# Patient Record
Sex: FEMALE | Race: WHITE | Employment: FULL TIME
[De-identification: names, ages, dates, MRNs, and addresses within clinical notes are randomized per-mention and may not be internally consistent; named-entity substitution may affect disease eponyms.]

---

## 2017-01-16 ENCOUNTER — TELEPHONE (OUTPATIENT)
Dept: FAMILY MEDICINE CLINIC | Facility: CLINIC | Age: 37
End: 2017-01-16

## 2017-01-16 RX ORDER — VENLAFAXINE HYDROCHLORIDE 75 MG/1
75 CAPSULE, EXTENDED RELEASE ORAL DAILY
Qty: 30 CAPSULE | Refills: 3 | Status: SHIPPED | OUTPATIENT
Start: 2017-01-16 | End: 2018-01-17 | Stop reason: ALTCHOICE

## 2017-02-24 RX ORDER — BUTALBITAL, ACETAMINOPHEN AND CAFFEINE 50; 325; 40 MG/1; MG/1; MG/1
1 TABLET ORAL EVERY 6 HOURS PRN
Qty: 60 TABLET | Refills: 0 | Status: SHIPPED | OUTPATIENT
Start: 2017-02-24 | End: 2018-01-17 | Stop reason: ALTCHOICE

## 2017-05-02 RX ORDER — TOPIRAMATE 50 MG/1
50 TABLET, FILM COATED ORAL 2 TIMES DAILY
Qty: 180 TABLET | Refills: 1 | Status: SHIPPED | OUTPATIENT
Start: 2017-05-02 | End: 2017-08-15 | Stop reason: SDUPTHER

## 2017-08-01 PROBLEM — E78.5 HYPERLIPIDEMIA: Status: ACTIVE | Noted: 2017-08-01

## 2017-08-14 RX ORDER — DICYCLOMINE HYDROCHLORIDE 10 MG/1
10 CAPSULE ORAL 4 TIMES DAILY
Qty: 360 CAPSULE | Refills: 0 | Status: SHIPPED | OUTPATIENT
Start: 2017-08-14 | End: 2018-01-17 | Stop reason: ALTCHOICE

## 2017-08-14 RX ORDER — OMEPRAZOLE 40 MG/1
40 CAPSULE, DELAYED RELEASE ORAL DAILY
Qty: 90 CAPSULE | Refills: 0 | Status: SHIPPED | OUTPATIENT
Start: 2017-08-14 | End: 2019-11-26 | Stop reason: ALTCHOICE

## 2017-08-16 RX ORDER — TOPIRAMATE 50 MG/1
50 TABLET, FILM COATED ORAL 2 TIMES DAILY
Qty: 180 TABLET | Refills: 0 | Status: SHIPPED | OUTPATIENT
Start: 2017-08-16 | End: 2019-08-22

## 2018-01-17 ENCOUNTER — OFFICE VISIT (OUTPATIENT)
Dept: FAMILY MEDICINE CLINIC | Age: 38
End: 2018-01-17
Payer: COMMERCIAL

## 2018-01-17 VITALS
DIASTOLIC BLOOD PRESSURE: 64 MMHG | RESPIRATION RATE: 18 BRPM | WEIGHT: 179.8 LBS | HEART RATE: 78 BPM | TEMPERATURE: 96.7 F | SYSTOLIC BLOOD PRESSURE: 126 MMHG | BODY MASS INDEX: 26.55 KG/M2

## 2018-01-17 DIAGNOSIS — F33.0 MILD EPISODE OF RECURRENT MAJOR DEPRESSIVE DISORDER (HCC): ICD-10-CM

## 2018-01-17 DIAGNOSIS — F41.9 ANXIETY: ICD-10-CM

## 2018-01-17 DIAGNOSIS — G43.009 MIGRAINE WITHOUT AURA AND WITHOUT STATUS MIGRAINOSUS, NOT INTRACTABLE: ICD-10-CM

## 2018-01-17 DIAGNOSIS — K21.9 GASTROESOPHAGEAL REFLUX DISEASE WITHOUT ESOPHAGITIS: Primary | Chronic | ICD-10-CM

## 2018-01-17 DIAGNOSIS — E78.00 PURE HYPERCHOLESTEROLEMIA: ICD-10-CM

## 2018-01-17 PROCEDURE — 99214 OFFICE O/P EST MOD 30 MIN: CPT | Performed by: NURSE PRACTITIONER

## 2018-01-17 RX ORDER — SUMATRIPTAN 50 MG/1
50 TABLET, FILM COATED ORAL
COMMUNITY

## 2018-01-17 RX ORDER — CYCLOBENZAPRINE HCL 10 MG
1 TABLET ORAL NIGHTLY PRN
Refills: 1 | COMMUNITY
Start: 2017-11-08 | End: 2019-01-17

## 2018-01-17 RX ORDER — AMITRIPTYLINE HYDROCHLORIDE 10 MG/1
10 TABLET, FILM COATED ORAL NIGHTLY
COMMUNITY
Start: 2018-01-09 | End: 2018-05-10 | Stop reason: SDUPTHER

## 2018-01-17 RX ORDER — SPIRONOLACTONE 50 MG/1
100 TABLET, FILM COATED ORAL DAILY
Refills: 5 | COMMUNITY
Start: 2017-12-20 | End: 2021-09-15

## 2018-01-17 RX ORDER — FLUTICASONE PROPIONATE 50 MCG
1 SPRAY, SUSPENSION (ML) NASAL
COMMUNITY
Start: 2017-11-21 | End: 2018-01-17 | Stop reason: SDUPTHER

## 2018-01-17 RX ORDER — FLUTICASONE PROPIONATE 50 MCG
1 SPRAY, SUSPENSION (ML) NASAL
Qty: 1 BOTTLE | Refills: 5 | Status: SHIPPED | OUTPATIENT
Start: 2018-01-17 | End: 2019-08-06 | Stop reason: SDUPTHER

## 2018-01-17 RX ORDER — FOLIC ACID 1 MG/1
1 TABLET ORAL DAILY
Qty: 90 TABLET | Refills: 1 | Status: SHIPPED | OUTPATIENT
Start: 2018-01-17 | End: 2018-09-10 | Stop reason: SDUPTHER

## 2018-01-17 ASSESSMENT — PATIENT HEALTH QUESTIONNAIRE - PHQ9
1. LITTLE INTEREST OR PLEASURE IN DOING THINGS: 1
SUM OF ALL RESPONSES TO PHQ9 QUESTIONS 1 & 2: 2
2. FEELING DOWN, DEPRESSED OR HOPELESS: 1
SUM OF ALL RESPONSES TO PHQ QUESTIONS 1-9: 2

## 2018-01-17 ASSESSMENT — ENCOUNTER SYMPTOMS
COUGH: 0
EYE PAIN: 0
EYE DISCHARGE: 0
SHORTNESS OF BREATH: 0
VOMITING: 0
DIARRHEA: 0
NAUSEA: 0
ABDOMINAL PAIN: 1
TROUBLE SWALLOWING: 0
WHEEZING: 0
SORE THROAT: 0
BACK PAIN: 0
CONSTIPATION: 0
RHINORRHEA: 0

## 2018-01-17 NOTE — PROGRESS NOTES
following healthy behaviors: nutrition, exercise and medication adherence  2. Reviewed prior labs and health maintenance  3. Continue current medications, diet and exercise. 4.  Discussed use, benefit, and side effects of prescribed medications. Barriers to medication compliance addressed. All patient questions answered. Pt voiced understanding. 5.  Patient given educational materials - see patient instructions  6. Was a self-tracking handout given in paper form or via [employer]? No  If yes, see orders or list here.     PHQ Scores 1/17/2018   PHQ2 Score 2   PHQ9 Score 2     Interpretation of Total Score Depression Severity: 1-4 = Minimal depression, 5-9 = Mild depression, 10-14 = Moderate depression, 15-19 = Moderately severe depression, 20-27 = Severe depression  depressed mood, treament plan discussed    Completed Refills   Requested Prescriptions      No prescriptions requested or ordered in this encounter

## 2018-01-19 ENCOUNTER — HOSPITAL ENCOUNTER (OUTPATIENT)
Age: 38
Setting detail: SPECIMEN
Discharge: HOME | End: 2018-01-19
Payer: COMMERCIAL

## 2018-01-19 DIAGNOSIS — G43.009 MIGRAINE WITHOUT AURA AND WITHOUT STATUS MIGRAINOSUS, NOT INTRACTABLE: ICD-10-CM

## 2018-01-19 DIAGNOSIS — E78.00 PURE HYPERCHOLESTEROLEMIA: ICD-10-CM

## 2018-01-19 DIAGNOSIS — K21.9 GASTROESOPHAGEAL REFLUX DISEASE WITHOUT ESOPHAGITIS: Chronic | ICD-10-CM

## 2018-01-19 LAB
ALBUMIN SERPL-MCNC: 4.2 G/DL (ref 3.5–5.2)
ALBUMIN/GLOBULIN RATIO: 1.6 (ref 1–2.5)
ALP BLD-CCNC: 80 U/L (ref 35–104)
ALT SERPL-CCNC: 14 U/L (ref 5–33)
ANION GAP SERPL CALCULATED.3IONS-SCNC: 15 MMOL/L (ref 9–17)
AST SERPL-CCNC: 15 U/L
BILIRUB SERPL-MCNC: 0.26 MG/DL (ref 0.3–1.2)
BUN BLDV-MCNC: 14 MG/DL (ref 6–20)
BUN/CREAT BLD: ABNORMAL (ref 9–20)
CALCIUM SERPL-MCNC: 8.8 MG/DL (ref 8.6–10.4)
CHLORIDE BLD-SCNC: 103 MMOL/L (ref 98–107)
CHOLESTEROL/HDL RATIO: 2.5
CHOLESTEROL: 151 MG/DL
CO2: 23 MMOL/L (ref 20–31)
CREAT SERPL-MCNC: 0.59 MG/DL (ref 0.5–0.9)
GFR AFRICAN AMERICAN: >60 ML/MIN
GFR NON-AFRICAN AMERICAN: >60 ML/MIN
GFR SERPL CREATININE-BSD FRML MDRD: ABNORMAL ML/MIN/{1.73_M2}
GFR SERPL CREATININE-BSD FRML MDRD: ABNORMAL ML/MIN/{1.73_M2}
GLUCOSE BLD-MCNC: 67 MG/DL (ref 70–99)
HCT VFR BLD CALC: 41.1 % (ref 36.3–47.1)
HDLC SERPL-MCNC: 60 MG/DL
HEMOGLOBIN: 12.4 G/DL (ref 11.9–15.1)
LDL CHOLESTEROL: 75 MG/DL (ref 0–130)
MCH RBC QN AUTO: 28.4 PG (ref 25.2–33.5)
MCHC RBC AUTO-ENTMCNC: 30.2 G/DL (ref 28.4–34.8)
MCV RBC AUTO: 94.3 FL (ref 82.6–102.9)
NRBC AUTOMATED: 0 PER 100 WBC
PDW BLD-RTO: 13.6 % (ref 11.8–14.4)
PLATELET # BLD: 437 K/UL (ref 138–453)
PMV BLD AUTO: 9.8 FL (ref 8.1–13.5)
POTASSIUM SERPL-SCNC: 4.5 MMOL/L (ref 3.7–5.3)
RBC # BLD: 4.36 M/UL (ref 3.95–5.11)
SODIUM BLD-SCNC: 141 MMOL/L (ref 135–144)
TOTAL PROTEIN: 6.9 G/DL (ref 6.4–8.3)
TRIGL SERPL-MCNC: 81 MG/DL
VLDLC SERPL CALC-MCNC: NORMAL MG/DL (ref 1–30)
WBC # BLD: 9.3 K/UL (ref 3.5–11.3)

## 2018-02-07 NOTE — PROGRESS NOTES
Subjective:      Patient ID: Lenny Villa is a 40 y.o. female. HPI G 0   P 0  Presents to office to establish care , HAs Mirena in since 5/15 , Last pap 10/17  C/o of itching and burning for over a year, she has been using Ten=movate for 2 months at 2 week  Intervals,  She feels like she has yeast now will collect cultures   Allergy: Advil sinus, Ibuprofen, Pollen extract  Med Hx: Sleep apnea, GERD< Depression, Constipation   Surgery : Upper GI , Colonoscopy, Sinus sx,   LAst pap 9/15/17 + HPV 16   Colposcope neg dysplasia 11/8/17   Menarche, 12  1st coitus 20   Partners  20   Non smoker     Review of Systems   Constitutional: Negative for chills, fatigue and fever. HENT: Negative for sinus pressure, sneezing, sore throat, tinnitus, trouble swallowing and voice change. Eyes: Negative for photophobia and visual disturbance. Respiratory: Negative for cough, shortness of breath, wheezing and stridor. Cardiovascular: Negative for chest pain, palpitations and leg swelling. Gastrointestinal: Negative for abdominal distention, abdominal pain, constipation, diarrhea, nausea and vomiting. Genitourinary: Positive for vaginal discharge and vaginal pain. Negative for decreased urine volume, difficulty urinating, dyspareunia, dysuria, flank pain, frequency, menstrual problem, pelvic pain and vaginal bleeding. Musculoskeletal: Negative for arthralgias, back pain, gait problem, joint swelling and myalgias. Skin: Negative for color change, pallor and rash. Neurological: Negative for tremors, seizures, syncope, speech difficulty, weakness, light-headedness and headaches. Hematological: Negative for adenopathy. Does not bruise/bleed easily. Psychiatric/Behavioral: Negative for agitation, behavioral problems, confusion, decreased concentration and dysphoric mood. The patient is not nervous/anxious and is not hyperactive.         Objective:   Physical Exam   Constitutional: She is oriented to person, place, and time. She appears well-developed and well-nourished. Abdominal: Soft. Bowel sounds are normal. She exhibits no distension and no mass. There is no tenderness. There is no rebound and no guarding. Genitourinary: Vaginal discharge found. Genitourinary Comments: No vaginal lesions, IUD string visible  Mod amount white discharge  NO CMT    Musculoskeletal: Normal range of motion. She exhibits no edema or tenderness. Neurological: She is alert and oriented to person, place, and time. Skin: Skin is warm and dry. No rash noted. No erythema. Psychiatric: She has a normal mood and affect. Her behavior is normal. Thought content normal.       Assessment:    BRAD,   + HPV 16   LGSIL  cx bx,   ECC neg   Vulva bx  Dermatitis   ?  Lichen sclerosus  Re biopsy if temovate BID does not help         Plan:    collect cultures  Pap  & ecc with provider 4/18  Treatment pending results   RV 3 weeks       Face to face 30 minutes > 50% of time spent in counseling

## 2018-02-13 ENCOUNTER — HOSPITAL ENCOUNTER (OUTPATIENT)
Age: 38
Setting detail: SPECIMEN
Discharge: HOME | End: 2018-02-13
Payer: COMMERCIAL

## 2018-02-13 ENCOUNTER — OFFICE VISIT (OUTPATIENT)
Dept: OBGYN CLINIC | Age: 38
End: 2018-02-13
Payer: COMMERCIAL

## 2018-02-13 VITALS
DIASTOLIC BLOOD PRESSURE: 60 MMHG | WEIGHT: 180 LBS | HEIGHT: 69 IN | SYSTOLIC BLOOD PRESSURE: 98 MMHG | BODY MASS INDEX: 26.66 KG/M2

## 2018-02-13 DIAGNOSIS — L30.9 DERMATITIS: Primary | ICD-10-CM

## 2018-02-13 DIAGNOSIS — R87.619 ATYPICAL GLANDULAR CELLS OF UNDETERMINED SIGNIFICANCE (AGUS) ON CERVICAL PAP SMEAR: ICD-10-CM

## 2018-02-13 DIAGNOSIS — L30.9 DERMATITIS: ICD-10-CM

## 2018-02-13 DIAGNOSIS — R87.612 LOW GRADE SQUAMOUS INTRAEPITH LESION ON CYTOLOGIC SMEAR CERVIX (LGSIL): ICD-10-CM

## 2018-02-13 DIAGNOSIS — R87.810 CERVICAL HIGH RISK HUMAN PAPILLOMAVIRUS (HPV) DNA TEST POSITIVE: ICD-10-CM

## 2018-02-13 PROBLEM — R55 VASOVAGAL SYNCOPE: Status: ACTIVE | Noted: 2018-02-13

## 2018-02-13 PROBLEM — B97.7 HIGH RISK HPV INFECTION: Status: ACTIVE | Noted: 2017-09-19

## 2018-02-13 LAB
DIRECT EXAM: NORMAL
Lab: NORMAL
SPECIMEN DESCRIPTION: NORMAL
STATUS: NORMAL

## 2018-02-13 PROCEDURE — 99213 OFFICE O/P EST LOW 20 MIN: CPT | Performed by: NURSE PRACTITIONER

## 2018-02-13 RX ORDER — CLOBETASOL PROPIONATE 0.5 MG/G
CREAM TOPICAL
Qty: 30 G | Refills: 2 | Status: SHIPPED | OUTPATIENT
Start: 2018-02-13 | End: 2018-03-26 | Stop reason: SDUPTHER

## 2018-02-13 ASSESSMENT — ENCOUNTER SYMPTOMS
TROUBLE SWALLOWING: 0
STRIDOR: 0
BACK PAIN: 0
VOMITING: 0
COLOR CHANGE: 0
COUGH: 0
ABDOMINAL PAIN: 0
SINUS PRESSURE: 0
VOICE CHANGE: 0
CONSTIPATION: 0
SHORTNESS OF BREATH: 0
SORE THROAT: 0
ABDOMINAL DISTENTION: 0
DIARRHEA: 0
PHOTOPHOBIA: 0
WHEEZING: 0
NAUSEA: 0

## 2018-02-16 LAB
CULTURE: NORMAL
CULTURE: NORMAL
Lab: NORMAL
SPECIMEN DESCRIPTION: NORMAL
STATUS: NORMAL

## 2018-03-06 ENCOUNTER — OFFICE VISIT (OUTPATIENT)
Dept: OBGYN CLINIC | Age: 38
End: 2018-03-06
Payer: COMMERCIAL

## 2018-03-06 VITALS
HEIGHT: 69 IN | DIASTOLIC BLOOD PRESSURE: 70 MMHG | SYSTOLIC BLOOD PRESSURE: 104 MMHG | BODY MASS INDEX: 26.22 KG/M2 | WEIGHT: 177 LBS

## 2018-03-06 DIAGNOSIS — L90.0 LICHEN SCLEROSUS: Primary | ICD-10-CM

## 2018-03-06 DIAGNOSIS — R87.619 ATYPICAL GLANDULAR CELLS OF UNDETERMINED SIGNIFICANCE (AGUS) ON CERVICAL PAP SMEAR: ICD-10-CM

## 2018-03-06 PROCEDURE — 99213 OFFICE O/P EST LOW 20 MIN: CPT | Performed by: NURSE PRACTITIONER

## 2018-03-06 ASSESSMENT — ENCOUNTER SYMPTOMS
CONSTIPATION: 0
ABDOMINAL DISTENTION: 0
VOMITING: 0
COLOR CHANGE: 0
DIARRHEA: 0
ABDOMINAL PAIN: 0
BACK PAIN: 0
NAUSEA: 0

## 2018-03-26 RX ORDER — CLOBETASOL PROPIONATE 0.5 MG/G
CREAM TOPICAL
Qty: 30 G | Refills: 2 | Status: SHIPPED | OUTPATIENT
Start: 2018-03-26 | End: 2018-05-17 | Stop reason: SDUPTHER

## 2018-05-10 RX ORDER — AMITRIPTYLINE HYDROCHLORIDE 10 MG/1
10 TABLET, FILM COATED ORAL NIGHTLY
Qty: 30 TABLET | Refills: 5 | Status: SHIPPED | OUTPATIENT
Start: 2018-05-10 | End: 2018-10-05 | Stop reason: DRUGHIGH

## 2018-05-17 ENCOUNTER — HOSPITAL ENCOUNTER (OUTPATIENT)
Age: 38
Setting detail: SPECIMEN
Discharge: HOME | End: 2018-05-17
Payer: COMMERCIAL

## 2018-05-17 ENCOUNTER — PROCEDURE VISIT (OUTPATIENT)
Dept: OBGYN CLINIC | Age: 38
End: 2018-05-17
Payer: COMMERCIAL

## 2018-05-17 VITALS
WEIGHT: 175 LBS | HEIGHT: 69 IN | DIASTOLIC BLOOD PRESSURE: 62 MMHG | SYSTOLIC BLOOD PRESSURE: 102 MMHG | RESPIRATION RATE: 14 BRPM | BODY MASS INDEX: 25.92 KG/M2

## 2018-05-17 DIAGNOSIS — L90.0 LICHEN SCLEROSUS: ICD-10-CM

## 2018-05-17 DIAGNOSIS — R87.619 ATYPICAL GLANDULAR CELLS OF UNDETERMINED SIGNIFICANCE (AGUS) ON CERVICAL PAP SMEAR: ICD-10-CM

## 2018-05-17 DIAGNOSIS — B97.7 HIGH RISK HPV INFECTION: Primary | ICD-10-CM

## 2018-05-17 PROCEDURE — 56605 BIOPSY OF VULVA/PERINEUM: CPT | Performed by: OBSTETRICS & GYNECOLOGY

## 2018-05-17 RX ORDER — CLOBETASOL PROPIONATE 0.5 MG/G
CREAM TOPICAL EVERY OTHER DAY
Qty: 30 G | Refills: 0 | Status: SHIPPED | OUTPATIENT
Start: 2018-05-17 | End: 2019-02-07 | Stop reason: SDUPTHER

## 2018-05-18 LAB
HPV SAMPLE: ABNORMAL
HPV SOURCE: ABNORMAL
HPV, GENOTYPE 16: DETECTED
HPV, GENOTYPE 18: NOT DETECTED
HPV, HIGH RISK OTHER: NOT DETECTED
HPV, INTERPRETATION: ABNORMAL

## 2018-05-21 LAB
CYTOLOGY REPORT: NORMAL
SURGICAL PATHOLOGY REPORT: NORMAL

## 2018-05-22 ENCOUNTER — TELEPHONE (OUTPATIENT)
Dept: OBGYN CLINIC | Age: 38
End: 2018-05-22

## 2018-05-22 ENCOUNTER — PATIENT MESSAGE (OUTPATIENT)
Dept: OBGYN CLINIC | Age: 38
End: 2018-05-22

## 2018-05-22 RX ORDER — FLUCONAZOLE 150 MG/1
150 TABLET ORAL ONCE
Qty: 1 TABLET | Refills: 0 | Status: SHIPPED | OUTPATIENT
Start: 2018-05-22 | End: 2018-05-22

## 2018-06-05 ENCOUNTER — PROCEDURE VISIT (OUTPATIENT)
Dept: OBGYN CLINIC | Age: 38
End: 2018-06-05
Payer: COMMERCIAL

## 2018-06-05 ENCOUNTER — HOSPITAL ENCOUNTER (OUTPATIENT)
Age: 38
Setting detail: SPECIMEN
Discharge: HOME | End: 2018-06-05
Payer: COMMERCIAL

## 2018-06-05 VITALS — DIASTOLIC BLOOD PRESSURE: 60 MMHG | SYSTOLIC BLOOD PRESSURE: 118 MMHG | HEIGHT: 69 IN

## 2018-06-05 DIAGNOSIS — R87.619 ATYPICAL GLANDULAR CELLS OF UNDETERMINED SIGNIFICANCE (AGUS) ON CERVICAL PAP SMEAR: Primary | ICD-10-CM

## 2018-06-05 DIAGNOSIS — B97.7 HIGH RISK HPV INFECTION: ICD-10-CM

## 2018-06-05 DIAGNOSIS — Z97.5 IUD (INTRAUTERINE DEVICE) IN PLACE: ICD-10-CM

## 2018-06-05 DIAGNOSIS — N90.0 VULVAR INTRAEPITHELIAL NEOPLASIA (VIN) GRADE 1: ICD-10-CM

## 2018-06-05 PROCEDURE — 56821 COLPOSCOPY VULVA W/BIOPSY: CPT | Performed by: OBSTETRICS & GYNECOLOGY

## 2018-06-05 PROCEDURE — 57454 BX/CURETT OF CERVIX W/SCOPE: CPT | Performed by: OBSTETRICS & GYNECOLOGY

## 2018-06-07 DIAGNOSIS — B37.9 YEAST INFECTION: Primary | ICD-10-CM

## 2018-06-07 RX ORDER — FLUCONAZOLE 150 MG/1
150 TABLET ORAL ONCE
Qty: 2 TABLET | Refills: 0 | Status: SHIPPED | OUTPATIENT
Start: 2018-06-07 | End: 2018-06-07

## 2018-06-08 LAB — SURGICAL PATHOLOGY REPORT: NORMAL

## 2018-06-10 PROBLEM — N90.0 VULVAR INTRAEPITHELIAL NEOPLASIA (VIN) GRADE 1: Status: ACTIVE | Noted: 2018-06-10

## 2018-06-10 PROBLEM — N87.1 DYSPLASIA OF CERVIX, HIGH GRADE CIN 2: Status: ACTIVE | Noted: 2018-06-10

## 2018-06-13 ENCOUNTER — TELEPHONE (OUTPATIENT)
Dept: OBGYN CLINIC | Age: 38
End: 2018-06-13

## 2018-06-14 ENCOUNTER — TELEPHONE (OUTPATIENT)
Dept: OBGYN CLINIC | Age: 38
End: 2018-06-14

## 2018-06-14 RX ORDER — MEDROXYPROGESTERONE ACETATE 150 MG/ML
150 INJECTION, SUSPENSION INTRAMUSCULAR ONCE
Qty: 1 ML | Refills: 0 | Status: ON HOLD | OUTPATIENT
Start: 2018-06-14 | End: 2018-07-05 | Stop reason: HOSPADM

## 2018-06-21 ENCOUNTER — HOSPITAL ENCOUNTER (OUTPATIENT)
Dept: PREADMISSION TESTING | Age: 38
Discharge: HOME | End: 2018-06-25
Payer: COMMERCIAL

## 2018-06-21 VITALS
TEMPERATURE: 96.4 F | RESPIRATION RATE: 16 BRPM | OXYGEN SATURATION: 100 % | HEIGHT: 69 IN | WEIGHT: 175 LBS | BODY MASS INDEX: 25.92 KG/M2 | SYSTOLIC BLOOD PRESSURE: 106 MMHG | DIASTOLIC BLOOD PRESSURE: 72 MMHG | HEART RATE: 67 BPM

## 2018-06-21 LAB
-: ABNORMAL
ABSOLUTE EOS #: 0.5 K/UL (ref 0–0.4)
ABSOLUTE IMMATURE GRANULOCYTE: ABNORMAL K/UL (ref 0–0.3)
ABSOLUTE LYMPH #: 2.3 K/UL (ref 1–4.8)
ABSOLUTE MONO #: 0.6 K/UL (ref 0.1–1.3)
AMORPHOUS: ABNORMAL
ANION GAP SERPL CALCULATED.3IONS-SCNC: 13 MMOL/L (ref 9–17)
BACTERIA: ABNORMAL
BASOPHILS # BLD: 1 % (ref 0–2)
BASOPHILS ABSOLUTE: 0.1 K/UL (ref 0–0.2)
BILIRUBIN URINE: NEGATIVE
BUN BLDV-MCNC: 12 MG/DL (ref 6–20)
BUN/CREAT BLD: ABNORMAL (ref 9–20)
CALCIUM SERPL-MCNC: 9.3 MG/DL (ref 8.6–10.4)
CASTS UA: ABNORMAL /LPF
CHLORIDE BLD-SCNC: 108 MMOL/L (ref 98–107)
CO2: 22 MMOL/L (ref 20–31)
COLOR: YELLOW
COMMENT UA: ABNORMAL
CREAT SERPL-MCNC: 0.56 MG/DL (ref 0.5–0.9)
CRYSTALS, UA: ABNORMAL /HPF
DIFFERENTIAL TYPE: ABNORMAL
EKG ATRIAL RATE: 56 BPM
EKG P AXIS: 61 DEGREES
EKG P-R INTERVAL: 138 MS
EKG Q-T INTERVAL: 404 MS
EKG QRS DURATION: 74 MS
EKG QTC CALCULATION (BAZETT): 389 MS
EKG R AXIS: 67 DEGREES
EKG T AXIS: 52 DEGREES
EKG VENTRICULAR RATE: 56 BPM
EOSINOPHILS RELATIVE PERCENT: 6 % (ref 0–4)
EPITHELIAL CELLS UA: ABNORMAL /HPF
GFR AFRICAN AMERICAN: >60 ML/MIN
GFR NON-AFRICAN AMERICAN: >60 ML/MIN
GFR SERPL CREATININE-BSD FRML MDRD: ABNORMAL ML/MIN/{1.73_M2}
GFR SERPL CREATININE-BSD FRML MDRD: ABNORMAL ML/MIN/{1.73_M2}
GLUCOSE BLD-MCNC: 97 MG/DL (ref 70–99)
GLUCOSE URINE: NEGATIVE
HCT VFR BLD CALC: 40.2 % (ref 36–46)
HEMOGLOBIN: 12.9 G/DL (ref 12–16)
IMMATURE GRANULOCYTES: ABNORMAL %
INR BLD: 1
KETONES, URINE: NEGATIVE
LEUKOCYTE ESTERASE, URINE: ABNORMAL
LYMPHOCYTES # BLD: 32 % (ref 24–44)
MCH RBC QN AUTO: 28.9 PG (ref 26–34)
MCHC RBC AUTO-ENTMCNC: 32.2 G/DL (ref 31–37)
MCV RBC AUTO: 89.8 FL (ref 80–100)
MONOCYTES # BLD: 8 % (ref 1–7)
MUCUS: ABNORMAL
NITRITE, URINE: NEGATIVE
NRBC AUTOMATED: ABNORMAL PER 100 WBC
OTHER OBSERVATIONS UA: ABNORMAL
PARTIAL THROMBOPLASTIN TIME: 27.1 SEC (ref 23–31)
PDW BLD-RTO: 14.6 % (ref 11.5–14.9)
PH UA: 6.5 (ref 5–8)
PLATELET # BLD: 390 K/UL (ref 150–450)
PLATELET ESTIMATE: ABNORMAL
PMV BLD AUTO: 7.5 FL (ref 6–12)
POTASSIUM SERPL-SCNC: 3.9 MMOL/L (ref 3.7–5.3)
PROTEIN UA: NEGATIVE
PROTHROMBIN TIME: 10.7 SEC (ref 9.7–12)
RBC # BLD: 4.47 M/UL (ref 4–5.2)
RBC # BLD: ABNORMAL 10*6/UL
RBC UA: ABNORMAL /HPF
RENAL EPITHELIAL, UA: ABNORMAL /HPF
SEG NEUTROPHILS: 53 % (ref 36–66)
SEGMENTED NEUTROPHILS ABSOLUTE COUNT: 3.9 K/UL (ref 1.3–9.1)
SODIUM BLD-SCNC: 143 MMOL/L (ref 135–144)
SPECIFIC GRAVITY UA: 1.01 (ref 1–1.03)
TRICHOMONAS: ABNORMAL
TURBIDITY: CLEAR
URINE HGB: ABNORMAL
UROBILINOGEN, URINE: NORMAL
WBC # BLD: 7.3 K/UL (ref 3.5–11)
WBC # BLD: ABNORMAL 10*3/UL
WBC UA: ABNORMAL /HPF
YEAST: ABNORMAL

## 2018-06-21 PROCEDURE — 93005 ELECTROCARDIOGRAM TRACING: CPT

## 2018-06-21 PROCEDURE — 85730 THROMBOPLASTIN TIME PARTIAL: CPT

## 2018-06-21 PROCEDURE — 80048 BASIC METABOLIC PNL TOTAL CA: CPT

## 2018-06-21 PROCEDURE — 36415 COLL VENOUS BLD VENIPUNCTURE: CPT

## 2018-06-21 PROCEDURE — 85025 COMPLETE CBC W/AUTO DIFF WBC: CPT

## 2018-06-21 PROCEDURE — 85610 PROTHROMBIN TIME: CPT

## 2018-06-21 PROCEDURE — 87086 URINE CULTURE/COLONY COUNT: CPT

## 2018-06-21 PROCEDURE — 81001 URINALYSIS AUTO W/SCOPE: CPT

## 2018-06-22 LAB
CULTURE: NORMAL
Lab: NORMAL
SPECIMEN DESCRIPTION: NORMAL
STATUS: NORMAL

## 2018-07-03 ENCOUNTER — NURSE ONLY (OUTPATIENT)
Dept: OBGYN CLINIC | Age: 38
End: 2018-07-03
Payer: COMMERCIAL

## 2018-07-03 DIAGNOSIS — Z01.812 PRE-PROCEDURE LAB EXAM: ICD-10-CM

## 2018-07-03 DIAGNOSIS — Z30.013 INITIATION OF DEPO PROVERA: Primary | ICD-10-CM

## 2018-07-03 LAB
CONTROL: NORMAL
PREGNANCY TEST URINE, POC: NEGATIVE

## 2018-07-03 PROCEDURE — 96372 THER/PROPH/DIAG INJ SC/IM: CPT | Performed by: NURSE PRACTITIONER

## 2018-07-03 PROCEDURE — 81025 URINE PREGNANCY TEST: CPT | Performed by: NURSE PRACTITIONER

## 2018-07-03 RX ORDER — MEDROXYPROGESTERONE ACETATE 150 MG/ML
150 INJECTION, SUSPENSION INTRAMUSCULAR ONCE
Status: COMPLETED | OUTPATIENT
Start: 2018-07-03 | End: 2018-07-03

## 2018-07-03 RX ADMIN — MEDROXYPROGESTERONE ACETATE 150 MG: 150 INJECTION, SUSPENSION INTRAMUSCULAR at 15:38

## 2018-07-05 ENCOUNTER — ANESTHESIA (OUTPATIENT)
Dept: OPERATING ROOM | Age: 38
End: 2018-07-05
Payer: COMMERCIAL

## 2018-07-05 ENCOUNTER — ANESTHESIA EVENT (OUTPATIENT)
Dept: OPERATING ROOM | Age: 38
End: 2018-07-05
Payer: COMMERCIAL

## 2018-07-05 ENCOUNTER — HOSPITAL ENCOUNTER (OUTPATIENT)
Age: 38
Setting detail: OUTPATIENT SURGERY
Discharge: HOME | End: 2018-07-05
Attending: OBSTETRICS & GYNECOLOGY | Admitting: OBSTETRICS & GYNECOLOGY
Payer: COMMERCIAL

## 2018-07-05 VITALS
OXYGEN SATURATION: 96 % | DIASTOLIC BLOOD PRESSURE: 50 MMHG | TEMPERATURE: 95.9 F | SYSTOLIC BLOOD PRESSURE: 91 MMHG | RESPIRATION RATE: 17 BRPM

## 2018-07-05 VITALS
HEART RATE: 65 BPM | OXYGEN SATURATION: 100 % | RESPIRATION RATE: 13 BRPM | SYSTOLIC BLOOD PRESSURE: 99 MMHG | WEIGHT: 175 LBS | DIASTOLIC BLOOD PRESSURE: 64 MMHG | TEMPERATURE: 97.9 F | BODY MASS INDEX: 25.92 KG/M2 | HEIGHT: 69 IN

## 2018-07-05 DIAGNOSIS — Z98.890 S/P LEEP: Primary | ICD-10-CM

## 2018-07-05 PROBLEM — Z97.5 IUD (INTRAUTERINE DEVICE) IN PLACE: Status: RESOLVED | Noted: 2018-06-05 | Resolved: 2018-07-05

## 2018-07-05 LAB
-: NORMAL
HCG, PREGNANCY URINE (POC): NEGATIVE

## 2018-07-05 PROCEDURE — 88342 IMHCHEM/IMCYTCHM 1ST ANTB: CPT

## 2018-07-05 PROCEDURE — 3600000002 HC SURGERY LEVEL 2 BASE: Performed by: OBSTETRICS & GYNECOLOGY

## 2018-07-05 PROCEDURE — 6360000002 HC RX W HCPCS

## 2018-07-05 PROCEDURE — 2500000003 HC RX 250 WO HCPCS: Performed by: OBSTETRICS & GYNECOLOGY

## 2018-07-05 PROCEDURE — 7100000030 HC ASPR PHASE II RECOVERY - FIRST 15 MIN: Performed by: OBSTETRICS & GYNECOLOGY

## 2018-07-05 PROCEDURE — 3600000012 HC SURGERY LEVEL 2 ADDTL 15MIN: Performed by: OBSTETRICS & GYNECOLOGY

## 2018-07-05 PROCEDURE — 7100000031 HC ASPR PHASE II RECOVERY - ADDTL 15 MIN: Performed by: OBSTETRICS & GYNECOLOGY

## 2018-07-05 PROCEDURE — 7100000000 HC PACU RECOVERY - FIRST 15 MIN: Performed by: OBSTETRICS & GYNECOLOGY

## 2018-07-05 PROCEDURE — 88307 TISSUE EXAM BY PATHOLOGIST: CPT

## 2018-07-05 PROCEDURE — 7100000001 HC PACU RECOVERY - ADDTL 15 MIN: Performed by: OBSTETRICS & GYNECOLOGY

## 2018-07-05 PROCEDURE — 57522 CONIZATION OF CERVIX: CPT | Performed by: OBSTETRICS & GYNECOLOGY

## 2018-07-05 PROCEDURE — 6360000002 HC RX W HCPCS: Performed by: ANESTHESIOLOGY

## 2018-07-05 PROCEDURE — 88300 SURGICAL PATH GROSS: CPT

## 2018-07-05 PROCEDURE — 84703 CHORIONIC GONADOTROPIN ASSAY: CPT

## 2018-07-05 PROCEDURE — 3700000001 HC ADD 15 MINUTES (ANESTHESIA): Performed by: OBSTETRICS & GYNECOLOGY

## 2018-07-05 PROCEDURE — 3700000000 HC ANESTHESIA ATTENDED CARE: Performed by: OBSTETRICS & GYNECOLOGY

## 2018-07-05 PROCEDURE — 6370000000 HC RX 637 (ALT 250 FOR IP): Performed by: OBSTETRICS & GYNECOLOGY

## 2018-07-05 PROCEDURE — 2580000003 HC RX 258: Performed by: OBSTETRICS & GYNECOLOGY

## 2018-07-05 PROCEDURE — 6370000000 HC RX 637 (ALT 250 FOR IP): Performed by: ANESTHESIOLOGY

## 2018-07-05 RX ORDER — PROPOFOL 10 MG/ML
INJECTION, EMULSION INTRAVENOUS PRN
Status: DISCONTINUED | OUTPATIENT
Start: 2018-07-05 | End: 2018-07-05 | Stop reason: SDUPTHER

## 2018-07-05 RX ORDER — OXYCODONE HYDROCHLORIDE AND ACETAMINOPHEN 5; 325 MG/1; MG/1
1 TABLET ORAL PRN
Status: DISCONTINUED | OUTPATIENT
Start: 2018-07-05 | End: 2018-07-05 | Stop reason: HOSPADM

## 2018-07-05 RX ORDER — IODINE SOLUTION STRONG 5% (LUGOL'S) 5 %
SOLUTION ORAL PRN
Status: DISCONTINUED | OUTPATIENT
Start: 2018-07-05 | End: 2018-07-05 | Stop reason: HOSPADM

## 2018-07-05 RX ORDER — ONDANSETRON 4 MG/1
4 TABLET, ORALLY DISINTEGRATING ORAL EVERY 8 HOURS PRN
Qty: 5 TABLET | Refills: 0 | Status: SHIPPED | OUTPATIENT
Start: 2018-07-05 | End: 2022-03-24

## 2018-07-05 RX ORDER — ACETIC ACID 5 %
LIQUID (ML) MISCELLANEOUS PRN
Status: DISCONTINUED | OUTPATIENT
Start: 2018-07-05 | End: 2018-07-05 | Stop reason: HOSPADM

## 2018-07-05 RX ORDER — SCOLOPAMINE TRANSDERMAL SYSTEM 1 MG/1
1 PATCH, EXTENDED RELEASE TRANSDERMAL
Status: DISCONTINUED | OUTPATIENT
Start: 2018-07-05 | End: 2018-07-05 | Stop reason: HOSPADM

## 2018-07-05 RX ORDER — HYDROCODONE BITARTRATE AND ACETAMINOPHEN 5; 325 MG/1; MG/1
1 TABLET ORAL EVERY 6 HOURS PRN
Qty: 10 TABLET | Refills: 0 | Status: SHIPPED | OUTPATIENT
Start: 2018-07-05 | End: 2018-07-08

## 2018-07-05 RX ORDER — MIDAZOLAM HYDROCHLORIDE 1 MG/ML
INJECTION INTRAMUSCULAR; INTRAVENOUS PRN
Status: DISCONTINUED | OUTPATIENT
Start: 2018-07-05 | End: 2018-07-05 | Stop reason: SDUPTHER

## 2018-07-05 RX ORDER — ONDANSETRON 2 MG/ML
4 INJECTION INTRAMUSCULAR; INTRAVENOUS ONCE
Status: COMPLETED | OUTPATIENT
Start: 2018-07-05 | End: 2018-07-05

## 2018-07-05 RX ORDER — SODIUM CHLORIDE, SODIUM LACTATE, POTASSIUM CHLORIDE, CALCIUM CHLORIDE 600; 310; 30; 20 MG/100ML; MG/100ML; MG/100ML; MG/100ML
INJECTION, SOLUTION INTRAVENOUS CONTINUOUS
Status: DISCONTINUED | OUTPATIENT
Start: 2018-07-05 | End: 2018-07-05 | Stop reason: HOSPADM

## 2018-07-05 RX ORDER — DEXAMETHASONE SODIUM PHOSPHATE 4 MG/ML
INJECTION, SOLUTION INTRA-ARTICULAR; INTRALESIONAL; INTRAMUSCULAR; INTRAVENOUS; SOFT TISSUE PRN
Status: DISCONTINUED | OUTPATIENT
Start: 2018-07-05 | End: 2018-07-05 | Stop reason: SDUPTHER

## 2018-07-05 RX ORDER — PROMETHAZINE HYDROCHLORIDE 25 MG/ML
6.25 INJECTION, SOLUTION INTRAMUSCULAR; INTRAVENOUS
Status: DISCONTINUED | OUTPATIENT
Start: 2018-07-05 | End: 2018-07-05 | Stop reason: HOSPADM

## 2018-07-05 RX ORDER — OXYCODONE HYDROCHLORIDE AND ACETAMINOPHEN 5; 325 MG/1; MG/1
2 TABLET ORAL PRN
Status: DISCONTINUED | OUTPATIENT
Start: 2018-07-05 | End: 2018-07-05 | Stop reason: HOSPADM

## 2018-07-05 RX ORDER — DIPHENHYDRAMINE HYDROCHLORIDE 50 MG/ML
12.5 INJECTION INTRAMUSCULAR; INTRAVENOUS
Status: DISCONTINUED | OUTPATIENT
Start: 2018-07-05 | End: 2018-07-05 | Stop reason: HOSPADM

## 2018-07-05 RX ORDER — LIDOCAINE HYDROCHLORIDE AND EPINEPHRINE 10; 10 MG/ML; UG/ML
INJECTION, SOLUTION INFILTRATION; PERINEURAL PRN
Status: DISCONTINUED | OUTPATIENT
Start: 2018-07-05 | End: 2018-07-05 | Stop reason: HOSPADM

## 2018-07-05 RX ORDER — MEPERIDINE HYDROCHLORIDE 50 MG/ML
12.5 INJECTION INTRAMUSCULAR; INTRAVENOUS; SUBCUTANEOUS EVERY 5 MIN PRN
Status: DISCONTINUED | OUTPATIENT
Start: 2018-07-05 | End: 2018-07-05 | Stop reason: HOSPADM

## 2018-07-05 RX ORDER — FENTANYL CITRATE 50 UG/ML
INJECTION, SOLUTION INTRAMUSCULAR; INTRAVENOUS PRN
Status: DISCONTINUED | OUTPATIENT
Start: 2018-07-05 | End: 2018-07-05 | Stop reason: SDUPTHER

## 2018-07-05 RX ORDER — MORPHINE SULFATE 2 MG/ML
1 INJECTION, SOLUTION INTRAMUSCULAR; INTRAVENOUS EVERY 5 MIN PRN
Status: DISCONTINUED | OUTPATIENT
Start: 2018-07-05 | End: 2018-07-05 | Stop reason: HOSPADM

## 2018-07-05 RX ORDER — FENTANYL CITRATE 50 UG/ML
25 INJECTION, SOLUTION INTRAMUSCULAR; INTRAVENOUS EVERY 5 MIN PRN
Status: DISCONTINUED | OUTPATIENT
Start: 2018-07-05 | End: 2018-07-05 | Stop reason: HOSPADM

## 2018-07-05 RX ORDER — ONDANSETRON 2 MG/ML
INJECTION INTRAMUSCULAR; INTRAVENOUS PRN
Status: DISCONTINUED | OUTPATIENT
Start: 2018-07-05 | End: 2018-07-05 | Stop reason: SDUPTHER

## 2018-07-05 RX ADMIN — ONDANSETRON 4 MG: 2 INJECTION, SOLUTION INTRAMUSCULAR; INTRAVENOUS at 10:26

## 2018-07-05 RX ADMIN — ONDANSETRON 4 MG: 2 INJECTION INTRAMUSCULAR; INTRAVENOUS at 08:41

## 2018-07-05 RX ADMIN — SODIUM CHLORIDE, POTASSIUM CHLORIDE, SODIUM LACTATE AND CALCIUM CHLORIDE: 600; 310; 30; 20 INJECTION, SOLUTION INTRAVENOUS at 09:35

## 2018-07-05 RX ADMIN — SODIUM CHLORIDE, POTASSIUM CHLORIDE, SODIUM LACTATE AND CALCIUM CHLORIDE: 600; 310; 30; 20 INJECTION, SOLUTION INTRAVENOUS at 07:36

## 2018-07-05 RX ADMIN — PROPOFOL 200 MG: 10 INJECTION, EMULSION INTRAVENOUS at 08:36

## 2018-07-05 RX ADMIN — MIDAZOLAM 2 MG: 1 INJECTION INTRAMUSCULAR; INTRAVENOUS at 08:32

## 2018-07-05 RX ADMIN — FENTANYL CITRATE 100 MCG: 50 INJECTION, SOLUTION INTRAMUSCULAR; INTRAVENOUS at 08:36

## 2018-07-05 RX ADMIN — DEXAMETHASONE SODIUM PHOSPHATE 4 MG: 4 INJECTION, SOLUTION INTRAMUSCULAR; INTRAVENOUS at 08:41

## 2018-07-05 ASSESSMENT — PULMONARY FUNCTION TESTS
PIF_VALUE: 12
PIF_VALUE: 11
PIF_VALUE: 10
PIF_VALUE: 9
PIF_VALUE: 2
PIF_VALUE: 11
PIF_VALUE: 11
PIF_VALUE: 10
PIF_VALUE: 11
PIF_VALUE: 10
PIF_VALUE: 10
PIF_VALUE: 11
PIF_VALUE: 11
PIF_VALUE: 2
PIF_VALUE: 12
PIF_VALUE: 11
PIF_VALUE: 5
PIF_VALUE: 2
PIF_VALUE: 11
PIF_VALUE: 3
PIF_VALUE: 10
PIF_VALUE: 11
PIF_VALUE: 11
PIF_VALUE: 1
PIF_VALUE: 11
PIF_VALUE: 12
PIF_VALUE: 0
PIF_VALUE: 11
PIF_VALUE: 11
PIF_VALUE: 10
PIF_VALUE: 11
PIF_VALUE: 5
PIF_VALUE: 12

## 2018-07-05 ASSESSMENT — PAIN DESCRIPTION - PAIN TYPE: TYPE: SURGICAL PAIN

## 2018-07-05 ASSESSMENT — PAIN DESCRIPTION - DESCRIPTORS: DESCRIPTORS: CRAMPING

## 2018-07-05 ASSESSMENT — PAIN SCALES - GENERAL
PAINLEVEL_OUTOF10: 3
PAINLEVEL_OUTOF10: 3
PAINLEVEL_OUTOF10: 0
PAINLEVEL_OUTOF10: 3
PAINLEVEL_OUTOF10: 0

## 2018-07-05 ASSESSMENT — PAIN DESCRIPTION - LOCATION: LOCATION: ABDOMEN

## 2018-07-05 ASSESSMENT — PAIN - FUNCTIONAL ASSESSMENT: PAIN_FUNCTIONAL_ASSESSMENT: 0-10

## 2018-07-05 NOTE — ANESTHESIA POSTPROCEDURE EVALUATION
POST- ANESTHESIA EVALUATION       Pt Name: Allan Crowe  MRN: [id number]  YOB: 1980  Date of evaluation: 7/5/2018  Time:  11:34 AM      /73   Pulse 67   Temp 97.9 °F (36.6 °C) (Temporal)   Resp 13   Ht 5' 9\" (1.753 m)   Wt 175 lb (79.4 kg)   LMP  (LMP Unknown)   SpO2 100%   BMI 25.84 kg/m²      Consciousness Level  Awake  Cardiopulmonary Status  Stable  Pain Adequately Treated YES  Nausea / Vomiting  NO  Adequate Hydration  YES  Anesthesia Related Complications NONE      Electronically signed by Carrie Delgado MD on 7/5/2018 at 11:34 AM       Department of Anesthesiology  Postprocedure Note    Patient: Allan Crowe  MRN: [id number]  YOB: 1980  Date of evaluation: 7/5/2018  Time:  11:34 AM     Procedure Summary     Date:  07/05/18 Room / Location:  [address] [place] [address] [address] / [address] [place] [address] [address]    Anesthesia Start:  8055 Anesthesia Stop:  1626    Procedure:  LEEP (N/A Cervix) Diagnosis:  (HIGH GRADE LESION CAMERON II )    Surgeon:  Elaine Pollock DO Responsible Provider:  Carrie Delgado MD    Anesthesia Type:  general ASA Status:  2          Anesthesia Type: general    Ilda Phase I: Ilda Score: 9    Ilda Phase II:      Last vitals: Reviewed and per EMR flowsheets.        Anesthesia Post Evaluation

## 2018-07-05 NOTE — H&P
Granulocytes 06/21/2018 NOT REPORTED  0 % Final    Segs Absolute 06/21/2018 3.90  1.3 - 9.1 k/uL Final    Absolute Lymph # 06/21/2018 2.30  1.0 - 4.8 k/uL Final    Absolute Mono # 06/21/2018 0.60  0.1 - 1.3 k/uL Final    Absolute Eos # 06/21/2018 0.50* 0.0 - 0.4 k/uL Final    Basophils # 06/21/2018 0.10  0.0 - 0.2 k/uL Final    Absolute Immature Granulocyte 06/21/2018 NOT REPORTED  0.00 - 0.30 k/uL Final    WBC Morphology 06/21/2018 NOT REPORTED   Final    RBC Morphology 06/21/2018 NOT REPORTED   Final    Platelet Estimate 26/36/3868 NOT REPORTED   Final    Protime 06/21/2018 10.7  9.7 - 12.0 sec Final    INR 06/21/2018 1.0   Final    Comment:       Non-therapeutic Range:     INR = 0.9-1.2  Therapeutic Range:    Moderate Anticoagulant Intensity:     INR = 2.0-3.0   High Anticoagulant Intensity:     INR = 2.5-3.5            PTT 06/21/2018 27.1  23.0 - 31.0 sec Final    Comment:       IV Heparin Therapy Range:     64.3-87.8            Color, UA 06/21/2018 YELLOW  YEL Final    Turbidity UA 06/21/2018 CLEAR  CLEAR Final    Glucose, Ur 06/21/2018 NEGATIVE  NEG Final    Bilirubin Urine 06/21/2018 NEGATIVE  NEG Final    Ketones, Urine 06/21/2018 NEGATIVE  NEG Final    Specific Gravity, UA 06/21/2018 1.012  1.000 - 1.030 Final    Urine Hgb 06/21/2018 SMALL* NEG Final    pH, UA 06/21/2018 6.5  5.0 - 8.0 Final    Protein, UA 06/21/2018 NEGATIVE  NEG Final    Urobilinogen, Urine 06/21/2018 Normal  NORM Final    Nitrite, Urine 06/21/2018 NEGATIVE  NEG Final    Leukocyte Esterase, Urine 06/21/2018 TRACE* NEG Final    Urinalysis Comments 06/21/2018 NOT REPORTED   Final    Glucose 06/21/2018 97  70 - 99 mg/dL Final    BUN 06/21/2018 12  6 - 20 mg/dL Final    CREATININE 06/21/2018 0.56  0.50 - 0.90 mg/dL Final    Bun/Cre Ratio 06/21/2018 NOT REPORTED  9 - 20 Final    Calcium 06/21/2018 9.3  8.6 - 10.4 mg/dL Final    Sodium 06/21/2018 143  135 - 144 mmol/L Final    Potassium 06/21/2018 3.9  3.7 - 5.3 mmol/L Final    Chloride 06/21/2018 108* 98 - 107 mmol/L Final    CO2 06/21/2018 22  20 - 31 mmol/L Final    Anion Gap 06/21/2018 13  9 - 17 mmol/L Final    GFR Non- 06/21/2018 >60  >60 mL/min Final    GFR  06/21/2018 >60  >60 mL/min Final    GFR Comment 06/21/2018        Final    Comment: Average GFR for 30-36 years old:   80 mL/min/1.73sq m  Chronic Kidney Disease:   <60 mL/min/1.73sq m  Kidney failure:   <15 mL/min/1.73sq m              eGFR calculated using average adult body mass. Additional eGFR calculator   available at:        [email or web address]            GFR Staging 06/21/2018 NOT REPORTED   Final    Ventricular Rate 06/21/2018 56  BPM Final    Atrial Rate 06/21/2018 56  BPM Final    P-R Interval 06/21/2018 138  ms Final    QRS Duration 06/21/2018 74  ms Final    Q-T Interval 06/21/2018 404  ms Final    QTc Calculation (Bazett) 06/21/2018 389  ms Final    P Axis 06/21/2018 61  degrees Final    R Axis 06/21/2018 67  degrees Final    T Axis 06/21/2018 52  degrees Final    - 06/21/2018        Final    WBC, UA 06/21/2018 5 TO 10  /HPF Final    RBC, UA 06/21/2018 5 TO 10  /HPF Final    Casts UA 06/21/2018 NOT REPORTED  /LPF Final    Crystals UA 06/21/2018 NOT REPORTED  NONE /HPF Final    Epithelial Cells UA 06/21/2018 10 TO 20  /HPF Final    Renal Epithelial, Urine 06/21/2018 NOT REPORTED  0 /HPF Final    Bacteria, UA 06/21/2018 FEW* NONE Final    Mucus, UA 06/21/2018 NOT REPORTED  NONE Final    Trichomonas, UA 06/21/2018 NOT REPORTED  NONE Final    Amorphous, UA 06/21/2018 NOT REPORTED  NONE Final    Other Observations UA 06/21/2018 NOT REPORTED  NREQ Final    Yeast, UA 06/21/2018 NOT REPORTED  NONE Final    Specimen Description 06/21/2018 . CLEAN CATCH URINE   Final    Special Requests 06/21/2018 NOT REPORTED   Final    Culture 06/21/2018 NO SIGNIFICANT GROWTH   Final    Status 06/21/2018 FINAL 06/22/2018   Final Reported: 6/8/2018 08:29     -- Diagnosis --   1. ENDOCERVIX, CURETTING:       - SCANT ENDOCERVICAL GLAND CELLS AND SQUAMOUS MUCOSAL STRIPS.     - SQUAMOUS ATYPIA CONSISTENT WITH HIGH-GRADE SQUAMOUS         INTRAEPITHELIAL LESION (CAMERON-2, MODERATE DYSPLASIA). 2. CERVIX, BIOPSY (1:00):       - FOCAL SQUAMOUS ATYPIA SUGGESTIVE OF LOW GRADE FEMI. 3. CERVIX, BIOPSY (4:00):       - CHRONIC CERVICITIS WITH REACTIVE SQUAMOUS METAPLASIA. 4. CERVIX, BIOPSY (7:00):       - FOCAL SQUAMOUS ATYPIA CONSISTENT WITH LOW GRADE FEMI. 5. SQUAMOUS MUCOSA, BIOPSY (RIGHT LABIAL):       - LOW GRADE SQUAMOUS INTRAEPITHELIAL LESION (MILD DYSPLASIA). Daniel Lorenz M.D.   **Electronically Signed Out**         [facility]/6/8/2018       Clinical Information   Pre-op Diagnosis:  ATYPICAL GLANDULAR CELLS OF UNDETERMINED   SIGNIFICANCE, VULVAR INTRAEPITHELIAL NEOPLASIA (NELIDA) GRADE I   Operative Findings: [address]; 1:00; 4:00; 7:00; LABIAL BX     Source of Specimen   1: ECC   2: 1 O'CLOCK   3: 4 O'CLOCK   4: 7 O'CLOCK   5: R LABIAL BX     Gross Description   1.  \"AMALIA LOVE, ECC\" Mucinous fragments, 1.2 x 0.6 x 0.1   cm in aggregate.  Entirely 1cs. 2.  \"AMALIA LOVE, 1:00\" 0.5 x 0.3 x 0.1 cm tan-white   fragment.  There are additional mucinous fragments, 0.4 x 0.3 x 0.1 cm   in aggregate.  Entirely 1cs. 3.  \"AMALIA LOVE, 4:00\" 0.3 x 0.2 x 0.1 cm tan-white   fragment.  There are additional mucinous fragments, 1.0 x 0.8 x 0.1 cm   in aggregate.  Entirely 1cs. 4.  \"AMALIA LOVE, 7:00\" 0.3 x 0.3 x 0.1 cm tan-white   fragment.  Entirely 1cs. 5.  \"AMALIA LOVE, R LABIAL BX\" 0.2 x 0.2 x 0.1 cm tan-white   fragment.  Entirely 1cs.  rh tm       Microscopic Description   1-5.  Microscopic examination performed, atfour levels in part 3, six   levels in parts 2,4.  Parts 1 and 3 are further examined with   control-reactive p16 immunostain which shows diffuse strong staining   in isolated partial-thickness mucosal strips and part 1 and which is   negative in part 3; this staining pattern appears consistent with   high-grade FEMI (moderate dysplasia, CAMERON 2). DIAGNOSIS & PLAN:  1. Colposcopy- ECC with CAMERON II, 1 o'clock bx with LSIL, 7 o'clock bx with LSIL, R labial bx with LSIL   - Proceed with planned procedure: LEEP   - Consent signed, on chart. - The patient is ready for transport to the operative suite.       Russ Loving DO  Ob/Gyn Resident   [address] [facility],  [address]  7/5/2018, 7:26 AM

## 2018-07-05 NOTE — ANESTHESIA PRE PROCEDURE
HCG NEGATIVE 07/05/2018        ABGs: No results found for: PHART, PO2ART, PTK7EZM, RHY5KSH, BEART, Z3KGTCAN     Type & Screen (If Applicable):  No results found for: LABABO, [address]    Anesthesia Evaluation  Patient summary reviewed and Nursing notes reviewed no history of anesthetic complications:   Airway: Mallampati: II  TM distance: >3 FB   Neck ROM: full  Mouth opening: > = 3 FB Dental: normal exam         Pulmonary:normal exam  breath sounds clear to auscultation  (+) sleep apnea:                             Cardiovascular:    (+) hyperlipidemia        Rhythm: regular  Rate: normal                    Neuro/Psych:   (+) headaches: migraine headaches, psychiatric history:depression/anxiety             GI/Hepatic/Renal:   (+) GERD: no interval change,           Endo/Other:    (+) : arthritis: OA and no interval change. , . Abdominal:           Vascular: negative vascular ROS. Anesthesia Plan      general     ASA 2       Induction: intravenous. MIPS: Postoperative opioids intended and Prophylactic antiemetics administered. Anesthetic plan and risks discussed with patient. Plan discussed with CRNA.                   Haleigh Deleon MD   7/5/2018

## 2018-07-05 NOTE — BRIEF OP NOTE
Brief Operative Note  Department of Obstetrics and Gynecology  [facility]     Patient: Jimmy Andrew   : 1980  MRN: [id number]       Acct: [de-identified]   Date of Procedure: 18     Pre-operative Diagnosis: 40 y.o. female  Cervical dysplasia: ECC CAMERON II, 1 o'clock LSIL, 7 o'clock LSIL    Post-operative Diagnosis: Same    Procedure: LEEP with IUD removal    Surgeon: Dr. Adal Anthony     Assistant(s): Kaykay Gibson DO; Compa Pereira DO    Anesthesia: general    Findings:  Normal appearing vulva without lesions, normal appearing vaginal mucosa with physiologic discharge in vaginal vault, cervix with no aceto-white changes present and with mild mosaicism at 6 o'clock. Total IV fluids/Blood products:  900 ml crystalloid  Urine Output:  30 ml    Estimated blood loss:  5 ml  Drains:  none  Specimens:  Ectocervix tagged at 12 o'clock, Endocervix  Instrument and Sponge Count: Correct  Complications:  none  Condition:  good, transferred to post anesthesia recovery    See full operative report for further details.     Kaykay Gibson DO  Ob/Gyn Resident   2018, 9:15 AM

## 2018-07-10 LAB — SURGICAL PATHOLOGY REPORT: NORMAL

## 2018-07-12 ENCOUNTER — HOSPITAL ENCOUNTER (OUTPATIENT)
Age: 38
Setting detail: SPECIMEN
Discharge: HOME | End: 2018-07-12
Payer: COMMERCIAL

## 2018-07-12 ENCOUNTER — OFFICE VISIT (OUTPATIENT)
Dept: OBGYN CLINIC | Age: 38
End: 2018-07-12

## 2018-07-12 VITALS
WEIGHT: 173 LBS | SYSTOLIC BLOOD PRESSURE: 112 MMHG | BODY MASS INDEX: 25.62 KG/M2 | HEIGHT: 69 IN | RESPIRATION RATE: 14 BRPM | DIASTOLIC BLOOD PRESSURE: 62 MMHG

## 2018-07-12 DIAGNOSIS — L73.9 FOLLICULITIS: ICD-10-CM

## 2018-07-12 DIAGNOSIS — Z98.890 S/P LEEP: Primary | ICD-10-CM

## 2018-07-12 DIAGNOSIS — N89.8 VAGINAL LESION: ICD-10-CM

## 2018-07-12 PROCEDURE — 99024 POSTOP FOLLOW-UP VISIT: CPT | Performed by: OBSTETRICS & GYNECOLOGY

## 2018-07-12 RX ORDER — CLINDAMYCIN PHOSPHATE 10 MG/G
GEL TOPICAL
Qty: 1 TUBE | Refills: 1 | Status: SHIPPED | OUTPATIENT
Start: 2018-07-12 | End: 2018-07-19

## 2018-07-12 NOTE — PROGRESS NOTES
Yohana Parra Dontae  7/12/2018  5:25 PM      Yohana Parra Dontae  Procedure: LEEP      Matthew Angelo is a 40 y.o. female Ines Lauren      The patient was seen, she has small bump on right perineal area. She denied any shortness of breath, chest pain or dizziness. She denied any nausea, vomiting, or diarrhea. There is no fever, chills, or rigors. +vaginal bleeding +odor that is resolving. All of her pre-operative complaints are now resolved. Blood pressure 112/62, resp. rate 14, height 5' 9\" (1.753 m), weight 173 lb (78.5 kg), last menstrual period 07/09/2018, not currently breastfeeding. Abdominal Exam: soft non-tender. Good bowel sounds. No guarding, rebound or rigidity. No costal vertebral angle tenderness bilateral. No hernias    Incision: no incision    Extremities: No edema or calf pain noted bilaterally. Pelvic Exam: cervix healing with some old blood, no active bleeding from cervix. Recommend 1 more week of pelvic rest.      Results for orders placed or performed during the hospital encounter of 07/05/18   Surgical Pathology   Result Value Ref Range    Surgical Pathology Report       (NOTE)  [id number]  [address]  [phone]  Fax: [phone]  SURGICAL PATHOLOGY REPORT    Patient Name: Romina Hummel  MR#: [id number]  Specimen #[id number]      Final Diagnosis  SPECIMEN \"A\":  ECTOCERVIX, LEEP:        HIGH GRADE INTRAEPITHELIAL LESION (HSIL; CAMERON 2), OBSERVED INK  MARGINS CLEAR    SPECIMEN \"B\":  ENDOCERVIX, LEEP:        ENDOCERVICAL MUCOSA DEMONSTRATING CYTOLOGIC ATYPIA, POSSIBLY  DYSPLASTIC (SEE COMMENT)    SPECIMEN \"C\":  INTRAUTERINE CONTRACEPTIVE DEVICE (\"MIRENA IUD\"), GROSS  EXAMINATION ONLY    Diagnosis Comment  In part \"B\" optimal interpretation of the specimen is precluded by  significant thermal cautery artifact.   A focal area demonstrating  cellular crowding with nuclear enlargement and occasional mitotic  figures are noted. This area is suspicious for dysplasia, possibly  high grade, but cautery artifact significantly hinders optimal  interpretation. A p16 immunostain shows cytoplas bill staining within  this region. Strong block-like positivity, commonly seen in high  grade dysplasia is not identified. The margins are indeterminate. Patrice Valle D.O.  **Electronically Signed Out**         tc/7/10/2018    Clinical Information  High grade lesion CAMERON II; LEEP w/colposcopy; formalin time:  A) 09:02,  B) No formalin time on surgical slip         Source:  A: Ectocervix with stitch at 12 o'clock  B: Endocervix  C: Mirena IUD    Gross Description  Specimen \"A\":  The specimen received in formalin is labelled  \"ectocervix with stitch at 12 o'clock\". It consists of two partly   portions of tissue which upon reconstruction form a roughly  oblong portion of tissue measuring 2.5 x 1.5 cm and up to 0.6 cm in  thickness. One aspect is pink with a fine granular texture and is  partly surrounded by a rim of grayish-tan tissue measuring up to 0.4  cm in width. The opposite aspect is reddish-gray. The deeper margin  of the specimen is painted with blue in k. Starting from the area with  the suture which is taken at the 12 o'clock position, the specimen is  step-sectioned in the radial clockwise fashion. Sections from the 12  o'clock to the 3 o'clock, 3 'clock to 6 o'clock, 6 o'clock to the 9  o'clock, and 9 o'clock to the 12 o'clock positions are submitted  labelled #1 to #3 and #4 in that sequence, respectively. Specimen \"B\":  The specimen received in formalin is labelled  \"endocervix\". It consists of two unoriented strips of grayish-tan  tissue with probable cautery effects. They measure 0.5 x 0.4 and 1 x  0.3 cm with a thickness less than 0.1 cm. The specimen is submitted  in toto for microscopic examination.      Specimen \"C\":  The specimen received in formalin is labelled \"Mirena  IUD\". It is a T-shaped white plastic device attached to which is a  string. The trunk of this structure measures 3 cm in length and 0.3  cm in diameter. The horizontal component measures 3 cm x 0.2 cm x 0.1  cm. The attached string measures 8  cm in length. This component is  for gross examination only. Microscopic Description  Specimen \"A\":  Microscopic examination of twelve H&E slides confirms  the diagnosis. Specimen \"B\":  Microscopic examination of three H&E slides confirms  the diagnosis. The sections of endocervical type mucosa are noted. Within these sections marked thermal cautery artifact is present  precluding optimal interpretation. Within areas of endocervical  epithelium features suggestive mitotic figures are noted. To help  further clarify this process, a p16 immunostain is performed. It  demonstrates a cytoplasmic staining within this area of interest  (control adequate). POCT HCG, Prenancy, Ur   Result Value Ref Range    HCG, Pregnancy Urine (POC) NEGATIVE NEG    - NOT REPORTED            Assessment:      Diagnosis Orders   1. S/P LEEP 7/5/18 with IUD removal     2. Vaginal lesion  Herpes Simplex Virus Culture    clindamycin (CLEOCIN-T) 1 % gel   3. Folliculitis  clindamycin (CLEOCIN-T) 1 % gel        Patient Active Problem List    Diagnosis Date Noted    S/P LEEP 7/5/18 with IUD removal 07/05/2018    Dysplasia of cervix, high grade CAMERON 2 06/10/2018     Recommend LEEP      Vulvar intraepithelial neoplasia (NELIDA) grade 1 06/10/2018     Repeat colposcopy in 12 months.  Lichen sclerosus 99/61/1153    Vasovagal syncope 02/13/2018     Overview:   last episode 2016, negative cardiac workup, occurs with bowel movements      High risk HPV infection 09/19/2017    Hyperlipidemia 08/01/2017    Mild episode of recurrent major depressive disorder ([facility] [address] 11/28/2016    Anxiety 11/28/2016    Constipation 01/18/2016    Migraine without aura      Onset 2010.   MRI brain

## 2018-07-14 LAB
CULTURE: NORMAL
CULTURE: NORMAL
Lab: NORMAL
SPECIMEN DESCRIPTION: NORMAL
STATUS: NORMAL

## 2018-07-20 ENCOUNTER — TELEPHONE (OUTPATIENT)
Dept: OBGYN CLINIC | Age: 38
End: 2018-07-20

## 2018-07-20 DIAGNOSIS — N89.8 VAGINAL DISCHARGE: Primary | ICD-10-CM

## 2018-07-20 RX ORDER — METRONIDAZOLE 500 MG/1
500 TABLET ORAL 2 TIMES DAILY
Qty: 14 TABLET | Refills: 0 | Status: SHIPPED | OUTPATIENT
Start: 2018-07-20 | End: 2018-07-27

## 2018-07-20 RX ORDER — FLUCONAZOLE 150 MG/1
150 TABLET ORAL ONCE
Qty: 1 TABLET | Refills: 0 | Status: SHIPPED | OUTPATIENT
Start: 2018-07-20 | End: 2018-07-20

## 2018-07-26 ENCOUNTER — TELEPHONE (OUTPATIENT)
Dept: OBGYN CLINIC | Age: 38
End: 2018-07-26

## 2018-07-30 ENCOUNTER — HOSPITAL ENCOUNTER (OUTPATIENT)
Age: 38
Setting detail: SPECIMEN
Discharge: HOME | End: 2018-07-30
Payer: COMMERCIAL

## 2018-07-30 ENCOUNTER — OFFICE VISIT (OUTPATIENT)
Dept: OBGYN CLINIC | Age: 38
End: 2018-07-30

## 2018-07-30 VITALS
DIASTOLIC BLOOD PRESSURE: 62 MMHG | WEIGHT: 172.13 LBS | RESPIRATION RATE: 16 BRPM | BODY MASS INDEX: 25.5 KG/M2 | SYSTOLIC BLOOD PRESSURE: 114 MMHG | HEIGHT: 69 IN

## 2018-07-30 DIAGNOSIS — N89.8 VAGINAL DISCHARGE: Primary | ICD-10-CM

## 2018-07-30 DIAGNOSIS — N89.8 VAGINAL ITCHING: ICD-10-CM

## 2018-07-30 DIAGNOSIS — N89.8 VAGINAL IRRITATION: ICD-10-CM

## 2018-07-30 DIAGNOSIS — Z01.812 PRE-PROCEDURE LAB EXAM: ICD-10-CM

## 2018-07-30 PROCEDURE — 99024 POSTOP FOLLOW-UP VISIT: CPT | Performed by: OBSTETRICS & GYNECOLOGY

## 2018-07-30 RX ORDER — FLUCONAZOLE 150 MG/1
150 TABLET ORAL ONCE
Qty: 1 TABLET | Refills: 0 | Status: SHIPPED | OUTPATIENT
Start: 2018-07-30 | End: 2018-07-30

## 2018-07-31 LAB
DIRECT EXAM: NORMAL
Lab: NORMAL
SPECIMEN DESCRIPTION: NORMAL
STATUS: NORMAL

## 2018-08-01 LAB
C TRACH DNA GENITAL QL NAA+PROBE: NEGATIVE
N. GONORRHOEAE DNA: NEGATIVE

## 2018-08-02 ENCOUNTER — TELEPHONE (OUTPATIENT)
Dept: OBGYN CLINIC | Age: 38
End: 2018-08-02

## 2018-08-02 LAB
CULTURE: NORMAL
Lab: NORMAL
SPECIMEN DESCRIPTION: NORMAL
STATUS: NORMAL

## 2018-08-02 NOTE — TELEPHONE ENCOUNTER
----- Message from Stephen Dickson DO sent at 8/1/2018  1:13 PM EDT -----  Please call patient notify that pelvic cultures are negative. Thank you.

## 2018-08-22 ENCOUNTER — PROCEDURE VISIT (OUTPATIENT)
Dept: OBGYN CLINIC | Age: 38
End: 2018-08-22
Payer: COMMERCIAL

## 2018-08-22 VITALS
SYSTOLIC BLOOD PRESSURE: 122 MMHG | HEIGHT: 69 IN | BODY MASS INDEX: 25.03 KG/M2 | WEIGHT: 169 LBS | DIASTOLIC BLOOD PRESSURE: 60 MMHG

## 2018-08-22 DIAGNOSIS — Z01.812 PRE-PROCEDURE LAB EXAM: Primary | ICD-10-CM

## 2018-08-22 DIAGNOSIS — Z30.430 ENCOUNTER FOR IUD INSERTION: ICD-10-CM

## 2018-08-22 DIAGNOSIS — Z97.5 IUD (INTRAUTERINE DEVICE) IN PLACE: ICD-10-CM

## 2018-08-22 LAB
CONTROL: NORMAL
PREGNANCY TEST URINE, POC: NEGATIVE

## 2018-08-22 PROCEDURE — 58300 INSERT INTRAUTERINE DEVICE: CPT | Performed by: OBSTETRICS & GYNECOLOGY

## 2018-08-22 PROCEDURE — 81025 URINE PREGNANCY TEST: CPT | Performed by: OBSTETRICS & GYNECOLOGY

## 2018-08-22 NOTE — PROGRESS NOTES
2018    Toni Coronel is a 40 y.o. female,     No LMP recorded (lmp unknown). Patient has had an injection. Chief Complaint   Patient presents with    Procedure     IUD insertion     Urine pregnancy test: negative     Past Medical History:   Diagnosis Date    Acne     Allergic rhinitis     Depression     Esophageal reflux     Functional dyspepsia     Headache(784.0)     HPV (human papilloma virus) anogenital infection     Hyperlipidemia     Syncope     Unspecified constipation     Unspecified sleep apnea          Past Surgical History:   Procedure Laterality Date    BREAST SURGERY Left     cyst removed    COLONOSCOPY N/A 2014    RECTAL BIOPSY, normal mucosa    COLPOSCOPY  2017    INTRAUTERINE DEVICE INSERTION  2015    Mirena    RI COLPOSCOPY,CERVIX W/ADJ VAG,W/LOOP BX N/A 2018    LEEP performed by Stephen Dickson DO at [facility] ENDOSCOPY  12/10/2014    Small hiatal hernia. esophageal ring     WISDOM TOOTH EXTRACTION           Family History   Problem Relation Age of Onset    Parkinsonism Other     Diabetes Mother     Cancer Mother         Breast    Hypertension Mother     Other Mother         pacemaker    Arthritis Father     Heart Disease Maternal Grandmother     Diabetes Maternal Grandmother     Cancer Maternal Grandfather     Diabetes Paternal Grandmother     Heart Disease Paternal Grandmother     Diabetes Paternal Grandfather     Heart Disease Paternal Grandfather          Social History   Substance Use Topics    Smoking status: Never Smoker    Smokeless tobacco: Never Used    Alcohol use 0.0 oz/week      Comment: occasional         Current Outpatient Prescriptions   Medication Sig Dispense Refill    lidocaine (XYLOCAINE) 2 % jelly Apply topically as needed.  1 Tube 0    ondansetron (ZOFRAN-ODT) 4 MG disintegrating tablet Take 1 tablet by mouth every 8 hours as needed for Nausea or Vomiting 5

## 2018-08-24 ENCOUNTER — HOSPITAL ENCOUNTER (OUTPATIENT)
Dept: ULTRASOUND IMAGING | Age: 38
Discharge: HOME | End: 2018-08-26
Payer: COMMERCIAL

## 2018-08-24 DIAGNOSIS — Z97.5 IUD (INTRAUTERINE DEVICE) IN PLACE: ICD-10-CM

## 2018-08-24 PROCEDURE — 76830 TRANSVAGINAL US NON-OB: CPT

## 2018-08-24 PROCEDURE — 76856 US EXAM PELVIC COMPLETE: CPT

## 2018-09-10 DIAGNOSIS — G43.009 MIGRAINE WITHOUT AURA AND WITHOUT STATUS MIGRAINOSUS, NOT INTRACTABLE: Primary | ICD-10-CM

## 2018-09-10 RX ORDER — FOLIC ACID 1 MG/1
1 TABLET ORAL DAILY
Qty: 90 TABLET | Refills: 0 | Status: SHIPPED | OUTPATIENT
Start: 2018-09-10 | End: 2019-01-31 | Stop reason: SDUPTHER

## 2018-10-01 DIAGNOSIS — N89.8 VAGINAL DISCHARGE: ICD-10-CM

## 2018-10-01 DIAGNOSIS — N89.8 VAGINAL ITCHING: ICD-10-CM

## 2018-10-01 DIAGNOSIS — N89.8 VAGINAL IRRITATION: ICD-10-CM

## 2018-10-05 ENCOUNTER — OFFICE VISIT (OUTPATIENT)
Dept: FAMILY MEDICINE CLINIC | Age: 38
End: 2018-10-05
Payer: COMMERCIAL

## 2018-10-05 VITALS
SYSTOLIC BLOOD PRESSURE: 104 MMHG | DIASTOLIC BLOOD PRESSURE: 76 MMHG | HEART RATE: 76 BPM | WEIGHT: 170.2 LBS | RESPIRATION RATE: 18 BRPM | TEMPERATURE: 98 F | BODY MASS INDEX: 25.13 KG/M2

## 2018-10-05 DIAGNOSIS — J30.1 SEASONAL ALLERGIC RHINITIS DUE TO POLLEN: ICD-10-CM

## 2018-10-05 DIAGNOSIS — F33.0 MILD EPISODE OF RECURRENT MAJOR DEPRESSIVE DISORDER (HCC): Primary | ICD-10-CM

## 2018-10-05 DIAGNOSIS — F41.9 ANXIETY: ICD-10-CM

## 2018-10-05 PROCEDURE — 99214 OFFICE O/P EST MOD 30 MIN: CPT | Performed by: NURSE PRACTITIONER

## 2018-10-05 RX ORDER — AMITRIPTYLINE HYDROCHLORIDE 25 MG/1
25 TABLET, FILM COATED ORAL NIGHTLY
Qty: 30 TABLET | Refills: 3 | Status: SHIPPED | OUTPATIENT
Start: 2018-10-05 | End: 2019-01-05 | Stop reason: SDUPTHER

## 2018-10-05 ASSESSMENT — ENCOUNTER SYMPTOMS
COUGH: 0
DIARRHEA: 0
VOMITING: 0
CONSTIPATION: 0
EYE ITCHING: 1
ABDOMINAL PAIN: 1
EYE REDNESS: 0
SORE THROAT: 0
EYE DISCHARGE: 0
RHINORRHEA: 1

## 2018-10-05 NOTE — PROGRESS NOTES
MG tablet Take 1 tablet by mouth daily 90 tablet 3    spironolactone (ALDACTONE) 50 MG tablet Take 1 tablet by mouth daily  5    cyclobenzaprine (FLEXERIL) 10 MG tablet Take 1 tablet by mouth nightly as needed  1    SUMAtriptan (IMITREX) 50 MG tablet Take 50 mg by mouth once as needed for Migraine      fluticasone (FLONASE) 50 MCG/ACT nasal spray 1 spray by Nasal route every morning (before breakfast) 1 Bottle 5    sucralfate (CARAFATE) 1 GM tablet TAKE 1 TABLET 4 TIMES DAILY 120 tablet 1    omeprazole (PRILOSEC) 40 MG delayed release capsule Take 1 capsule by mouth daily Will need office appt for additional refills 90 capsule 0    fexofenadine (ALLEGRA) 180 MG tablet Take 180 mg by mouth daily.  topiramate (TOPAMAX) 50 MG tablet Take 1 tablet by mouth 2 times daily (Patient taking differently: Take 75 mg by mouth 2 times daily ) 180 tablet 0    EPINEPHrine (EPIPEN) 0.3 MG/0.3ML SOAJ injection Inject 0.3 mg into the muscle as needed       No current facility-administered medications for this visit. Patient ID: Heidi Khoury is a 40 y.o. female. Depression: Patient complains of depression. She complains of depressed mood, difficulty concentrating, fatigue, feelings of worthlessness/guilt and insomnia. Onset was approximately several years ago, gradually worsening since that time. She denies current suicidal and homicidal plan or intent. Family history significant for depression. Possible organic causes contributing are: none. Risk factors: work stress and recent death in family Previous treatment includes Effexor and individual therapy. She complains of the following side effects from the treatment: none. Patient presents in office today with concerns about worsening depression. Tells me she has been feeling down for last 6-12 months. She is currently taking Elavil 10 mg nightly. At first medication helped her sleep but now she isn't. Cannot concentrate during the day.  Feels tired

## 2018-12-27 ENCOUNTER — PATIENT MESSAGE (OUTPATIENT)
Dept: OBGYN CLINIC | Age: 38
End: 2018-12-27

## 2018-12-27 DIAGNOSIS — N89.8 VAGINAL DISCHARGE: ICD-10-CM

## 2018-12-27 DIAGNOSIS — N89.8 VAGINAL ITCHING: ICD-10-CM

## 2018-12-27 DIAGNOSIS — N89.8 VAGINAL IRRITATION: ICD-10-CM

## 2018-12-27 RX ORDER — METRONIDAZOLE 500 MG/1
500 TABLET ORAL 2 TIMES DAILY
Qty: 14 TABLET | Refills: 0 | Status: SHIPPED | OUTPATIENT
Start: 2018-12-27 | End: 2019-01-03

## 2018-12-27 RX ORDER — FLUCONAZOLE 150 MG/1
150 TABLET ORAL ONCE
Qty: 1 TABLET | Refills: 0 | Status: SHIPPED | OUTPATIENT
Start: 2018-12-27 | End: 2018-12-27

## 2019-01-21 ENCOUNTER — HOSPITAL ENCOUNTER (OUTPATIENT)
Age: 39
Setting detail: SPECIMEN
Discharge: HOME | End: 2019-01-21
Payer: COMMERCIAL

## 2019-01-24 LAB — SURGICAL PATHOLOGY REPORT: NORMAL

## 2019-01-31 ENCOUNTER — OFFICE VISIT (OUTPATIENT)
Dept: OBGYN CLINIC | Age: 39
End: 2019-01-31
Payer: COMMERCIAL

## 2019-01-31 ENCOUNTER — HOSPITAL ENCOUNTER (OUTPATIENT)
Age: 39
Setting detail: SPECIMEN
Discharge: HOME | End: 2019-01-31
Payer: COMMERCIAL

## 2019-01-31 VITALS — BODY MASS INDEX: 27.02 KG/M2 | SYSTOLIC BLOOD PRESSURE: 110 MMHG | WEIGHT: 183 LBS | DIASTOLIC BLOOD PRESSURE: 64 MMHG

## 2019-01-31 DIAGNOSIS — Z98.890 H/O LEEP: ICD-10-CM

## 2019-01-31 DIAGNOSIS — N90.0 VULVAR INTRAEPITHELIAL NEOPLASIA (VIN) GRADE 1: ICD-10-CM

## 2019-01-31 DIAGNOSIS — G43.009 MIGRAINE WITHOUT AURA AND WITHOUT STATUS MIGRAINOSUS, NOT INTRACTABLE: ICD-10-CM

## 2019-01-31 DIAGNOSIS — N87.1 DYSPLASIA OF CERVIX, HIGH GRADE CIN 2: ICD-10-CM

## 2019-01-31 DIAGNOSIS — N89.8 VAGINAL IRRITATION: ICD-10-CM

## 2019-01-31 DIAGNOSIS — B97.7 HIGH RISK HPV INFECTION: ICD-10-CM

## 2019-01-31 DIAGNOSIS — Z01.419 ENCOUNTER FOR GYNECOLOGICAL EXAMINATION: Primary | ICD-10-CM

## 2019-01-31 LAB
DIRECT EXAM: NORMAL
Lab: NORMAL
SPECIMEN DESCRIPTION: NORMAL
STATUS: NORMAL

## 2019-01-31 PROCEDURE — 99395 PREV VISIT EST AGE 18-39: CPT | Performed by: NURSE PRACTITIONER

## 2019-01-31 RX ORDER — TOPIRAMATE 50 MG/1
TABLET, FILM COATED ORAL
Refills: 3 | COMMUNITY
Start: 2019-01-22

## 2019-01-31 ASSESSMENT — ENCOUNTER SYMPTOMS
BACK PAIN: 0
DIARRHEA: 1
RHINORRHEA: 0
CONSTIPATION: 1
COUGH: 0
VOMITING: 0
NAUSEA: 0
ABDOMINAL PAIN: 0
SHORTNESS OF BREATH: 0
COLOR CHANGE: 0

## 2019-02-03 LAB
CULTURE: NORMAL
Lab: NORMAL
SPECIMEN DESCRIPTION: NORMAL
STATUS: NORMAL

## 2019-02-04 RX ORDER — FOLIC ACID 1 MG/1
1 TABLET ORAL DAILY
Qty: 90 TABLET | Refills: 1 | Status: SHIPPED | OUTPATIENT
Start: 2019-02-04 | End: 2019-08-06 | Stop reason: SDUPTHER

## 2019-02-06 ENCOUNTER — TELEPHONE (OUTPATIENT)
Dept: OBGYN CLINIC | Age: 39
End: 2019-02-06

## 2019-02-06 DIAGNOSIS — L90.0 LICHEN SCLEROSUS: ICD-10-CM

## 2019-02-07 RX ORDER — CLOBETASOL PROPIONATE 0.5 MG/G
CREAM TOPICAL EVERY OTHER DAY
Qty: 30 G | Refills: 0 | Status: SHIPPED | OUTPATIENT
Start: 2019-02-07 | End: 2019-05-16 | Stop reason: SDUPTHER

## 2019-02-13 LAB — CYTOLOGY REPORT: NORMAL

## 2019-02-28 ENCOUNTER — HOSPITAL ENCOUNTER (OUTPATIENT)
Age: 39
Setting detail: SPECIMEN
Discharge: HOME | End: 2019-02-28
Payer: COMMERCIAL

## 2019-02-28 ENCOUNTER — PROCEDURE VISIT (OUTPATIENT)
Dept: OBGYN CLINIC | Age: 39
End: 2019-02-28
Payer: COMMERCIAL

## 2019-02-28 VITALS
DIASTOLIC BLOOD PRESSURE: 70 MMHG | HEIGHT: 69 IN | WEIGHT: 184 LBS | SYSTOLIC BLOOD PRESSURE: 118 MMHG | BODY MASS INDEX: 27.25 KG/M2

## 2019-02-28 DIAGNOSIS — Z01.812 PRE-PROCEDURE LAB EXAM: ICD-10-CM

## 2019-02-28 DIAGNOSIS — R87.611 PAP SMEAR OF CERVIX WITH ASCUS, CANNOT EXCLUDE HGSIL: Primary | ICD-10-CM

## 2019-02-28 DIAGNOSIS — B97.7 HIGH RISK HPV INFECTION: ICD-10-CM

## 2019-02-28 LAB
CONTROL: NORMAL
PREGNANCY TEST URINE, POC: NEGATIVE

## 2019-02-28 PROCEDURE — 57454 BX/CURETT OF CERVIX W/SCOPE: CPT | Performed by: OBSTETRICS & GYNECOLOGY

## 2019-02-28 PROCEDURE — 81025 URINE PREGNANCY TEST: CPT | Performed by: OBSTETRICS & GYNECOLOGY

## 2019-03-04 LAB — SURGICAL PATHOLOGY REPORT: NORMAL

## 2019-03-07 DIAGNOSIS — F33.0 MILD EPISODE OF RECURRENT MAJOR DEPRESSIVE DISORDER (HCC): ICD-10-CM

## 2019-03-08 RX ORDER — AMITRIPTYLINE HYDROCHLORIDE 25 MG/1
25 TABLET, FILM COATED ORAL NIGHTLY
Qty: 30 TABLET | Refills: 2 | Status: SHIPPED | OUTPATIENT
Start: 2019-03-08 | End: 2019-07-11 | Stop reason: SDUPTHER

## 2019-03-21 ENCOUNTER — HOSPITAL ENCOUNTER (OUTPATIENT)
Age: 39
Setting detail: SPECIMEN
Discharge: HOME | End: 2019-03-21
Payer: COMMERCIAL

## 2019-03-26 LAB — SURGICAL PATHOLOGY REPORT: NORMAL

## 2019-04-04 DIAGNOSIS — N89.8 VAGINAL IRRITATION: ICD-10-CM

## 2019-04-04 DIAGNOSIS — N89.8 VAGINAL DISCHARGE: ICD-10-CM

## 2019-04-04 DIAGNOSIS — N89.8 VAGINAL ITCHING: ICD-10-CM

## 2019-05-09 ENCOUNTER — PATIENT MESSAGE (OUTPATIENT)
Dept: OBGYN CLINIC | Age: 39
End: 2019-05-09

## 2019-05-09 RX ORDER — FLUCONAZOLE 150 MG/1
150 TABLET ORAL ONCE
Qty: 1 TABLET | Refills: 0 | Status: SHIPPED | OUTPATIENT
Start: 2019-05-09 | End: 2019-05-09

## 2019-05-09 NOTE — TELEPHONE ENCOUNTER
From: Jie Silva  To: Jacqui Ann DO  Sent: 5/9/2019 12:10 PM EDT  Subject: Prescription Question    Dr. Cuco Cooper,     I am having symptoms of of yeast infection for a couple days now. Im itchy and it hurts. I was hoping that I can some medicine prescribed to help.     Jie Silva

## 2019-05-16 DIAGNOSIS — L90.0 LICHEN SCLEROSUS: ICD-10-CM

## 2019-05-16 RX ORDER — CLOBETASOL PROPIONATE 0.5 MG/G
CREAM TOPICAL
Qty: 30 G | Refills: 0 | Status: SHIPPED | OUTPATIENT
Start: 2019-05-16 | End: 2019-06-29 | Stop reason: SDUPTHER

## 2019-05-20 ENCOUNTER — TELEPHONE (OUTPATIENT)
Dept: OBGYN CLINIC | Age: 39
End: 2019-05-20

## 2019-05-20 RX ORDER — FLUCONAZOLE 150 MG/1
150 TABLET ORAL ONCE
Qty: 1 TABLET | Refills: 0 | OUTPATIENT
Start: 2019-05-20 | End: 2019-05-20

## 2019-06-27 ENCOUNTER — HOSPITAL ENCOUNTER (OUTPATIENT)
Age: 39
Setting detail: SPECIMEN
Discharge: HOME | End: 2019-06-27
Payer: COMMERCIAL

## 2019-06-27 ENCOUNTER — OFFICE VISIT (OUTPATIENT)
Dept: OBGYN CLINIC | Age: 39
End: 2019-06-27
Payer: COMMERCIAL

## 2019-06-27 VITALS
SYSTOLIC BLOOD PRESSURE: 114 MMHG | RESPIRATION RATE: 16 BRPM | DIASTOLIC BLOOD PRESSURE: 72 MMHG | BODY MASS INDEX: 27.04 KG/M2 | WEIGHT: 183.13 LBS

## 2019-06-27 DIAGNOSIS — N89.8 VAGINAL DISCHARGE: Primary | ICD-10-CM

## 2019-06-27 DIAGNOSIS — Z30.431 IUD CHECK UP: ICD-10-CM

## 2019-06-27 DIAGNOSIS — N89.8 VAGINAL DISCHARGE: ICD-10-CM

## 2019-06-27 DIAGNOSIS — R10.2 PELVIC PAIN: ICD-10-CM

## 2019-06-27 LAB
DIRECT EXAM: NORMAL
Lab: NORMAL
SPECIMEN DESCRIPTION: NORMAL

## 2019-06-27 PROCEDURE — 99213 OFFICE O/P EST LOW 20 MIN: CPT | Performed by: NURSE PRACTITIONER

## 2019-06-27 NOTE — PATIENT INSTRUCTIONS
Patient Education        Vaginitis: Care Instructions  Your Care Instructions    Vaginitis is soreness or infection of the vagina. This common problem can cause itching and burning. And it can cause a change in vaginal discharge. Sometimes it can cause pain during sex. Vaginitis may be caused by bacteria, yeast, or other germs. Some infections that cause it are caught from a sexual partner. Bath products, spermicides, and douches can irritate the vagina too. Some women have this problem during and after menopause. A drop in estrogen levels during this time can cause dryness, soreness, and pain during sex. Your doctor can give you medicine to treat an infection. And home care may help you feel better. For certain types of infections, your sex partner must be treated too. Follow-up care is a key part of your treatment and safety. Be sure to make and go to all appointments, and call your doctor if you are having problems. It's also a good idea to know your test results and keep a list of the medicines you take. How can you care for yourself at home? · If your doctor prescribed antibiotics, take them as directed. Do not stop taking them just because you feel better. You need to take the full course of antibiotics. · Take your medicines exactly as prescribed. Call your doctor if you think you are having a problem with your medicine. · Do not eat or drink anything that has alcohol if you are taking metronidazole (Flagyl). · If you have a yeast infection, use over-the-counter products as your doctor tells you to. Or take medicine your doctor prescribes exactly as directed. · Wash your vaginal area daily with water. You also can use a mild, unscented soap if you want. · Do not use scented bath products. And do not use vaginal sprays or douches. · Put a washcloth soaked in cool water on the area to relieve itching. Or you can take cool baths.   · If you have dryness because of menopause, use estrogen cream or pills can check that the IUD is in place by feeling for the string. The IUD usually stays in the uterus until your doctor removes it. How well does it work? In the first year of use:  · When the hormonal IUD is used exactly as directed, fewer than 1 woman out of 100 has an unplanned pregnancy. · When the copper IUD is used exactly as directed, fewer than 1 woman out of 100 has an unplanned pregnancy. Be sure to tell your doctor about any health problems you have or medicines you take. He or she can help you choose the birth control method that is right for you. What are the advantages of an IUD? · An IUD is one of the most effective methods of birth control. · It prevents pregnancy for 3 to 10 years, depending on the type. You don't have to worry about birth control during this time. · It's safe to use while breastfeeding. · IUDs don't contain estrogen. So you can use an IUD if you don't want to take estrogen or can't take estrogen because you have certain health problems or concerns. · An IUD is convenient. It is always providing birth control. You don't need to remember to take a pill or get a shot. You don't have to interrupt sex to protect against pregnancy. · A hormonal IUD may reduce heavy bleeding and cramping. What are the disadvantages of an IUD? · An IUD doesn't protect against sexually transmitted infections (STIs), such as herpes or HIV/AIDS. If you aren't sure if your sex partner might have an STI, use a condom to protect against disease. · A copper IUD may cause periods with more bleeding and cramping. · You have to see a doctor to have an IUD inserted and removed. · You have to check to see if the string is in place. Where can you learn more? Go to https://Yummy Foodfranko.health-partners. org and sign in to your MicroQuant account. Enter F586 in the Buzzmove box to learn more about \"Learning About Birth Control: Intrauterine Device (IUD). \"     If you do not have an account, please click on the \"Sign Up Now\" link. Current as of: September 5, 2018  Content Version: 12.0  © 8585-2038 Healthwise, Incorporated. Care instructions adapted under license by [facility] ([facility]). If you have questions about a medical condition or this instruction, always ask your healthcare professional. [address] any warranty or liability for your use of this information.

## 2019-06-27 NOTE — PROGRESS NOTES
[address] [facility] [address]  Dept: [phone]  Dept Fax: [phone]    Jen Ramsey is a 45 y.o. female who presents today for her medical conditions/complaintsas noted below. Jen Ramsey is c/o of Vaginal Discharge        HPI:     HPI  Pt is here with complaints of a vaginal irritation intermittently since May took diflucan twice and no relief. She states vaginal area painful now and  itching. Positive  discharge with no Odor,   no Burning. No UTI sx. States had intercourse in May for the first time in 4 years in May and had severe pain with intercourse and even after,  Still having intermittent lower abdominal cramping at times. No f/c, n/v. She states used a condom. She has IUD in place.        Past Medical History:   Diagnosis Date    Acne     Allergic rhinitis     Depression     Esophageal reflux     Functional dyspepsia     Headache(784.0)     HPV (human papilloma virus) anogenital infection     Hyperlipidemia     Syncope     Unspecified constipation     Unspecified sleep apnea       Past Surgical History:   Procedure Laterality Date    BREAST SURGERY Left     cyst removed    COLONOSCOPY N/A 05/14/2014    RECTAL BIOPSY, normal mucosa    COLPOSCOPY  2017    INTRAUTERINE DEVICE INSERTION  05/13/2015    Mirena    [place] COLPOSCOPY,CERVIX W/ADJ VAG,W/LOOP BX N/A 7/5/2018    LEEP performed by Duy Barnett DO at . [facility] 142 ENDOSCOPY  12/10/2014    Small hiatal hernia. esophageal ring     WISDOM TOOTH EXTRACTION         Family History   Problem Relation Age of Onset    Parkinsonism Other     Diabetes Mother     Cancer Mother         Breast    Hypertension Mother     Other Mother         pacemaker    Arthritis Father     Heart Disease Maternal Grandmother     Diabetes Maternal Grandmother     Cancer Maternal Grandfather     Diabetes Paternal Grandmother     Heart Disease Paternal Grandmother     Diabetes Paternal Grandfather     Heart Disease Paternal Grandfather        Social History     Tobacco Use    Smoking status: Never Smoker    Smokeless tobacco: Never Used   Substance Use Topics    Alcohol use: Yes     Alcohol/week: 0.0 oz     Comment: occasional      Current Outpatient Medications   Medication Sig Dispense Refill    clobetasol (TEMOVATE) 0.05 % cream APPLY TOPICALLY EVERY OTHER DAY 30 g 0    atorvastatin (LIPITOR) 40 MG tablet TAKE 1 TABLET BY MOUTH DAILY 90 tablet 3    lidocaine (XYLOCAINE) 2 % jelly APPLY TOPICALLY AS NEEDED AS DIRECTED 30 mL 0    amitriptyline (ELAVIL) 25 MG tablet Take 1 tablet by mouth nightly 30 tablet 2    folic acid (FOLVITE) 1 MG tablet Take 1 tablet by mouth daily 90 tablet 1    topiramate (TOPAMAX) 50 MG tablet   3    levonorgestrel (MIRENA) IUD 52 mg 1 each by Intrauterine route      ondansetron (ZOFRAN-ODT) 4 MG disintegrating tablet Take 1 tablet by mouth every 8 hours as needed for Nausea or Vomiting 5 tablet 0    spironolactone (ALDACTONE) 50 MG tablet Take 1 tablet by mouth daily  5    SUMAtriptan (IMITREX) 50 MG tablet Take 50 mg by mouth once as needed for Migraine      fluticasone (FLONASE) 50 MCG/ACT nasal spray 1 spray by Nasal route every morning (before breakfast) 1 Bottle 5    sucralfate (CARAFATE) 1 GM tablet TAKE 1 TABLET 4 TIMES DAILY 120 tablet 1    topiramate (TOPAMAX) 50 MG tablet Take 1 tablet by mouth 2 times daily (Patient taking differently: Take 75 mg by mouth 2 times daily ) 180 tablet 0    omeprazole (PRILOSEC) 40 MG delayed release capsule Take 1 capsule by mouth daily Will need office appt for additional refills 90 capsule 0    EPINEPHrine (EPIPEN) 0.3 MG/0.3ML SOAJ injection Inject 0.3 mg into the muscle as needed      fexofenadine (ALLEGRA) 180 MG tablet Take 180 mg by mouth daily. No current facility-administered medications for this visit.       Allergies Genitourinary Comments: iud strings visible. Musculoskeletal: Normal range of motion. Lymphadenopathy:        Right: No inguinal adenopathy present. Left: No inguinal adenopathy present. Neurological: She is alert and oriented to person, place, and time. No cranial nerve deficit. Skin: Skin is warm and dry. No rash noted. She is not diaphoretic. Psychiatric: She has a normal mood and affect. Her behavior is normal. Judgment and thought content normal.   Nursing note and vitals reviewed. /72 (Site: Left Upper Arm, Position: Sitting, Cuff Size: Large Adult)   Resp 16   Wt 183 lb 2 oz (83.1 kg)   BMI 27.04 kg/m²     Assessment:       Diagnosis Orders   1. Vaginal discharge  VAGINITIS DNA PROBE    C.trachomatis N.gonorrhoeae DNA    Strep B Screen, Vaginal / Rectal   2. Pelvic pain     3. IUD check up       Pelvic exam- cultures obtained and sent  Plan:      Vag d/c irritaion- she uses clebatsol already and lidocaine if needed continue clobetasol right now 2 times a day for next week has irritation to exterior. Check pelvic labs as above tx if positive. Pelvic pain- checked us in office awaiting official reading but IUD appears in place and she has left ovarian cyst at largest dimension 4.8, no free fluid. no hx of cysts no family hx of ovarian cancer. Discussed could be possible pain cause will monitor discussed torsion precautions with pt. Will return 6 weeks repeat us and recheck if symptoms resolved may consider cancelling us. Return if symptoms worsen or fail to improve. Orders Placed This Encounter   Procedures    VAGINITIS DNA PROBE    C.trachomatis N.gonorrhoeae DNA     Standing Status:   Future     Number of Occurrences:   1     Standing Expiration Date:   6/27/2020    Strep B Screen, Vaginal / Rectal         Patient given educational materials - seepatient instructions. Discussed use, benefit, and side effects of prescribed medications. All patient questions answered. Pt voiced understanding. Reviewed health maintenance. Instructed to continue current medications, diet and exercise. Patient agreedwith treatment plan. Follow up as directed. Electronically signed by ROSELINE Coon CNP on 6/27/2019at 5:22 PM      Of the 15 minute duration appointment visit, Lexy Iniguez CNP spent at least 50% of the face-to-face time in counseling, explanation of diagnosis, planning of further management, and answering all questions.

## 2019-06-28 LAB
C TRACH DNA GENITAL QL NAA+PROBE: NEGATIVE
N. GONORRHOEAE DNA: NEGATIVE
SPECIMEN DESCRIPTION: NORMAL

## 2019-06-29 DIAGNOSIS — L90.0 LICHEN SCLEROSUS: ICD-10-CM

## 2019-06-30 LAB
CULTURE: NORMAL
Lab: NORMAL
SPECIMEN DESCRIPTION: NORMAL

## 2019-07-01 RX ORDER — CLOBETASOL PROPIONATE 0.5 MG/G
OINTMENT TOPICAL
Qty: 30 G | Refills: 0 | Status: SHIPPED | OUTPATIENT
Start: 2019-07-01 | End: 2019-07-24 | Stop reason: SDUPTHER

## 2019-07-04 PROBLEM — N83.202 LEFT OVARIAN CYST: Status: ACTIVE | Noted: 2019-07-04

## 2019-07-17 ENCOUNTER — PATIENT MESSAGE (OUTPATIENT)
Dept: OBGYN CLINIC | Age: 39
End: 2019-07-17

## 2019-07-24 DIAGNOSIS — L90.0 LICHEN SCLEROSUS: ICD-10-CM

## 2019-07-24 RX ORDER — CLOBETASOL PROPIONATE 0.5 MG/G
OINTMENT TOPICAL
Qty: 30 G | Refills: 0 | Status: SHIPPED | OUTPATIENT
Start: 2019-07-24 | End: 2019-08-22

## 2019-07-25 ENCOUNTER — OFFICE VISIT (OUTPATIENT)
Dept: FAMILY MEDICINE CLINIC | Age: 39
End: 2019-07-25
Payer: COMMERCIAL

## 2019-07-25 VITALS
RESPIRATION RATE: 18 BRPM | BODY MASS INDEX: 27.02 KG/M2 | DIASTOLIC BLOOD PRESSURE: 70 MMHG | HEART RATE: 90 BPM | TEMPERATURE: 97.2 F | SYSTOLIC BLOOD PRESSURE: 120 MMHG | WEIGHT: 183 LBS

## 2019-07-25 DIAGNOSIS — J04.0 LARYNGITIS: Primary | ICD-10-CM

## 2019-07-25 PROCEDURE — 99213 OFFICE O/P EST LOW 20 MIN: CPT | Performed by: NURSE PRACTITIONER

## 2019-07-25 RX ORDER — PREDNISONE 20 MG/1
40 TABLET ORAL DAILY
Qty: 10 TABLET | Refills: 0 | Status: SHIPPED | OUTPATIENT
Start: 2019-07-25 | End: 2019-07-30

## 2019-07-25 NOTE — PROGRESS NOTES
(TEMOVATE) 0.05 % ointment APPLY TOPICALLY EVERY OTHER DAY AS DIRECTED 30 g 0    amitriptyline (ELAVIL) 25 MG tablet Take 1 tablet by mouth nightly 30 tablet 0    atorvastatin (LIPITOR) 40 MG tablet TAKE 1 TABLET BY MOUTH DAILY 90 tablet 3    lidocaine (XYLOCAINE) 2 % jelly APPLY TOPICALLY AS NEEDED AS DIRECTED 30 mL 0    folic acid (FOLVITE) 1 MG tablet Take 1 tablet by mouth daily 90 tablet 1    topiramate (TOPAMAX) 50 MG tablet   3    levonorgestrel (MIRENA) IUD 52 mg 1 each by Intrauterine route      ondansetron (ZOFRAN-ODT) 4 MG disintegrating tablet Take 1 tablet by mouth every 8 hours as needed for Nausea or Vomiting 5 tablet 0    spironolactone (ALDACTONE) 50 MG tablet Take 1 tablet by mouth daily  5    SUMAtriptan (IMITREX) 50 MG tablet Take 50 mg by mouth once as needed for Migraine      fluticasone (FLONASE) 50 MCG/ACT nasal spray 1 spray by Nasal route every morning (before breakfast) 1 Bottle 5    sucralfate (CARAFATE) 1 GM tablet TAKE 1 TABLET 4 TIMES DAILY 120 tablet 1    topiramate (TOPAMAX) 50 MG tablet Take 1 tablet by mouth 2 times daily (Patient taking differently: Take 75 mg by mouth 2 times daily ) 180 tablet 0    omeprazole (PRILOSEC) 40 MG delayed release capsule Take 1 capsule by mouth daily Will need office appt for additional refills 90 capsule 0    EPINEPHrine (EPIPEN) 0.3 MG/0.3ML SOAJ injection Inject 0.3 mg into the muscle as needed      fexofenadine (ALLEGRA) 180 MG tablet Take 180 mg by mouth daily. No current facility-administered medications for this visit. HPI    Patient presents the office today with concerns regarding voice hoarseness. Works as a , has had a cold recently. Cold symptoms am starting to improve however patient's voice is still very raspy. This makes it very hard to complete her job. No current fevers or chills. Did have some postnasal drainage though this is improving. URI symptoms overall improving.   Getting adequate

## 2019-07-25 NOTE — PATIENT INSTRUCTIONS
clear your throat. This can cause more irritation of your larynx. Take an over-the-counter cough suppressant (if your doctor recommends it) if you have a dry cough that does not produce mucus. · Do not smoke or allow others to smoke around you. If you need help quitting, talk to your doctor about stop-smoking programs and medicines. These can increase your chances of quitting for good. · Use a humidifier or vaporizer to add moisture to your bedroom. Humidity helps to thin the mucus in the nasal membranes that causes stuffiness or postnasal drip. Follow the directions for cleaning the machine. · Drink plenty of fluids, enough so that your urine is light yellow or clear like water. If you have kidney, heart, or liver disease and have to limit fluids, talk with your doctor before you increase the amount of fluids you drink. · Use saline (saltwater) nasal washes to help keep your nasal passages open and wash out mucus and bacteria. You can buy saline nose drops at a grocery store or drugstore. Or, you can make your own at home by mixing ½ teaspoon salt, 1 cup water (at room temperature), and ½ teaspoon baking soda. If you make your own, fill a bulb syringe with the solution, insert the tip into your nostril, and squeeze gently. [place] your nose. When should you call for help? Call 911 anytime you think you may need emergency care. For example, call if:    · You have trouble breathing.    Call your doctor now or seek immediate medical care if:    · You have new or worse pain.     · You have trouble swallowing.    Watch closely for changes in your health, and be sure to contact your doctor if:    · You do not get better as expected. Where can you learn more? Go to https://UnBuyThatpejuan joseeb.Car Advisory Network. org and sign in to your LightArrow account. Enter V810 in the NeuroQuest box to learn more about \"Laryngitis: Care Instructions. \"     If you do not have an account, please click on the \"Sign Up Now\"

## 2019-07-26 ASSESSMENT — ENCOUNTER SYMPTOMS
SORE THROAT: 0
RHINORRHEA: 0
VOICE CHANGE: 1
SHORTNESS OF BREATH: 0
VOMITING: 0
COUGH: 1
BACK PAIN: 0
CONSTIPATION: 0
ABDOMINAL PAIN: 0
WHEEZING: 0
NAUSEA: 0
DIARRHEA: 0
CHEST TIGHTNESS: 0

## 2019-08-01 ENCOUNTER — OFFICE VISIT (OUTPATIENT)
Dept: OBGYN CLINIC | Age: 39
End: 2019-08-01
Payer: COMMERCIAL

## 2019-08-01 ENCOUNTER — HOSPITAL ENCOUNTER (OUTPATIENT)
Age: 39
Setting detail: SPECIMEN
Discharge: HOME | End: 2019-08-01
Payer: COMMERCIAL

## 2019-08-01 VITALS — BODY MASS INDEX: 27.32 KG/M2 | DIASTOLIC BLOOD PRESSURE: 62 MMHG | WEIGHT: 185 LBS | SYSTOLIC BLOOD PRESSURE: 118 MMHG

## 2019-08-01 DIAGNOSIS — N83.202 LEFT OVARIAN CYST: ICD-10-CM

## 2019-08-01 DIAGNOSIS — N89.8 VAGINAL ITCHING: ICD-10-CM

## 2019-08-01 DIAGNOSIS — R35.0 URINARY FREQUENCY: ICD-10-CM

## 2019-08-01 DIAGNOSIS — R10.2 PELVIC PAIN: ICD-10-CM

## 2019-08-01 DIAGNOSIS — N89.8 VAGINAL IRRITATION: Primary | ICD-10-CM

## 2019-08-01 DIAGNOSIS — N89.8 VAGINAL IRRITATION: ICD-10-CM

## 2019-08-01 LAB
DIRECT EXAM: NORMAL
Lab: NORMAL
SPECIMEN DESCRIPTION: NORMAL

## 2019-08-01 PROCEDURE — 99214 OFFICE O/P EST MOD 30 MIN: CPT | Performed by: NURSE PRACTITIONER

## 2019-08-01 RX ORDER — CLOTRIMAZOLE AND BETAMETHASONE DIPROPIONATE 10; .64 MG/G; MG/G
CREAM TOPICAL
Qty: 1 TUBE | Refills: 0 | Status: SHIPPED | OUTPATIENT
Start: 2019-08-01 | End: 2019-09-12 | Stop reason: SDUPTHER

## 2019-08-01 ASSESSMENT — ENCOUNTER SYMPTOMS
WHEEZING: 0
COLOR CHANGE: 0
ABDOMINAL PAIN: 0
NAUSEA: 0
VOMITING: 0
SHORTNESS OF BREATH: 0

## 2019-08-01 NOTE — PROGRESS NOTES
performed by Leela Johnson DO at [facility] 64 ENDOSCOPY  12/10/2014    Small hiatal hernia. esophageal ring     WISDOM TOOTH EXTRACTION         Family History   Problem Relation Age of Onset    Parkinsonism Other     Diabetes Mother     Cancer Mother         Breast    Hypertension Mother     Other Mother         pacemaker    Arthritis Father     Heart Disease Maternal Grandmother     Diabetes Maternal Grandmother     Cancer Maternal Grandfather     Diabetes Paternal Grandmother     Heart Disease Paternal Grandmother     Diabetes Paternal Grandfather     Heart Disease Paternal Grandfather        Social History     Tobacco Use    Smoking status: Never Smoker    Smokeless tobacco: Never Used   Substance Use Topics    Alcohol use: Yes     Alcohol/week: 0.0 standard drinks     Comment: occasional      Current Outpatient Medications   Medication Sig Dispense Refill    clotrimazole-betamethasone (LOTRISONE) 1-0.05 % cream Apply externally bid x 3 days then daily x 4 days.  1 Tube 0    clobetasol (TEMOVATE) 0.05 % ointment APPLY TOPICALLY EVERY OTHER DAY AS DIRECTED 30 g 0    amitriptyline (ELAVIL) 25 MG tablet Take 1 tablet by mouth nightly 30 tablet 0    atorvastatin (LIPITOR) 40 MG tablet TAKE 1 TABLET BY MOUTH DAILY 90 tablet 3    lidocaine (XYLOCAINE) 2 % jelly APPLY TOPICALLY AS NEEDED AS DIRECTED 30 mL 0    folic acid (FOLVITE) 1 MG tablet Take 1 tablet by mouth daily 90 tablet 1    topiramate (TOPAMAX) 50 MG tablet   3    levonorgestrel (MIRENA) IUD 52 mg 1 each by Intrauterine route      ondansetron (ZOFRAN-ODT) 4 MG disintegrating tablet Take 1 tablet by mouth every 8 hours as needed for Nausea or Vomiting 5 tablet 0    spironolactone (ALDACTONE) 50 MG tablet Take 1 tablet by mouth daily  5    SUMAtriptan (IMITREX) 50 MG tablet Take 50 mg by mouth once as needed for Migraine      fluticasone (FLONASE) 50 MCG/ACT nasal spray 1 spray by Nasal

## 2019-08-01 NOTE — PATIENT INSTRUCTIONS
Patient Education      Patient Education        Vaginitis: Care Instructions  Your Care Instructions    Vaginitis is soreness or infection of the vagina. This common problem can cause itching and burning. And it can cause a change in vaginal discharge. Sometimes it can cause pain during sex. Vaginitis may be caused by bacteria, yeast, or other germs. Some infections that cause it are caught from a sexual partner. Bath products, spermicides, and douches can irritate the vagina too. Some women have this problem during and after menopause. A drop in estrogen levels during this time can cause dryness, soreness, and pain during sex. Your doctor can give you medicine to treat an infection. And home care may help you feel better. For certain types of infections, your sex partner must be treated too. Follow-up care is a key part of your treatment and safety. Be sure to make and go to all appointments, and call your doctor if you are having problems. It's also a good idea to know your test results and keep a list of the medicines you take. How can you care for yourself at home? · If your doctor prescribed antibiotics, take them as directed. Do not stop taking them just because you feel better. You need to take the full course of antibiotics. · Take your medicines exactly as prescribed. Call your doctor if you think you are having a problem with your medicine. · Do not eat or drink anything that has alcohol if you are taking metronidazole (Flagyl). · If you have a yeast infection, use over-the-counter products as your doctor tells you to. Or take medicine your doctor prescribes exactly as directed. · Wash your vaginal area daily with water. You also can use a mild, unscented soap if you want. · Do not use scented bath products. And do not use vaginal sprays or douches. · Put a washcloth soaked in cool water on the area to relieve itching. Or you can take cool baths.   · If you have dryness because of menopause, use

## 2019-08-05 ENCOUNTER — TELEPHONE (OUTPATIENT)
Dept: OBGYN CLINIC | Age: 39
End: 2019-08-05

## 2019-08-05 LAB
-: ABNORMAL
AMORPHOUS: ABNORMAL
BACTERIA: ABNORMAL
BILIRUBIN URINE: NEGATIVE
CASTS UA: ABNORMAL /LPF (ref 0–2)
COLOR: YELLOW
COMMENT UA: ABNORMAL
CRYSTALS, UA: ABNORMAL /HPF
CRYSTALS, UA: ABNORMAL /HPF
EPITHELIAL CELLS UA: ABNORMAL /HPF (ref 0–5)
GLUCOSE URINE: NEGATIVE
KETONES, URINE: NEGATIVE
LEUKOCYTE ESTERASE, URINE: NEGATIVE
MUCUS: ABNORMAL
NITRITE, URINE: NEGATIVE
OTHER OBSERVATIONS UA: ABNORMAL
PH UA: 7 (ref 5–8)
PHYSICIAN ID COMMENT: NORMAL
PHYSICIAN: NORMAL
PROTEIN UA: NEGATIVE
RBC UA: ABNORMAL /HPF (ref 0–2)
RENAL EPITHELIAL, UA: ABNORMAL /HPF
SPECIFIC GRAVITY UA: 1.02 (ref 1–1.03)
TRICHOMONAS: ABNORMAL
TURBIDITY: ABNORMAL
URINE HGB: NEGATIVE
UROBILINOGEN, URINE: NORMAL
WBC UA: ABNORMAL /HPF (ref 0–5)
YEAST: ABNORMAL
ZZ NTE CLEAN UP: ORDERED TEST: NORMAL
ZZ NTE WITH NAME CLEAN UP: SPECIMEN SOURCE: NORMAL

## 2019-08-06 ENCOUNTER — OFFICE VISIT (OUTPATIENT)
Dept: FAMILY MEDICINE CLINIC | Age: 39
End: 2019-08-06
Payer: COMMERCIAL

## 2019-08-06 ENCOUNTER — HOSPITAL ENCOUNTER (OUTPATIENT)
Age: 39
Setting detail: SPECIMEN
Discharge: HOME | End: 2019-08-06
Payer: COMMERCIAL

## 2019-08-06 VITALS
TEMPERATURE: 99.7 F | BODY MASS INDEX: 27.47 KG/M2 | WEIGHT: 186 LBS | SYSTOLIC BLOOD PRESSURE: 116 MMHG | RESPIRATION RATE: 16 BRPM | DIASTOLIC BLOOD PRESSURE: 80 MMHG | HEART RATE: 72 BPM

## 2019-08-06 DIAGNOSIS — G43.009 MIGRAINE WITHOUT AURA AND WITHOUT STATUS MIGRAINOSUS, NOT INTRACTABLE: Primary | ICD-10-CM

## 2019-08-06 DIAGNOSIS — E78.00 PURE HYPERCHOLESTEROLEMIA: ICD-10-CM

## 2019-08-06 DIAGNOSIS — F33.0 MILD EPISODE OF RECURRENT MAJOR DEPRESSIVE DISORDER (HCC): ICD-10-CM

## 2019-08-06 DIAGNOSIS — F41.9 ANXIETY: ICD-10-CM

## 2019-08-06 DIAGNOSIS — J30.1 SEASONAL ALLERGIC RHINITIS DUE TO POLLEN: ICD-10-CM

## 2019-08-06 LAB
ALBUMIN SERPL-MCNC: 4.4 G/DL (ref 3.5–5.2)
ALBUMIN/GLOBULIN RATIO: 1.6 (ref 1–2.5)
ALP BLD-CCNC: 83 U/L (ref 35–104)
ALT SERPL-CCNC: 18 U/L (ref 5–33)
ANION GAP SERPL CALCULATED.3IONS-SCNC: 16 MMOL/L (ref 9–17)
AST SERPL-CCNC: 13 U/L
BILIRUB SERPL-MCNC: 0.23 MG/DL (ref 0.3–1.2)
BUN BLDV-MCNC: 15 MG/DL (ref 6–20)
BUN/CREAT BLD: ABNORMAL (ref 9–20)
CALCIUM SERPL-MCNC: 10 MG/DL (ref 8.6–10.4)
CHLORIDE BLD-SCNC: 105 MMOL/L (ref 98–107)
CHOLESTEROL/HDL RATIO: 3.3
CHOLESTEROL: 180 MG/DL
CO2: 21 MMOL/L (ref 20–31)
CREAT SERPL-MCNC: 0.62 MG/DL (ref 0.5–0.9)
GFR AFRICAN AMERICAN: >60 ML/MIN
GFR NON-AFRICAN AMERICAN: >60 ML/MIN
GFR SERPL CREATININE-BSD FRML MDRD: ABNORMAL ML/MIN/{1.73_M2}
GFR SERPL CREATININE-BSD FRML MDRD: ABNORMAL ML/MIN/{1.73_M2}
GLUCOSE BLD-MCNC: 76 MG/DL (ref 70–99)
HCT VFR BLD CALC: 43.3 % (ref 36.3–47.1)
HDLC SERPL-MCNC: 55 MG/DL
HEMOGLOBIN: 12.9 G/DL (ref 11.9–15.1)
LDL CHOLESTEROL: 109 MG/DL (ref 0–130)
MCH RBC QN AUTO: 29.1 PG (ref 25.2–33.5)
MCHC RBC AUTO-ENTMCNC: 29.8 G/DL (ref 28.4–34.8)
MCV RBC AUTO: 97.5 FL (ref 82.6–102.9)
NRBC AUTOMATED: 0 PER 100 WBC
PDW BLD-RTO: 14 % (ref 11.8–14.4)
PLATELET # BLD: 434 K/UL (ref 138–453)
PMV BLD AUTO: 9.5 FL (ref 8.1–13.5)
POTASSIUM SERPL-SCNC: 4.7 MMOL/L (ref 3.7–5.3)
RBC # BLD: 4.44 M/UL (ref 3.95–5.11)
SODIUM BLD-SCNC: 142 MMOL/L (ref 135–144)
TOTAL PROTEIN: 7.2 G/DL (ref 6.4–8.3)
TRIGL SERPL-MCNC: 81 MG/DL
VLDLC SERPL CALC-MCNC: NORMAL MG/DL (ref 1–30)
WBC # BLD: 10.2 K/UL (ref 3.5–11.3)

## 2019-08-06 PROCEDURE — 99214 OFFICE O/P EST MOD 30 MIN: CPT | Performed by: NURSE PRACTITIONER

## 2019-08-06 RX ORDER — FLUTICASONE PROPIONATE 50 MCG
1 SPRAY, SUSPENSION (ML) NASAL
Qty: 1 BOTTLE | Refills: 5 | Status: SHIPPED | OUTPATIENT
Start: 2019-08-06 | End: 2022-09-01 | Stop reason: SDUPTHER

## 2019-08-06 RX ORDER — FOLIC ACID 1 MG/1
1 TABLET ORAL DAILY
Qty: 90 TABLET | Refills: 1 | Status: SHIPPED | OUTPATIENT
Start: 2019-08-06 | End: 2020-03-20

## 2019-08-06 RX ORDER — AMITRIPTYLINE HYDROCHLORIDE 25 MG/1
25 TABLET, FILM COATED ORAL NIGHTLY
Qty: 90 TABLET | Refills: 1 | Status: SHIPPED | OUTPATIENT
Start: 2019-08-06 | End: 2022-02-14 | Stop reason: ALTCHOICE

## 2019-08-06 ASSESSMENT — ENCOUNTER SYMPTOMS
WHEEZING: 0
TROUBLE SWALLOWING: 0
DIARRHEA: 0
EYE DISCHARGE: 0
BACK PAIN: 0
NAUSEA: 0
VOICE CHANGE: 1
RHINORRHEA: 0
COUGH: 1
EYE PAIN: 0
VOMITING: 0
SORE THROAT: 0
SHORTNESS OF BREATH: 0
CONSTIPATION: 0
ABDOMINAL PAIN: 0

## 2019-08-22 ENCOUNTER — OFFICE VISIT (OUTPATIENT)
Dept: OBGYN CLINIC | Age: 39
End: 2019-08-22
Payer: COMMERCIAL

## 2019-08-22 VITALS — DIASTOLIC BLOOD PRESSURE: 62 MMHG | WEIGHT: 183 LBS | BODY MASS INDEX: 27.02 KG/M2 | SYSTOLIC BLOOD PRESSURE: 118 MMHG

## 2019-08-22 DIAGNOSIS — N94.10 DYSPAREUNIA, FEMALE: Primary | ICD-10-CM

## 2019-08-22 DIAGNOSIS — N90.89 VULVAR IRRITATION: ICD-10-CM

## 2019-08-22 DIAGNOSIS — L98.9 ABNORMAL SKIN OF VULVA: ICD-10-CM

## 2019-08-22 PROCEDURE — 99213 OFFICE O/P EST LOW 20 MIN: CPT | Performed by: OBSTETRICS & GYNECOLOGY

## 2019-08-22 RX ORDER — GREEN TEA/HOODIA GORDONII 315-12.5MG
1 CAPSULE ORAL DAILY
Qty: 30 TABLET | Refills: 5 | Status: SHIPPED | OUTPATIENT
Start: 2019-08-22 | End: 2020-02-13

## 2019-08-22 RX ORDER — FLUCONAZOLE 150 MG/1
150 TABLET ORAL
Qty: 2 TABLET | Refills: 0 | Status: SHIPPED | OUTPATIENT
Start: 2019-08-22 | End: 2020-01-30 | Stop reason: ALTCHOICE

## 2019-08-22 RX ORDER — NYSTATIN 100000 [USP'U]/G
POWDER TOPICAL
Qty: 1 BOTTLE | Refills: 1 | Status: SHIPPED | OUTPATIENT
Start: 2019-08-22 | End: 2020-01-30

## 2019-09-05 ENCOUNTER — HOSPITAL ENCOUNTER (OUTPATIENT)
Dept: PHYSICAL THERAPY | Facility: CLINIC | Age: 39
Setting detail: THERAPIES SERIES
Discharge: HOME | End: 2019-09-05
Payer: COMMERCIAL

## 2019-09-05 PROCEDURE — 97161 PT EVAL LOW COMPLEX 20 MIN: CPT

## 2019-09-05 PROCEDURE — G0283 ELEC STIM OTHER THAN WOUND: HCPCS

## 2019-09-05 PROCEDURE — 97110 THERAPEUTIC EXERCISES: CPT

## 2019-09-08 NOTE — CONSULTS
[] [facility]) [facility] &  Therapy  [address]  [phone]  F: [phone] [] [address]  [facility]   [address]  P: [phone]  F: [phone] [x] [address] &  Therapy  [address]  P: [phone]  F: [phone] [] [address]  [facility]   [address]  [place]: [phone]  F: [phone]     Physical Therapy General Evaluation    Date:  2019  Patient: Sandra Celestin  : 1980  MRN: [id number]  Physician: Dr Fraga How: MMO (40 vs)  Medical Diagnosis: Dyspareunia, Abnormal skin of vulva    Rehab Codes: N94.10, M62.40, M99.04  Onset Date: 3/1/2019                                  Next 's appt: 10/31/19    Subjective:   CC:Pt is a  40 y. o. who presents with a complaint of dyspareunia and feeling \"crampy\" after intercourse. States the symptoms have been present since about March when she became sexually active again. She says the dyspareunia occurs throughout intercourse and is not just associated with penetration. Also states she does have consistent low back pain. Her new previous job she was active, but now sits most of the day. Initial testing via US showed ovarian cyst, but pt states this has resolved.   HPI: (onset date:3/1/19)     PMHx: [] Unremarkable            [] Diabetes         [] HTN                [] Pacemaker              [] MI/Heart Problems     [] Cancer           [] Arthritis           [] Asthma                         [x] refer to full medical chart  In [facility]    [] Other:                              Tests:  [] X-Ray:            [] MRI:                [x] Other:US     Medications: [x] Refer to full medical record    [] None   [] Other:  Allergies:      [] Refer to full medical record     [] None   [] Other:     Function:  Hand Meredith Crews      Physician Signature:________________________________Date:__________________  By signing above or cosigning this note, I have reviewed this plan of care and certify a need for medically necessary rehabilitation services.       *PLEASE SIGN ABOVE AND FAX BACK ALL PAGES*

## 2019-09-12 ENCOUNTER — HOSPITAL ENCOUNTER (OUTPATIENT)
Dept: PHYSICAL THERAPY | Facility: CLINIC | Age: 39
Setting detail: THERAPIES SERIES
Discharge: HOME | End: 2019-09-12
Payer: COMMERCIAL

## 2019-09-12 DIAGNOSIS — N89.8 VAGINAL ITCHING: ICD-10-CM

## 2019-09-12 DIAGNOSIS — N89.8 VAGINAL IRRITATION: ICD-10-CM

## 2019-09-12 PROCEDURE — 97110 THERAPEUTIC EXERCISES: CPT

## 2019-09-12 PROCEDURE — 97035 APP MDLTY 1+ULTRASOUND EA 15: CPT

## 2019-09-12 PROCEDURE — G0283 ELEC STIM OTHER THAN WOUND: HCPCS

## 2019-09-12 RX ORDER — CLOTRIMAZOLE AND BETAMETHASONE DIPROPIONATE 10; .64 MG/G; MG/G
CREAM TOPICAL
Qty: 45 G | Refills: 0 | Status: SHIPPED | OUTPATIENT
Start: 2019-09-12 | End: 2022-02-14

## 2019-09-19 ENCOUNTER — HOSPITAL ENCOUNTER (OUTPATIENT)
Dept: PHYSICAL THERAPY | Facility: CLINIC | Age: 39
Setting detail: THERAPIES SERIES
Discharge: HOME | End: 2019-09-19
Payer: COMMERCIAL

## 2019-09-19 PROCEDURE — 97110 THERAPEUTIC EXERCISES: CPT

## 2019-09-19 PROCEDURE — G0283 ELEC STIM OTHER THAN WOUND: HCPCS

## 2019-09-19 PROCEDURE — 97035 APP MDLTY 1+ULTRASOUND EA 15: CPT

## 2019-09-19 NOTE — FLOWSHEET NOTE
Needs review. [] Demonstrates/verbalizes HEP/Ed previously given. Plan: [x] Continue per plan of care.    [] Other:       Time In: 1100           Time Out: 1966    Electronically signed by:  Vamshi Butler PTA

## 2019-09-26 ENCOUNTER — HOSPITAL ENCOUNTER (OUTPATIENT)
Dept: PHYSICAL THERAPY | Facility: CLINIC | Age: 39
Setting detail: THERAPIES SERIES
Discharge: HOME | End: 2019-09-26
Payer: COMMERCIAL

## 2019-09-26 PROCEDURE — 97110 THERAPEUTIC EXERCISES: CPT

## 2019-09-26 PROCEDURE — 97035 APP MDLTY 1+ULTRASOUND EA 15: CPT

## 2019-09-26 PROCEDURE — G0283 ELEC STIM OTHER THAN WOUND: HCPCS

## 2019-09-26 NOTE — FLOWSHEET NOTE
[] [facility] &  Therapy  [address]  [phone]  F: [phone] [] [address]  [facility]   [address]  P: [phone]  F: [phone] [x] [facility]  Therapy  [address]  P: [phone]  F: [phone] [] [address]  [facility]   [address]  [place]: [phone]  F: [phone]     Physical Therapy Daily Treatment Note    Date:  2019  Patient Name:  Oly Omer    :  1980  MRN: [id number]  Physician: Dr David Enamorado: MMO (40 vs)  Medical Diagnosis: Dyspareunia, Abnormal skin of vulva                 Rehab Codes: N94.10, M62.40, M99.04  Onset Date: 3/1/2019                                  Next 's appt: 10/31/19  Visit# / total visits:   Cancels/No Shows: 0    Subjective:    Pain:  [x] Yes  [] No Location: PF Pain Rating: (0-10 scale) 2-3/10  Pain altered Tx:  [] No  [x] Yes  Action: HP/ESTIM & US  Comments: Pt arrives w/ mild pain and reports she has had some relief the past week that eventually increased the past 2 days.      Objective:  Modalities:   Treatment Location  Left      Right                          [id number]   PF [x]          [x]  [x] Supine    [] Prone   [] Side lying  [] Sitting         Treatment Modality   1 Hot Pack:    [x] Large   [] Medium    [x] Cervical     ___20__ min     Cold Pack   [] Large   [] Medium    [] Cervical     _____ min     Vasocompression    __° temp    ____pressure     _____ min   1 Electrical Stim:    [x] IFC     [] MFAC      [] HVPC                          Protocol:___analgesic____X__20___min                          #Channels:  [] 1        [] 2       [] Other:   1 Ultrasound: _1.5_____W/cm2 x  ___10___min              [x] 1MHz   [] 3Mhz Jodee Sinclair given.     Plan: [x] Continue per plan of care.    [] Other:       Time In: 1100           Time Out: 1200    Electronically signed by:  Roxanna Gaspar PTA

## 2019-10-03 ENCOUNTER — HOSPITAL ENCOUNTER (OUTPATIENT)
Dept: PHYSICAL THERAPY | Facility: CLINIC | Age: 39
Setting detail: THERAPIES SERIES
Discharge: HOME | End: 2019-10-03
Payer: COMMERCIAL

## 2019-10-03 PROCEDURE — 97035 APP MDLTY 1+ULTRASOUND EA 15: CPT

## 2019-10-03 PROCEDURE — 97110 THERAPEUTIC EXERCISES: CPT

## 2019-10-03 PROCEDURE — G0283 ELEC STIM OTHER THAN WOUND: HCPCS

## 2019-10-10 ENCOUNTER — HOSPITAL ENCOUNTER (OUTPATIENT)
Dept: PHYSICAL THERAPY | Facility: CLINIC | Age: 39
Setting detail: THERAPIES SERIES
Discharge: HOME | End: 2019-10-10
Payer: COMMERCIAL

## 2019-10-10 PROCEDURE — 97035 APP MDLTY 1+ULTRASOUND EA 15: CPT

## 2019-10-10 PROCEDURE — G0283 ELEC STIM OTHER THAN WOUND: HCPCS

## 2019-10-10 PROCEDURE — 97110 THERAPEUTIC EXERCISES: CPT

## 2019-10-17 ENCOUNTER — HOSPITAL ENCOUNTER (OUTPATIENT)
Dept: PHYSICAL THERAPY | Facility: CLINIC | Age: 39
Setting detail: THERAPIES SERIES
Discharge: HOME | End: 2019-10-17
Payer: COMMERCIAL

## 2019-10-17 PROCEDURE — 97032 APPL MODALITY 1+ESTIM EA 15: CPT

## 2019-10-17 PROCEDURE — 97110 THERAPEUTIC EXERCISES: CPT

## 2019-10-24 ENCOUNTER — APPOINTMENT (OUTPATIENT)
Dept: PHYSICAL THERAPY | Facility: CLINIC | Age: 39
End: 2019-10-24
Payer: COMMERCIAL

## 2019-10-31 ENCOUNTER — HOSPITAL ENCOUNTER (OUTPATIENT)
Dept: PHYSICAL THERAPY | Facility: CLINIC | Age: 39
Setting detail: THERAPIES SERIES
Discharge: HOME | End: 2019-10-31
Payer: COMMERCIAL

## 2019-10-31 DIAGNOSIS — N89.8 VAGINAL ITCHING: ICD-10-CM

## 2019-10-31 DIAGNOSIS — N90.89 VULVAR IRRITATION: ICD-10-CM

## 2019-10-31 DIAGNOSIS — N89.8 VAGINAL IRRITATION: ICD-10-CM

## 2019-10-31 DIAGNOSIS — L98.9 ABNORMAL SKIN OF VULVA: ICD-10-CM

## 2019-10-31 DIAGNOSIS — N90.0 VULVAR INTRAEPITHELIAL NEOPLASIA (VIN) GRADE 1: Primary | ICD-10-CM

## 2019-10-31 DIAGNOSIS — L90.0 LICHEN SCLEROSUS: ICD-10-CM

## 2019-10-31 PROCEDURE — 97110 THERAPEUTIC EXERCISES: CPT

## 2019-10-31 PROCEDURE — G0283 ELEC STIM OTHER THAN WOUND: HCPCS

## 2019-10-31 PROCEDURE — 97140 MANUAL THERAPY 1/> REGIONS: CPT

## 2019-11-07 ENCOUNTER — HOSPITAL ENCOUNTER (OUTPATIENT)
Dept: PHYSICAL THERAPY | Facility: CLINIC | Age: 39
Setting detail: THERAPIES SERIES
Discharge: HOME | End: 2019-11-07
Payer: COMMERCIAL

## 2019-11-07 PROCEDURE — 97140 MANUAL THERAPY 1/> REGIONS: CPT

## 2019-11-07 PROCEDURE — 97110 THERAPEUTIC EXERCISES: CPT

## 2019-11-07 PROCEDURE — G0283 ELEC STIM OTHER THAN WOUND: HCPCS

## 2019-11-14 ENCOUNTER — NURSE ONLY (OUTPATIENT)
Dept: FAMILY MEDICINE CLINIC | Age: 39
End: 2019-11-14
Payer: COMMERCIAL

## 2019-11-14 ENCOUNTER — HOSPITAL ENCOUNTER (OUTPATIENT)
Dept: PHYSICAL THERAPY | Facility: CLINIC | Age: 39
Setting detail: THERAPIES SERIES
Discharge: HOME | End: 2019-11-14
Payer: COMMERCIAL

## 2019-11-14 DIAGNOSIS — Z23 NEED FOR INFLUENZA VACCINATION: Primary | ICD-10-CM

## 2019-11-14 PROCEDURE — 97110 THERAPEUTIC EXERCISES: CPT

## 2019-11-14 PROCEDURE — 97032 APPL MODALITY 1+ESTIM EA 15: CPT

## 2019-11-14 PROCEDURE — G0283 ELEC STIM OTHER THAN WOUND: HCPCS

## 2019-11-14 PROCEDURE — 90471 IMMUNIZATION ADMIN: CPT | Performed by: NURSE PRACTITIONER

## 2019-11-14 PROCEDURE — 90686 IIV4 VACC NO PRSV 0.5 ML IM: CPT | Performed by: NURSE PRACTITIONER

## 2019-11-21 ENCOUNTER — HOSPITAL ENCOUNTER (OUTPATIENT)
Dept: PHYSICAL THERAPY | Facility: CLINIC | Age: 39
Setting detail: THERAPIES SERIES
Discharge: HOME | End: 2019-11-21
Payer: COMMERCIAL

## 2019-11-21 PROCEDURE — G0283 ELEC STIM OTHER THAN WOUND: HCPCS

## 2019-11-21 PROCEDURE — 97140 MANUAL THERAPY 1/> REGIONS: CPT

## 2019-11-21 PROCEDURE — 97110 THERAPEUTIC EXERCISES: CPT

## 2019-11-26 ENCOUNTER — OFFICE VISIT (OUTPATIENT)
Dept: FAMILY MEDICINE CLINIC | Age: 39
End: 2019-11-26
Payer: COMMERCIAL

## 2019-11-26 VITALS
BODY MASS INDEX: 28.65 KG/M2 | RESPIRATION RATE: 16 BRPM | DIASTOLIC BLOOD PRESSURE: 70 MMHG | WEIGHT: 194 LBS | TEMPERATURE: 97.8 F | HEART RATE: 84 BPM | SYSTOLIC BLOOD PRESSURE: 110 MMHG

## 2019-11-26 DIAGNOSIS — R10.32 LEFT INGUINAL PAIN: ICD-10-CM

## 2019-11-26 DIAGNOSIS — G89.29 CHRONIC MIDLINE LOW BACK PAIN WITH RIGHT-SIDED SCIATICA: Primary | ICD-10-CM

## 2019-11-26 DIAGNOSIS — M54.41 CHRONIC MIDLINE LOW BACK PAIN WITH RIGHT-SIDED SCIATICA: Primary | ICD-10-CM

## 2019-11-26 DIAGNOSIS — G89.29 CHRONIC PAIN OF BOTH HIPS: ICD-10-CM

## 2019-11-26 DIAGNOSIS — M25.552 CHRONIC PAIN OF BOTH HIPS: ICD-10-CM

## 2019-11-26 DIAGNOSIS — M25.551 CHRONIC PAIN OF BOTH HIPS: ICD-10-CM

## 2019-11-26 PROCEDURE — 99214 OFFICE O/P EST MOD 30 MIN: CPT | Performed by: NURSE PRACTITIONER

## 2019-11-26 RX ORDER — PANTOPRAZOLE SODIUM 40 MG/1
40 TABLET, DELAYED RELEASE ORAL 2 TIMES DAILY
COMMUNITY

## 2019-11-26 RX ORDER — METHYLPREDNISOLONE 4 MG/1
TABLET ORAL
Qty: 1 KIT | Refills: 0 | Status: SHIPPED | OUTPATIENT
Start: 2019-11-26 | End: 2019-12-02

## 2019-11-26 RX ORDER — NAPROXEN 500 MG/1
500 TABLET ORAL 2 TIMES DAILY WITH MEALS
Qty: 60 TABLET | Refills: 2 | Status: SHIPPED | OUTPATIENT
Start: 2019-11-26 | End: 2020-02-27

## 2019-11-26 ASSESSMENT — ENCOUNTER SYMPTOMS
RHINORRHEA: 0
BACK PAIN: 1
VOMITING: 0
DIARRHEA: 0
CONSTIPATION: 0
ABDOMINAL PAIN: 0
SHORTNESS OF BREATH: 0
SORE THROAT: 0
COUGH: 0

## 2019-11-27 DIAGNOSIS — G89.29 CHRONIC PAIN OF BOTH HIPS: ICD-10-CM

## 2019-11-27 DIAGNOSIS — M54.41 CHRONIC MIDLINE LOW BACK PAIN WITH RIGHT-SIDED SCIATICA: ICD-10-CM

## 2019-11-27 DIAGNOSIS — G89.29 CHRONIC MIDLINE LOW BACK PAIN WITH RIGHT-SIDED SCIATICA: ICD-10-CM

## 2019-11-27 DIAGNOSIS — M25.552 CHRONIC PAIN OF BOTH HIPS: ICD-10-CM

## 2019-11-27 DIAGNOSIS — M25.551 CHRONIC PAIN OF BOTH HIPS: ICD-10-CM

## 2019-12-02 DIAGNOSIS — G89.29 CHRONIC PAIN OF BOTH HIPS: Primary | ICD-10-CM

## 2019-12-02 DIAGNOSIS — M25.552 CHRONIC PAIN OF BOTH HIPS: Primary | ICD-10-CM

## 2019-12-02 DIAGNOSIS — M25.551 CHRONIC PAIN OF BOTH HIPS: Primary | ICD-10-CM

## 2019-12-05 ENCOUNTER — HOSPITAL ENCOUNTER (OUTPATIENT)
Dept: PHYSICAL THERAPY | Facility: CLINIC | Age: 39
Setting detail: THERAPIES SERIES
Discharge: HOME | End: 2019-12-05
Payer: COMMERCIAL

## 2019-12-05 PROCEDURE — 97140 MANUAL THERAPY 1/> REGIONS: CPT

## 2019-12-05 PROCEDURE — G0283 ELEC STIM OTHER THAN WOUND: HCPCS

## 2019-12-09 DIAGNOSIS — M25.551 BILATERAL HIP PAIN: Primary | ICD-10-CM

## 2019-12-09 DIAGNOSIS — M25.552 BILATERAL HIP PAIN: Primary | ICD-10-CM

## 2019-12-12 ENCOUNTER — HOSPITAL ENCOUNTER (OUTPATIENT)
Dept: PHYSICAL THERAPY | Facility: CLINIC | Age: 39
Setting detail: THERAPIES SERIES
Discharge: HOME | End: 2019-12-12
Payer: COMMERCIAL

## 2019-12-12 PROCEDURE — 97035 APP MDLTY 1+ULTRASOUND EA 15: CPT

## 2019-12-12 PROCEDURE — 97110 THERAPEUTIC EXERCISES: CPT

## 2019-12-16 ENCOUNTER — OFFICE VISIT (OUTPATIENT)
Dept: ORTHOPEDIC SURGERY | Age: 39
End: 2019-12-16
Payer: COMMERCIAL

## 2019-12-16 VITALS
HEART RATE: 99 BPM | BODY MASS INDEX: 28.88 KG/M2 | WEIGHT: 195 LBS | HEIGHT: 69 IN | DIASTOLIC BLOOD PRESSURE: 79 MMHG | SYSTOLIC BLOOD PRESSURE: 117 MMHG

## 2019-12-16 DIAGNOSIS — M25.851 FEMOROACETABULAR IMPINGEMENT OF BOTH HIPS: ICD-10-CM

## 2019-12-16 DIAGNOSIS — M70.60 GREATER TROCHANTERIC BURSITIS, UNSPECIFIED LATERALITY: ICD-10-CM

## 2019-12-16 DIAGNOSIS — M16.10 HIP ARTHRITIS: Primary | ICD-10-CM

## 2019-12-16 DIAGNOSIS — M46.1 SACROILIITIS, NOT ELSEWHERE CLASSIFIED (HCC): ICD-10-CM

## 2019-12-16 DIAGNOSIS — M25.852 FEMOROACETABULAR IMPINGEMENT OF BOTH HIPS: ICD-10-CM

## 2019-12-16 PROCEDURE — 99203 OFFICE O/P NEW LOW 30 MIN: CPT | Performed by: ORTHOPAEDIC SURGERY

## 2019-12-30 ENCOUNTER — TELEPHONE (OUTPATIENT)
Dept: OBGYN CLINIC | Age: 39
End: 2019-12-30

## 2020-01-09 ENCOUNTER — HOSPITAL ENCOUNTER (OUTPATIENT)
Dept: PHYSICAL THERAPY | Facility: CLINIC | Age: 40
Setting detail: THERAPIES SERIES
Discharge: HOME | End: 2020-01-09
Payer: COMMERCIAL

## 2020-01-09 ENCOUNTER — OFFICE VISIT (OUTPATIENT)
Dept: ORTHOPEDIC SURGERY | Age: 40
End: 2020-01-09
Payer: COMMERCIAL

## 2020-01-09 VITALS
DIASTOLIC BLOOD PRESSURE: 74 MMHG | SYSTOLIC BLOOD PRESSURE: 123 MMHG | HEART RATE: 97 BPM | HEIGHT: 69 IN | BODY MASS INDEX: 28.88 KG/M2 | WEIGHT: 195 LBS

## 2020-01-09 PROCEDURE — 99214 OFFICE O/P EST MOD 30 MIN: CPT | Performed by: ORTHOPAEDIC SURGERY

## 2020-01-09 PROCEDURE — 97035 APP MDLTY 1+ULTRASOUND EA 15: CPT

## 2020-01-09 PROCEDURE — G0283 ELEC STIM OTHER THAN WOUND: HCPCS

## 2020-01-09 PROCEDURE — 97110 THERAPEUTIC EXERCISES: CPT

## 2020-01-09 ASSESSMENT — ENCOUNTER SYMPTOMS
CONSTIPATION: 0
COLOR CHANGE: 0
CHEST TIGHTNESS: 0
NAUSEA: 0
DIARRHEA: 0
APNEA: 0
SHORTNESS OF BREATH: 0
ABDOMINAL DISTENTION: 0
COUGH: 0
VOMITING: 0
ABDOMINAL PAIN: 0

## 2020-01-09 NOTE — PROGRESS NOTES
[facility]  [address] [facility] [address]  Dept: [phone]  Dept Fax: [phone]        Bilateral Hip Office Visit    Subjective:     Chief Complaint   Patient presents with    Hip Pain     b/l hip pain, right worse than left; going on for years, not getting better     HPI:    Lalo Mc presents with a 2 year history of pain in the bilateral hip where the right hip is worse than the left. Occupation: . The pain does not radiate into the thigh nor into the groin but it is moreover the lateral aspect of the hip. The pain is most troublesome during ambulatory activity and now limits the patient`s walking distance to shorter distances. Night pain, which disturbs the patient`s sleep is a problem. Patient states that the right hip feels like it pops out of place and she states that she has to move the leg around and she feels like it pops back in but it does not relieve the pain. The patient has had to give up some activities such as sitting for a long time, walking for a long period of time and exercising, which has produced a consequent deterioration in quality of life. She has tried Naproxen, home exercises, a heating pad and reduction of activity and reports no improvement. Back pain is not present and does not interfere with the patient`s life as does the hip. The patient has not had a cortisone injection. The patient has tried physical therapy for her pelvic floor and she feels that it aggravated her hips. The patient has not had surgery. Knee pain is not noted. Review of Systems   Constitutional: Positive for activity change. Negative for appetite change, fatigue and fever. Respiratory: Negative for apnea, cough, chest tightness and shortness of breath. Cardiovascular: Negative for chest pain, palpitations and leg swelling.    Gastrointestinal: Negative for abdominal distention, abdominal that she understands the plan and at this time, she has opted for a physical therapy script where they may do core strengthening and stretching while treating her JOHN. A physical therapy prescription was given. Patient was also told to continue taking NSAID's at a prescription dose to help with her inflammation in the hip. I also told her that I do not feel she needs to follow up with a rheumatologist because I feel her problem may be an anatomical issue and not rheumatoid arthritis issue. Patient was also informed that she is not doing any damage. But she should ask her mother what age she had her MIRIAM. Patient should return to the clinic in 6 weeks to follow up with Esthela HERNANDEZ and if she is not better then we will consider ordering the MRI arthrogram of the right hip. The patient will call the office immediately with any problems. No orders of the defined types were placed in this encounter. Orders Placed This Encounter   Procedures    [facility]  [place]     Referral Priority:   Routine     Referral Type:   Eval and Treat     Referral Reason:   Specialty Services Required     Requested Specialty:   Physical Therapy     Number of Visits Requested:   1     Jimbo CHRISTIE am scribing for and in the presence of Esthela Graves D. O.. 1/12/2020  3:59 PM      Esthela CHRISTIE DO, have personally seen this patient and I have reviewed the CC, PMH, FHX and Social History as provided by other clinical staff. I reassessed the HPI and ROS as scribed by Lillian Lee in my presence and it is both accurate and complete. Thereafter, I personally performed the PE, reviewed the imaging and established the DX and POC. I agree with the documentation provided by the Medical Scribe. I have reviewed all documentation in its entirety prior to providing my signature indicating agreement. Any areas of disagreement are noted on the chart.     Electronically signed by Jay Strange DO on 1/12/2020 at 3:59 PM        Electronically signed by Jenni Knight DO on 1/12/2020 at 3:59 PM

## 2020-01-16 ENCOUNTER — HOSPITAL ENCOUNTER (OUTPATIENT)
Dept: PHYSICAL THERAPY | Facility: CLINIC | Age: 40
Setting detail: THERAPIES SERIES
Discharge: HOME | End: 2020-01-16
Payer: COMMERCIAL

## 2020-01-16 PROCEDURE — G0283 ELEC STIM OTHER THAN WOUND: HCPCS

## 2020-01-16 PROCEDURE — 97035 APP MDLTY 1+ULTRASOUND EA 15: CPT

## 2020-01-16 PROCEDURE — 97110 THERAPEUTIC EXERCISES: CPT

## 2020-01-23 ENCOUNTER — HOSPITAL ENCOUNTER (OUTPATIENT)
Dept: PHYSICAL THERAPY | Facility: CLINIC | Age: 40
Setting detail: THERAPIES SERIES
Discharge: HOME | End: 2020-01-23
Payer: COMMERCIAL

## 2020-01-23 PROCEDURE — G0283 ELEC STIM OTHER THAN WOUND: HCPCS

## 2020-01-23 PROCEDURE — 97140 MANUAL THERAPY 1/> REGIONS: CPT

## 2020-01-23 PROCEDURE — 97035 APP MDLTY 1+ULTRASOUND EA 15: CPT

## 2020-01-23 NOTE — FLOWSHEET NOTE
[] 20%                  Add: [] Lidex   [] Stim    [] Gel pad        Massage:    [] Soft Tissue   [] Cross Friction                      [] Ice ____min   3 Other:  Stretches, relaxation ex, PF/core ex      Exercises:  Exercise Reps/ Time Weight/ Level Comments    MET 10 10 sec    x   Supine PRC ex 20        Piriformis stretches 3 30 sec    x   Hip flexor stretches 10 10 sec    x   Diaphragmatic breathing       Bridges on SB 2x15    x   Clamshells: Supine & Sidelying 2x15 Plum  x   Prone hip extension 2x15      Nordic curl, reverse nordic curl 3x30''      Side stepping 3L Blue     Other:  Manual: DI to L piriformis, distal & proximal L hip flexor, L glut max/med, L DL  Grades I/II joint mobilizations to L hip in supine - hooklying      Specific Instructions for next treatment: Recheck pelvic alignment       Treatment Charges: Mins Units   [x]  Modalities:   IFC/HP  US   20/0  10   1/0  1   []  Ther Exercise 5 --   [x]  Manual Therapy 25 2   []  Ther Activities     []  Aquatics     []  Vasocompression     []  Other     Total Treatment time 60 4       Assessment: [x] Progressing toward goals. [] No change. [x] Other: Pt presents w/ R innominate rotation this date - MET to correct. Cont w/ HP/ES followed by US to PF. Manual direct inhibition per log. Decr tension noted. No newly onset symptoms reported after treatment, decr pain reported. Pt reports compliance w/ HEP. STG: (to be met in 6 treatments)  1. Normal pelvic alignment  2. ? Strength:improve core strength  3. ? Function:Able to have intercouse with less then 2/10 pain  4. Independent with Home Exercise Programs  LTG: (to be met in 12 treatments)  1. 0/10 pain with intercourse at penetration and deep  2. Able to perform all advanced ADL's without difficulty  3. PF strength grossly 4/5      Patient goals: Pelvic pain to go away or less especially when I have intercourse    Pt.  Education:  [x] Yes  [] No  [] Reviewed Prior HEP/Ed  Method of Education: [x] Verbal  [x] Demo  [] Written  Comprehension of Education:  [x] Verbalizes understanding. [x] Demonstrates understanding. [] Needs review. [] Demonstrates/verbalizes HEP/Ed previously given. Plan: [x] Continue per plan of care.    [] Other:       Time In: 1200         Time Out: 1310    Electronically signed by:  Barbara Mota PTA

## 2020-01-30 ENCOUNTER — OFFICE VISIT (OUTPATIENT)
Dept: FAMILY MEDICINE CLINIC | Age: 40
End: 2020-01-30
Payer: COMMERCIAL

## 2020-01-30 ENCOUNTER — HOSPITAL ENCOUNTER (OUTPATIENT)
Dept: PHYSICAL THERAPY | Facility: CLINIC | Age: 40
Setting detail: THERAPIES SERIES
Discharge: HOME | End: 2020-01-30
Payer: COMMERCIAL

## 2020-01-30 VITALS
DIASTOLIC BLOOD PRESSURE: 72 MMHG | OXYGEN SATURATION: 99 % | RESPIRATION RATE: 18 BRPM | WEIGHT: 199.2 LBS | HEART RATE: 102 BPM | TEMPERATURE: 99.2 F | BODY MASS INDEX: 29.42 KG/M2 | SYSTOLIC BLOOD PRESSURE: 112 MMHG

## 2020-01-30 PROBLEM — F32.A DEPRESSION: Status: ACTIVE | Noted: 2020-01-30

## 2020-01-30 PROBLEM — R53.83 OTHER FATIGUE: Status: ACTIVE | Noted: 2020-01-30

## 2020-01-30 PROCEDURE — G0283 ELEC STIM OTHER THAN WOUND: HCPCS

## 2020-01-30 PROCEDURE — 97110 THERAPEUTIC EXERCISES: CPT

## 2020-01-30 PROCEDURE — 99214 OFFICE O/P EST MOD 30 MIN: CPT | Performed by: NURSE PRACTITIONER

## 2020-01-30 PROCEDURE — 97140 MANUAL THERAPY 1/> REGIONS: CPT

## 2020-01-30 PROCEDURE — 97035 APP MDLTY 1+ULTRASOUND EA 15: CPT

## 2020-01-30 RX ORDER — CYCLOBENZAPRINE HCL 10 MG
10 TABLET ORAL PRN
COMMUNITY
Start: 2018-04-10 | End: 2022-02-14

## 2020-01-30 RX ORDER — GUAIFENESIN 600 MG/1
600 TABLET, EXTENDED RELEASE ORAL EVERY 12 HOURS PRN
COMMUNITY

## 2020-01-30 RX ORDER — CLOBETASOL PROPIONATE 0.5 MG/G
OINTMENT TOPICAL PRN
COMMUNITY
Start: 2017-12-26 | End: 2022-02-14 | Stop reason: ALTCHOICE

## 2020-01-30 ASSESSMENT — ENCOUNTER SYMPTOMS
SORE THROAT: 0
SINUS PRESSURE: 0
CONSTIPATION: 0
SINUS PAIN: 0
ABDOMINAL PAIN: 0
SHORTNESS OF BREATH: 0
CHEST TIGHTNESS: 0
NAUSEA: 0
DIARRHEA: 0
COUGH: 0

## 2020-01-30 NOTE — FLOWSHEET NOTE
[] [facility]) [facility]  Therapy  [address]  [phone]  F: [phone] [] [address]  Kl\[facility] [place] [facility]\"" [address]   [address]  P: [phone]  F: [phone] [x] [address]  Therapy  [address]  P: [phone]  F: [phone] [] [address]  [facility]   [address]  [place]: [phone]  F: [phone]     Physical Therapy Daily Treatment Note    Date:  2020  Patient Name:  Christine Prasad    :  1980  MRN: [id number]  Physician: Dr Jairon Ann: MMO (40 vs)  Medical Diagnosis: Dyspareunia, Abnormal skin of vulva                 Rehab Codes: N94.10, M62.40, M99.04  Onset Date: 3/1/2019                                  Next 's appt: 10/31/19  Visit# / total visits:   Cancels/No Shows: 0    Subjective:    Pain:  [x] Yes  [] No Location: R hip, R SIJ/LB Pain Rating: (0-10 scale) 3/10  Pain altered Tx:  [] No  [x] Yes  Action: HP/ESTIM & US  Comments: Pt arrives feeling \"out of place\" referring to her SIJ. Also states she is arriving post dr villaseñor and is being tested for cushing's syndrome.      Objective:  Modalities:   Treatment Location  Left      Right                          ADHERACQ   PF [x]          [x]  [x] Supine    [] Prone   [] Side lying  [] Sitting         Treatment Modality   1 Hot Pack:    [x] Large   [] Medium    [x] Cervical     ___20__ min     Cold Pack   [] Large   [] Medium    [] Cervical     _____ min     Vasocompression    __° temp    ____pressure     _____ min   1 Electrical Stim:    [x] IFC     [] MFAC      [] HVPC                          Protocol:___analgesic____X__20___min                          #Channels:  [] 1        [] 2       [] Other:   1 Ultrasound: _1.5_____W/cm2 x  ___10___min              [x] 1MHz   [] 3Mhz                      Duty Factor: [x] 100%  [] 50%   [] 20%                  Add: [] Lidex   [] Stim    [] Gel pad        Massage:    [] Soft Tissue   [] Cross Friction                      [] Ice ____min   3 Other:  Stretches, relaxation ex, PF/core ex      Exercises:  Exercise Reps/ Time Weight/ Level Comments    MET 10 10 sec    x   Supine PRC ex 20        Piriformis stretches 3 30 sec    x   Hip flexor stretches 10 10 sec    x   Diaphragmatic breathing       Bridges on SB 2x15    x   Clamshells: Supine & Sidelying 2x15 Plum  x   Prone hip extension 2x15      Nordic curl, reverse nordic curl 3x30''      Side stepping 3L Blue     Other:  Manual: DI to L piriformis, distal & proximal L hip flexor, L glut max/med, L DL  Grades I/II joint mobilizations to L hip in supine - hooklying      Specific Instructions for next treatment: Recheck pelvic alignment, flying squirrels      Treatment Charges: Mins Units   [x]  Modalities:   IFC/HP  US   20/0  10   1/0  1   [x]  Ther Exercise 15 1   [x]  Manual Therapy 10 1   []  Ther Activities     []  Aquatics     []  Vasocompression     []  Other     Total Treatment time 55 4       Assessment: [x] Progressing toward goals. [] No change. [x] Other: Pt presents w/ R innominate rotation this date - MET to correct. Cont w/ HP/ES followed by US to PF. Manual direct inhibition per log. Decr tension noted. Bridges on SB and clamshells to follow w/ decrease pain and discomfort report post treatment. Pt reports compliance w/ HEP. STG: (to be met in 6 treatments)  1. Normal pelvic alignment  2. ? Strength:improve core strength  3. ? Function:Able to have intercouse with less then 2/10 pain  4. Independent with Home Exercise Programs  LTG: (to be met in 12 treatments)  1. 0/10 pain with intercourse at penetration and deep  2. Able to perform all advanced ADL's without difficulty  3.  PF strength grossly 4/5      Patient goals: Pelvic pain to go away or less especially when I have intercourse    Pt. Education:  [x] Yes  [] No  [] Reviewed Prior HEP/Ed  Method of Education: [x] Verbal  [x] Demo  [] Written  Comprehension of Education:  [x] Verbalizes understanding. [x] Demonstrates understanding. [] Needs review. [] Demonstrates/verbalizes HEP/Ed previously given. Plan: [x] Continue per plan of care.    [] Other:       Time In: 1700         Time Out: 1800    Electronically signed by:  Mendez Carrillo PTA

## 2020-01-30 NOTE — PROGRESS NOTES
History of Present Illness:     Wai Watkins is a 44 y.o. female who presents in office today with Self cushing syndrome. Symptoms of round face/redness, weight gain in abdomen, extreme fatigue, depression, headaches/migraines, acne, muscle weakness in hips/legs. Noticed in the last few years. Doctor Jessica Marie, patient's uncle, suggested she get tested for cushing syndrome. No family history of cushing's. She is in Physical Therapy for her hips. She follows with Orthopedic Surgeon - Dr Roderick Hudson. She has some limited range of motion and intermittent radiation down the legs. She has been tested for sleep apnea, but was low on severity and did not tolerate CPAP mask. More migraines lately, follows with Neurology. Discussed starting Botox. Follows at [facility]. Had thyroid labs drawn in December which were normal.     HPI    Patient Care Team:  ROSELINE Izquierdo CNP as PCP - General (Family Medicine)  ROSELINE Izquierdo CNP as PCP - [facility] Empaneled Provider  Doug Ruiz MD as Consulting Physician (Gastroenterology)    Visit Information    Have you changed or started any medications since your last visit including any over-the-counter medicines, vitamins, or herbal medicines? yes - Med list udpated   Are you having any side effects from any of your medications? -  yes - thirst  Have you stopped taking any of your medications? Is so, why? -  yes - Med list udpated  Have you seen any other physician or provider since your last visit? Yes - Records Obtained Ortho, PT, Neurologist, OBGYN [facility]   Have you had any other diagnostic tests since your last visit? Yes - Records Obtained  Have you been seen in the emergency room and/or had an admission to a hospital since we last saw you? No  Have you had your routine dental cleaning in the past 6 months? No  Have you activated your [employer] account? Yes  If activated, Do you have the mobile donn and comfortable using functions?  No    Reviewed [x] Past Medical, Family, and Social History was reviewed per writer and does contribute to the patient presenting condition. [x] Laboratory Results, Vital signs, Imaging, Active Problems, Immunizations, Current/Recently Discontinued Medications, Health Maintenance Activities Due, Referral Notes (if available) were reviewed per writer     [x] Reviewed Depression screening if taken or valid today or any other valid screening tool (others seen below) Interpretation of Total Score DepressionSeverity: 1-4 = Minimal depression, 5-9 = Mild depression, 10-14 = Moderate depression, 15-19 = Moderately severe depression, 20-27 =Severe depression    PHQ Scores 1/17/2018   PHQ2 Score 2   PHQ9 Score 2     Interpretation of Total Score Depression Severity: 1-4 = Minimal depression, 5-9 = Mild depression, 10-14 = Moderate depression, 15-19 = Moderately severe depression, 20-27 = Severe depression     Review of Systems (Subjective)     Review of Systems   Constitutional: Negative for activity change, appetite change, fever and unexpected weight change. HENT: Negative for congestion, sinus pressure, sinus pain and sore throat. Respiratory: Negative for cough, chest tightness and shortness of breath. Cardiovascular: Negative for chest pain and palpitations. Gastrointestinal: Negative for abdominal pain, constipation, diarrhea and nausea. Endocrine: Positive for polydipsia (very thirsty lately) and polyuria. Genitourinary: Negative for difficulty urinating and dysuria. Musculoskeletal: Positive for arthralgias (bilateral hip pain, unchanged). Skin: Negative for rash and wound. Neurological: Positive for headaches (4 migraines a month). Negative for light-headedness and numbness. Hematological: Does not bruise/bleed easily. Psychiatric/Behavioral: Positive for dysphoric mood (improved mood) and sleep disturbance (poor sleep, ongoing). The patient is not nervous/anxious.       Physical Assessment La Nena Knight in [facility]  [email or web address]   Office: [phone]   Cell: [phone]

## 2020-02-03 ENCOUNTER — HOSPITAL ENCOUNTER (OUTPATIENT)
Age: 40
Setting detail: SPECIMEN
Discharge: HOME | End: 2020-02-03
Payer: COMMERCIAL

## 2020-02-05 LAB — CORTISOL SALIVARY: 0.02 UG/DL

## 2020-02-06 ENCOUNTER — HOSPITAL ENCOUNTER (OUTPATIENT)
Age: 40
Setting detail: SPECIMEN
Discharge: HOME | End: 2020-02-06
Payer: COMMERCIAL

## 2020-02-06 ENCOUNTER — HOSPITAL ENCOUNTER (OUTPATIENT)
Dept: PHYSICAL THERAPY | Facility: CLINIC | Age: 40
Setting detail: THERAPIES SERIES
Discharge: HOME | End: 2020-02-06
Payer: COMMERCIAL

## 2020-02-06 LAB
ABSOLUTE EOS #: 0.59 K/UL (ref 0–0.44)
ABSOLUTE IMMATURE GRANULOCYTE: <0.03 K/UL (ref 0–0.3)
ABSOLUTE LYMPH #: 2.52 K/UL (ref 1.1–3.7)
ABSOLUTE MONO #: 0.61 K/UL (ref 0.1–1.2)
ALBUMIN SERPL-MCNC: 4.2 G/DL (ref 3.5–5.2)
ALBUMIN/GLOBULIN RATIO: 1.4 (ref 1–2.5)
ALP BLD-CCNC: 73 U/L (ref 35–104)
ALT SERPL-CCNC: 20 U/L (ref 5–33)
ANION GAP SERPL CALCULATED.3IONS-SCNC: 14 MMOL/L (ref 9–17)
AST SERPL-CCNC: 14 U/L
BASOPHILS # BLD: 2 % (ref 0–2)
BASOPHILS ABSOLUTE: 0.12 K/UL (ref 0–0.2)
BILIRUB SERPL-MCNC: 0.28 MG/DL (ref 0.3–1.2)
BUN BLDV-MCNC: 16 MG/DL (ref 6–20)
BUN/CREAT BLD: ABNORMAL (ref 9–20)
C-REACTIVE PROTEIN: 2.8 MG/L (ref 0–5)
CALCIUM SERPL-MCNC: 9.3 MG/DL (ref 8.6–10.4)
CHLORIDE BLD-SCNC: 106 MMOL/L (ref 98–107)
CHOLESTEROL, FASTING: 152 MG/DL
CHOLESTEROL/HDL RATIO: 3.1
CO2: 20 MMOL/L (ref 20–31)
CREAT SERPL-MCNC: 0.69 MG/DL (ref 0.5–0.9)
DIFFERENTIAL TYPE: ABNORMAL
EOSINOPHILS RELATIVE PERCENT: 8 % (ref 1–4)
GFR AFRICAN AMERICAN: >60 ML/MIN
GFR NON-AFRICAN AMERICAN: >60 ML/MIN
GFR SERPL CREATININE-BSD FRML MDRD: ABNORMAL ML/MIN/{1.73_M2}
GFR SERPL CREATININE-BSD FRML MDRD: ABNORMAL ML/MIN/{1.73_M2}
GLUCOSE FASTING: 83 MG/DL (ref 70–99)
HCT VFR BLD CALC: 40.6 % (ref 36.3–47.1)
HDLC SERPL-MCNC: 49 MG/DL
HEMOGLOBIN: 12.6 G/DL (ref 11.9–15.1)
IMMATURE GRANULOCYTES: 0 %
LDL CHOLESTEROL: 86 MG/DL (ref 0–130)
LYMPHOCYTES # BLD: 32 % (ref 24–43)
MCH RBC QN AUTO: 29.7 PG (ref 25.2–33.5)
MCHC RBC AUTO-ENTMCNC: 31 G/DL (ref 28.4–34.8)
MCV RBC AUTO: 95.8 FL (ref 82.6–102.9)
MONOCYTES # BLD: 8 % (ref 3–12)
NRBC AUTOMATED: 0 PER 100 WBC
PDW BLD-RTO: 13.2 % (ref 11.8–14.4)
PLATELET # BLD: 416 K/UL (ref 138–453)
PLATELET ESTIMATE: ABNORMAL
PMV BLD AUTO: 9.5 FL (ref 8.1–13.5)
POTASSIUM SERPL-SCNC: 4 MMOL/L (ref 3.7–5.3)
RBC # BLD: 4.24 M/UL (ref 3.95–5.11)
RBC # BLD: ABNORMAL 10*6/UL
SEDIMENTATION RATE, ERYTHROCYTE: 13 MM (ref 0–20)
SEG NEUTROPHILS: 50 % (ref 36–65)
SEGMENTED NEUTROPHILS ABSOLUTE COUNT: 4.04 K/UL (ref 1.5–8.1)
SODIUM BLD-SCNC: 140 MMOL/L (ref 135–144)
TOTAL PROTEIN: 7.3 G/DL (ref 6.4–8.3)
TRIGLYCERIDE, FASTING: 87 MG/DL
TSH SERPL DL<=0.05 MIU/L-ACNC: 1.36 MIU/L (ref 0.3–5)
VITAMIN B-12: 407 PG/ML (ref 232–1245)
VITAMIN D 25-HYDROXY: 15.1 NG/ML (ref 30–100)
VLDLC SERPL CALC-MCNC: NORMAL MG/DL (ref 1–30)
WBC # BLD: 7.9 K/UL (ref 3.5–11.3)
WBC # BLD: ABNORMAL 10*3/UL

## 2020-02-06 PROCEDURE — 97035 APP MDLTY 1+ULTRASOUND EA 15: CPT

## 2020-02-06 PROCEDURE — 97140 MANUAL THERAPY 1/> REGIONS: CPT

## 2020-02-06 PROCEDURE — 97110 THERAPEUTIC EXERCISES: CPT

## 2020-02-06 PROCEDURE — G0283 ELEC STIM OTHER THAN WOUND: HCPCS

## 2020-02-06 NOTE — FLOWSHEET NOTE
[] [address]  [address]  [phone]  F: [phone] [] [address] [facility] [address]   [address]  P: [phone]  F: [phone] [x] [address]  Therapy  [address] [facility]  P: [phone]  F: [phone] [] [address]  [facility]   [address]  [place]: [phone]  F: [phone]     Physical Therapy Daily Treatment Note    Date:  2020  Patient Name:  Katharina Schreiber    :  1980  MRN: [id number]  Physician: Dr Raad Macias: MMO (40 vs)  Medical Diagnosis: Dyspareunia, Abnormal skin of vulva                 Rehab Codes: N94.10, M62.40, M99.04  Onset Date: 3/1/2019                                  Next 's appt: 10/31/19  Visit# / total visits:   Cancels/No Shows: 0    Subjective:    Pain:  [x] Yes  [] No Location: R hip, R SIJ/LB Pain Rating: (0-10 scale) 4-5/10  Pain altered Tx:  [] No  [x] Yes  Action: HP/ESTIM & US  Comments: Pt arrives feeling painful - states her left side has been bothering her more. States her R groin area is always sore.      Objective:  Modalities:   Treatment Location  Left      Right                          YYRNYVVN   PF [x]          [x]  [x] Supine    [] Prone   [] Side lying  [] Sitting         Treatment Modality   1 Hot Pack:    [x] Large   [] Medium    [x] Cervical     ___20__ min     Cold Pack   [] Large   [] Medium    [] Cervical     _____ min     Vasocompression    __° temp    ____pressure     _____ min   1 Electrical Stim:    [x] IFC     [] MFAC      [] HVPC                          Protocol:___analgesic____X__20___min                          #Channels:  [] 1        [] 2       [] Other:   1 Ultrasound: _1.5_____W/cm2 x  ___10___min              [x] 1MHz   [] 3Mhz                      Duty Factor: [x] 100%  [] 50%   [] 20%                  Add: [] Lidex   [] Stim    [] Gel pad        Massage:    [] Soft Tissue   [] Cross Friction                      [] Ice ____min   3 Other:  Stretches, relaxation ex, PF/core ex      Exercises:  Exercise Reps/ Time Weight/ Level Comments    MET 10 10 sec    x   Supine PRC ex 20        Piriformis stretches 3 30 sec    x   Hip flexor stretches 10 10 sec    x   Diaphragmatic breathing       Bridges on SB 2x15    x   Clamshells: Supine & Sidelying 2x15 Lime  x   Prone hip extension 2x15      Nordic curl, reverse nordic curl 3x30''      Flying squirrels 2x15  2 pillows at hips x   Side stepping 3L Blue     Other:  Manual: DI to L piriformis, distal & proximal L hip flexor, L glut max/med, L DL  Grades I/II joint mobilizations to L hip in supine - hooklying      Specific Instructions for next treatment: Recheck pelvic alignment      Treatment Charges: Mins Units   [x]  Modalities:   IFC/HP  US   20/0  10   1/0  1   [x]  Ther Exercise 15 1   [x]  Manual Therapy 10 1   []  Ther Activities     []  Aquatics     []  Vasocompression     []  Other     Total Treatment time 55 4       Assessment: [x] Progressing toward goals. [] No change. [x] Other: Pt presents w/ L innominate rotation this date - MET to correct. Cont w/ HP/ES followed by US to PF. Extended time taken during for direct inhibition per log d/t incr tension this date. Decr pain and tension noted per patient at end of treatment. STG: (to be met in 6 treatments)  1. Normal pelvic alignment  2. ? Strength:improve core strength  3. ? Function:Able to have intercouse with less then 2/10 pain  4. Independent with Home Exercise Programs  LTG: (to be met in 12 treatments)  1. 0/10 pain with intercourse at penetration and deep  2. Able to perform all advanced ADL's without difficulty  3.  PF strength grossly 4/5      Patient goals: Pelvic pain to go away or less especially when I have intercourse    Pt. Education:  [x] Yes  [] No  [] Reviewed Prior HEP/Ed  Method of Education: [x] Verbal  [x] Demo  [] Written  Comprehension of Education:  [x] Verbalizes understanding. [x] Demonstrates understanding. [] Needs review. [] Demonstrates/verbalizes HEP/Ed previously given. Plan: [x] Continue per plan of care.    [] Other:       Time In: 1100        Time Out: 1200    Electronically signed by:  Philip Brandt PTA

## 2020-02-07 LAB
ANTI-NUCLEAR ANTIBODY (ANA): NEGATIVE
CORTISOL (UR), FREE: 3.1 UG/D
CORTISOL URINE, FREE (/G CRT): 1.94 UG/L
CORTISOL, URINE RATIO CREATININE: 5.24 UG/G CRT
CORTISOL, URINE: ABNORMAL
CREATININE URINE /24 HR: 589 MG/D (ref 700–1600)
CREATININE URINE /VOLUME: 37 MG/DL
GROWTH HORMONE: 0.28 NG/ML (ref 0.03–10)
HGH COLLECTION INFO: NORMAL
HOURS COLLECTED: 24
URINE VOLUME: 1591

## 2020-02-10 ENCOUNTER — TELEPHONE (OUTPATIENT)
Dept: FAMILY MEDICINE CLINIC | Age: 40
End: 2020-02-10

## 2020-02-10 RX ORDER — ERGOCALCIFEROL 1.25 MG/1
50000 CAPSULE ORAL WEEKLY
Qty: 12 CAPSULE | Refills: 1 | Status: SHIPPED | OUTPATIENT
Start: 2020-02-10 | End: 2020-07-06

## 2020-02-13 ENCOUNTER — HOSPITAL ENCOUNTER (OUTPATIENT)
Dept: PHYSICAL THERAPY | Facility: CLINIC | Age: 40
Setting detail: THERAPIES SERIES
Discharge: HOME | End: 2020-02-13
Payer: COMMERCIAL

## 2020-02-13 PROCEDURE — 97110 THERAPEUTIC EXERCISES: CPT

## 2020-02-13 PROCEDURE — G0283 ELEC STIM OTHER THAN WOUND: HCPCS

## 2020-02-13 PROCEDURE — 97035 APP MDLTY 1+ULTRASOUND EA 15: CPT

## 2020-02-13 RX ORDER — L. ACIDOPHILUS/LACTOBAC SPOR 35MM-25MM
TABLET ORAL
Qty: 30 TABLET | Refills: 5 | Status: SHIPPED
Start: 2020-02-13 | End: 2020-03-17 | Stop reason: SDUPTHER

## 2020-02-13 NOTE — FLOWSHEET NOTE
[x] 1MHz   [] 3Mhz                      Duty Factor: [x] 100%  [] 50%   [] 20%                  Add: [] Lidex   [] Stim    [] Gel pad        Massage:    [] Soft Tissue   [] Cross Friction                      [] Ice ____min   3 Other:  Stretches, relaxation ex, PF/core ex      Exercises:  Exercise Reps/ Time Weight/ Level Comments    MET 10 10 sec    x   Supine PRC ex 20        Piriformis stretches 3 30 sec    x   Hip flexor stretches 10 10 sec    x   Diaphragmatic breathing       Bridges on SB 2x15    x   Clamshells: Supine & Sidelying 2x15 Lime  x   Prone hip extension 2x15      Nordic curl, reverse nordic curl 3x30''      Flying squirrels 2x15  2 pillows at hips x   Side stepping 3L Blue     Other:  Manual: DI to L piriformis, distal & proximal L hip flexor, L glut max/med, L DL  Grades I/II joint mobilizations to L hip in supine - hooklying      Specific Instructions for next treatment: Recheck pelvic alignment      Treatment Charges: Mins Units   [x]  Modalities:   ES/HP  US   20/0  10   1/0  1   [x]  Ther Exercise 10 1   [x]  Manual Therapy 5 -   []  Ther Activities     []  Aquatics     []  Vasocompression     []  Other     Total Treatment time 45 3       Assessment: [x] Progressing toward goals. [] No change. [x] Other: Initiated session with HP/ES with 2 patches posterior and 2 patches anterior today per primary PT. Followed ES with US for deep tissue heating and relaxation. DI completed to B hip flexors with L tighter than R today. Able to complete ex listed above with no increase in pain. STG: (to be met in 6 treatments)  1. Normal pelvic alignment  2. ? Strength:improve core strength  3. ? Function:Able to have intercouse with less then 2/10 pain  4. Independent with Home Exercise Programs  LTG: (to be met in 12 treatments)  1. 0/10 pain with intercourse at penetration and deep  2. Able to perform all advanced ADL's without difficulty  3.  PF strength grossly 4/5      Patient

## 2020-02-20 ENCOUNTER — OFFICE VISIT (OUTPATIENT)
Dept: ORTHOPEDIC SURGERY | Age: 40
End: 2020-02-20
Payer: COMMERCIAL

## 2020-02-20 ENCOUNTER — HOSPITAL ENCOUNTER (OUTPATIENT)
Dept: PHYSICAL THERAPY | Facility: CLINIC | Age: 40
Setting detail: THERAPIES SERIES
Discharge: HOME | End: 2020-02-20
Payer: COMMERCIAL

## 2020-02-20 VITALS
WEIGHT: 199 LBS | BODY MASS INDEX: 29.47 KG/M2 | HEIGHT: 69 IN | SYSTOLIC BLOOD PRESSURE: 124 MMHG | DIASTOLIC BLOOD PRESSURE: 83 MMHG | HEART RATE: 103 BPM

## 2020-02-20 PROCEDURE — 97140 MANUAL THERAPY 1/> REGIONS: CPT

## 2020-02-20 PROCEDURE — 99213 OFFICE O/P EST LOW 20 MIN: CPT | Performed by: ORTHOPAEDIC SURGERY

## 2020-02-20 PROCEDURE — 97035 APP MDLTY 1+ULTRASOUND EA 15: CPT

## 2020-02-20 PROCEDURE — G0283 ELEC STIM OTHER THAN WOUND: HCPCS

## 2020-02-20 ASSESSMENT — ENCOUNTER SYMPTOMS
CONSTIPATION: 0
ABDOMINAL DISTENTION: 0
ABDOMINAL PAIN: 0
CHEST TIGHTNESS: 0
COUGH: 0
VOMITING: 0
SHORTNESS OF BREATH: 0
DIARRHEA: 0
APNEA: 0
COLOR CHANGE: 0
NAUSEA: 0

## 2020-02-20 NOTE — FLOWSHEET NOTE
relaxation ex, PF/core ex      Exercises:  Exercise Reps/ Time Weight/ Level Comments    MET 10 10 sec    x   Supine PRC ex 20        Piriformis stretches 3 30 sec    x   Hip flexor stretches 10 10 sec    x   Diaphragmatic breathing       Bridges on SB 2x15    x   Clamshells: Supine & Sidelying 2x15 Lime  x   Prone hip extension 2x15      Nordic curl, reverse nordic curl 3x30''      Flying squirrels 2x15  2 pillows at hips x   Side stepping 3L Blue     Other:  Manual: DI to L piriformis, distal & proximal L hip flexor, L glut max/med, L DL  Grades I/II joint mobilizations to L hip in supine - hooklying      Specific Instructions for next treatment: Recheck pelvic alignment      Treatment Charges: Mins Units   [x]  Modalities:   ES/HP  US   20/0  10   1/0  1   []  Ther Exercise     [x]  Manual Therapy 25 2   []  Ther Activities     []  Aquatics     []  Vasocompression     []  Other     Total Treatment time 55 4       Assessment: [x] Progressing toward goals. [] No change. [x] Other: Pt present w/ R anterior rotation - correct w/ MET. Cont w/ HP/IFC - PF/anterior hip followed by US to PF. Manual DI releases to R hip w/ decr tension noted afterward w/ no pain reported. Reviewed HEP w/ pt d/t time constraints from extended time taken on manual this vs. Plan to progress as pt tolerates. STG: (to be met in 6 treatments)  1. Normal pelvic alignment  2. ? Strength:improve core strength  3. ? Function:Able to have intercouse with less then 2/10 pain  4. Independent with Home Exercise Programs  LTG: (to be met in 12 treatments)  1. 0/10 pain with intercourse at penetration and deep  2. Able to perform all advanced ADL's without difficulty  3. PF strength grossly 4/5       Patient goals: Pelvic pain to go away or less especially when I have intercourse    Pt.  Education:  [x] Yes  [] No  [] Reviewed Prior HEP/Ed  Method of Education: [x] Verbal  [x] Demo  [] Written  Comprehension of Education:  [x] [place]

## 2020-02-20 NOTE — PROGRESS NOTES
[facility] [address] AND SPORTS MEDICINE  [address] [place] [id number]  Dept: [phone]  Dept Fax: [phone]        Bilateral Hip Office Visit    Subjective:     Chief Complaint   Patient presents with    Follow-up     Patient is here today to follow up on her bilateral femorracetabular impingement of the hips. HPI:     Hernesto Goddard presents with a 2 year history of pain in the bilateral hip where the right hip is worse than the left. Occup[ation: .The pain does not radiate into the thigh and into the groin. The pain is present over the lateral aspect of the hip. The pain is most troublesome during ambulatory activity and now limits the patient`s walking distance to shorter distances. Night pain, which disturbs the patient`s sleep is a problem. The patient has had to give up some activities such as exercising, lying down and standing for long periods of time, which has produced a consequent deterioration in quality of life. She has tried a heating pad, home exercises, naproxen and reduction of activity and reports little improvement. Back pain is not present and does not interfere with the patient`s life as does the hip. The patient has not had a cortisone injection. The patient has tried physical therapy with no pain relief. The patient has not had surgery. Knee pain is noted in the right knee. Patient is here today so she may develop a better plan for her hip pain. ROS:     Review of Systems   Constitutional: Positive for activity change. Negative for appetite change, fatigue and fever. Respiratory: Negative for apnea, cough, chest tightness and shortness of breath. Cardiovascular: Negative for chest pain, palpitations and leg swelling. Gastrointestinal: Negative for abdominal distention, abdominal pain, constipation, diarrhea, nausea and vomiting.    Genitourinary: Negative for difficulty urinating, dysuria and ulcerations. No obvious deformity or swelling. NEURO: The patient responds to light touch throughout bilateral LE. Patellar and Achilles reflexes are 2/4. VASC: The bilateral LE is neurovascularly intact with 2/4 DP and 2/4 PT pulses. Brisk capillary refill. ROM: 90 degrees flexion, 40 degrees abduction, 20 degrees adduction, 10 degrees of internal rotation, 30 degrees of external rotation. Psoas tendon popping. Pain with FADIR right greater than left. MUSC: Strength is 5/5 flexion, abduction, internal rotation, external rotation. PALP: The patient is not tender to palpation over the greater trochanter. Anteromedial retinacular pain. TEST: Log roll is (-). No apparent leg length discrepancy. (+) Impingement test. Negative Trendelenburg test. Negative Jadiel's test. No labral clunks. No pain on compression. Knee Exam    LOCATION:  Right Knee  GEN: Alert and oriented X 3, in no acute distress. GAIT: The patient's gait was observed while entering the exam room and was noted to be non antalgic. The extremity is in anatomic alignment. SKIN: Intact without rashes, lesions, or ulcerations. No obvious deformity or swelling. NEURO: The patient responds to light touch throughout right LE. Patellar and Achilles reflexes are 2/4. VASC: The right LE is neurovascularly intact with 2/4 DP and 2/4 PT pulses. Brisk capillary refill. ROM: 0/130 degrees. There is no effusion. MUSC: slightly decreased quad tone  SPECIAL: Diana test is positive with no clunks and no crepitation but there is minimal pain. PALP: There is no joint line pain. Assessment:     1. Bilateral hip pain    2. Pain in both knees, unspecified chronicity    3.  Femoroacetabular impingement of both hips      Procedures:    Procedure: no  Radiology:   HIP/PELVIS X-RAY    AP and standing AP of the pelvis and supine AP and frog leg lateral of the bilateral hip radiographs were obtained today and demonstrate joint spacing is within normal limits and she came back for the review. I was able to pinpoint that the hips do not have much joint space loss and neither do the knees. So I informed the patient that since the joint space is not that bad and she is having pain, I explained to her that she may have developed a labral tear that is precipitating her pain and I feel an MRI arthrogam is needed to pinpoint the tear and the size of the tear that she has in the hip. So I asked the patient if she is willing to have the MRI arthrogram done so we can see if there is a labral tear behind her hip pain and she stated that she is willing to have the MRI arthrogram. So at this time, the patient has opted to get an MRI arthrogram of the right hip. Patient should return to the clinic after the MRI arthrogram and she is to follow up with Nima Mayberry D.O. The patient will call the office immediately with any problems. No orders of the defined types were placed in this encounter.     Orders Placed This Encounter   Procedures    XR HIP RIGHT MIN 4VW W PELVIS     Standing Status:   Future     Number of Occurrences:   1     Standing Expiration Date:   2/20/2021     Order Specific Question:   Reason for exam:     Answer:   pain    XR HIP LEFT (2-3 VIEWS)     Standing Status:   Future     Number of Occurrences:   1     Standing Expiration Date:   2/20/2021     Order Specific Question:   Reason for exam:     Answer:   pain    XR KNEE RIGHT (MIN 4 VIEWS)     Standing Status:   Future     Number of Occurrences:   1     Standing Expiration Date:   2/20/2021     Order Specific Question:   Reason for exam:     Answer:   pain    XR KNEE LEFT (1-2 VIEWS)     Standing Status:   Future     Number of Occurrences:   1     Standing Expiration Date:   2/20/2021     Order Specific Question:   Reason for exam:     Answer:   pain    MRI HIP RIGHT W CONTRAST     Standing Status:   Future     Standing Expiration Date:   2/20/2021     Scheduling Instructions:      MRI arthrogram     Order Specific Question:   Reason for exam:     Answer:   possible labreal tear     I, Jarred Lewis V, am scribing for and in the presence of Biju Trujillo D.O. 2/23/2020  8:12 PM        I, Biju Trujillo DO, have personally seen this patient and I have reviewed the CC, PMH, FHX and Social History as provided by other clinical staff. I reassessed the HPI and ROS as scribed by Minerva Choudhary in my presence and it is both accurate and complete. Thereafter, I personally performed the PE, reviewed the imaging and established the DX and POC. I agree with the documentation provided by the Medical Scribe. I have reviewed all documentation in its entirety prior to providing my signature indicating agreement. Any areas of disagreement are noted on the chart.     Electronically signed by Rajni Aceves DO on 2/23/2020 at 8:12 PM          Electronically signed by Rajni Aceves DO, on 2/23/2020 at 8:12 PM

## 2020-02-27 ENCOUNTER — HOSPITAL ENCOUNTER (OUTPATIENT)
Age: 40
Setting detail: SPECIMEN
Discharge: HOME | End: 2020-02-27
Payer: COMMERCIAL

## 2020-02-27 ENCOUNTER — OFFICE VISIT (OUTPATIENT)
Dept: OBGYN CLINIC | Age: 40
End: 2020-02-27
Payer: COMMERCIAL

## 2020-02-27 ENCOUNTER — HOSPITAL ENCOUNTER (OUTPATIENT)
Dept: PHYSICAL THERAPY | Facility: CLINIC | Age: 40
Setting detail: THERAPIES SERIES
Discharge: HOME | End: 2020-02-27
Payer: COMMERCIAL

## 2020-02-27 VITALS
HEIGHT: 69 IN | DIASTOLIC BLOOD PRESSURE: 60 MMHG | BODY MASS INDEX: 29.03 KG/M2 | WEIGHT: 196 LBS | SYSTOLIC BLOOD PRESSURE: 120 MMHG

## 2020-02-27 PROCEDURE — 97140 MANUAL THERAPY 1/> REGIONS: CPT

## 2020-02-27 PROCEDURE — 99395 PREV VISIT EST AGE 18-39: CPT | Performed by: OBSTETRICS & GYNECOLOGY

## 2020-02-27 ASSESSMENT — ENCOUNTER SYMPTOMS
NAUSEA: 0
DIARRHEA: 1
VOMITING: 0
CONSTIPATION: 1
RHINORRHEA: 0
SHORTNESS OF BREATH: 0
COUGH: 0
ABDOMINAL PAIN: 0
COLOR CHANGE: 0
BACK PAIN: 0

## 2020-02-27 NOTE — TELEPHONE ENCOUNTER
Last OARRS Review-0  Last Office Visit-01/30/2020  Return To Office-3 months   Next Appointment Date-02/27/2020  Last Refill Date-11/26/2019  Number of Refills Given- 2  Labs associated with Medication done-0      Health Maintenance   Topic Date Due    Varicella vaccine (1 of 2 - 2-dose childhood series) 12/28/1981    Lipid screen  02/06/2021    Potassium monitoring  02/06/2021    Creatinine monitoring  02/06/2021    Cervical cancer screen  01/31/2024    DTaP/Tdap/Td vaccine (2 - Td) 10/16/2024    Shingles Vaccine (1 of 2) 12/28/2030    Flu vaccine  Completed    HIV screen  Completed    Hepatitis A vaccine  Aged Out    Hepatitis B vaccine  Aged Out    Hib vaccine  Aged Out    Meningococcal (ACWY) vaccine  Aged Out    Pneumococcal 0-64 years Vaccine  Aged Out             (applicable per patient's age: Cancer Screenings, Depression Screening, Fall Risk Screening, Immunizations)    LDL Cholesterol (mg/dL)   Date Value   02/06/2020 86     AST (U/L)   Date Value   02/06/2020 14     ALT (U/L)   Date Value   02/06/2020 20     BUN (mg/dL)   Date Value   02/06/2020 16      (goal A1C is < 7)   (goal LDL is <100) need 30-50% reduction from baseline     BP Readings from Last 3 Encounters:   02/20/20 124/83   01/30/20 112/72   01/09/20 123/74    (goal /80)      All Future Testing planned in [facility]:  Lab Frequency Next Occurrence   Urinalysis Reflex to Culture Once 08/01/2019   Insulin-Like Growth Factor Once 02/13/2020   Vitamin D 25 Hydroxy Once 05/10/2020   Luteinizing Hormone Once 85/93/1461   Follicle Stimulating Hormone Once 02/10/2020   MRI HIP RIGHT W CONTRAST Once 02/20/2020       Next Visit Date:  Future Appointments   Date Time Provider Ras Rodas   2/27/2020  1:30 PM DO Александр Saxena [facility]   2/27/2020  6:00 PM Chloe Ochoa PTA [facility]   3/5/2020 11:00 AM Chloe Ochoa  [facility]            Patient Active Problem List:     Allergic

## 2020-02-27 NOTE — FLOWSHEET NOTE
[x] [address]   Outpatient Rehabilitation &  Therapy  [address] [place] [address]  P: [phone]  F: [phone] [] [facility]  Outpatient Rehabilitation &  Therapy  [place] [address]  [place]: [phone]  F: [phone]     Physical Therapy Daily Treatment Note    Date:  2020  Patient Name:  Joanna López    :  1980  MRN: [id number]  Physician: Dr Caal Mace: MMO (40 vs)  Medical Diagnosis: Dyspareunia, Abnormal skin of vulva                 Rehab Codes: N94.10, M62.40, M99.04  Onset Date: 3/1/2019                                  Next 's appt: 10/31/19  Visit# / total visits:   Cancels/No Shows: 0    Subjective:    Pain:  [x] Yes  [] No Location: R hip, R SIJ/LB Pain Rating: (0-10 scale) 2-3/10  Pain altered Tx:  [] No  [] Yes  Action: HP/ESTIM & US  Comments: Pt arrived stating she feels good today. Appt w/ OB today states he feels she is at a good place and no current problems other than sensitive skin this date. Also states she fell off of her bed last Thursday and landed on her L side.        Objective:  Modalities:   Treatment Location  Left      Right                          [id number]   PF [x]          [x]  [x] Supine    [] Prone   [] Side lying  [] Sitting         Treatment Modality   1 Hot Pack:    [x] Large   [] Medium    [x] Cervical     ___20__ min     Cold Pack   [] Large   [] Medium    [] Cervical     _____ min     Vasocompression    __° temp    ____pressure     _____ min   1 Electrical Stim:    [] IFC     [] MFAC      [x] HVPC                          Protocol:___analgesic____X__20___min                          #Channels:  [] 1        [] 2       [] Other:   1 Ultrasound: _1.5_____W/cm2 x  ___10___min              [x] 1MHz   [] 3Mhz                      Duty Factor: [x] 100%  [] 50%   [] 20%                  Add: [] Lidex   [] Stim    [] Gel pad        Massage:    [] Soft Tissue Demonstrates/verbalizes HEP/Ed previously given. Plan: [x] Continue per plan of care.    [] Other:       Time In: 1800       Time Out: 1900    Electronically signed by:  Duane Mcmahan PTA

## 2020-02-27 NOTE — PROGRESS NOTES
Functional dyspepsia     Headache(784.0)     HPV (human papilloma virus) anogenital infection     Hyperlipidemia     Syncope     Unspecified constipation     Unspecified sleep apnea                                                                    Past Surgical History:   Procedure Laterality Date    BREAST SURGERY Left     cyst removed    COLONOSCOPY N/A 05/14/2014    RECTAL BIOPSY, normal mucosa    COLPOSCOPY  2017    INTRAUTERINE DEVICE INSERTION  05/13/2015    Mirena    WI COLPOSCOPY,CERVIX W/ADJ VAG,W/LOOP BX N/A 7/5/2018    LEEP performed by Destiny [facility] DO at [facility] ENDOSCOPY  12/10/2014    Small hiatal hernia. esophageal ring     WISDOM TOOTH EXTRACTION       Family History   Problem Relation Age of Onset    Parkinsonism Other     Diabetes Mother     Cancer Mother         Breast    Hypertension Mother     Other Mother         pacemaker    Heart Attack Mother     Arthritis Father     Heart Disease Maternal Grandmother     Diabetes Maternal Grandmother     Cancer Maternal Grandfather     Diabetes Paternal Grandmother     Heart Disease Paternal Grandmother     Diabetes Paternal Grandfather     Heart Disease Paternal Grandfather      Social History     Tobacco Use   Smoking Status Never Smoker   Smokeless Tobacco Never Used     Social History     Substance and Sexual Activity   Alcohol Use Yes    Alcohol/week: 0.0 standard drinks    Comment: occasional        Social History     Tobacco History     Smoking Status  Never Smoker    Smokeless Tobacco Use  Never Used          Alcohol History     Alcohol Use Status  Yes Drinks/Week  0 Standard drinks or equivalent per week Amount  0.0 oz alcohol/wk Comment  occasional          Drug Use     Drug Use Status  No          Sexual Activity     Sexually Active  Yes Partners  Male Birth Control/Protection  IUD              Allergies   Allergen Reactions    Other Hives     Advil Sinus Congestion & Pain    Ibuprofen Hives    Pollen Extract      Seasonal allergies     Current Outpatient Medications   Medication Sig Dispense Refill    Lactobacillus (ACIDOPHILUS/L-SPOROGENES) TABS TAKE 1 TABLET BY MOUTH DAILY 30 tablet 5    vitamin D (ERGOCALCIFEROL) 1.25 MG (72866 UT) CAPS capsule Take 1 capsule by mouth once a week 12 capsule 1    Lactobacillus (PROBIOTIC ACIDOPHILUS PO) Take 1 tablet by mouth daily      clobetasol (TEMOVATE) 0.05 % ointment Apply topically as needed      cyclobenzaprine (FLEXERIL) 10 MG tablet Take 10 mg by mouth as needed      guaiFENesin (MUCINEX) 600 MG extended release tablet Take 600 mg by mouth every 12 hours as needed      levonorgestrel (MIRENA) IUD 52 mg 1 each by Intrauterine route      pantoprazole (PROTONIX) 40 MG tablet Take 40 mg by mouth 2 times daily      naproxen (NAPROSYN) 500 MG tablet Take 1 tablet by mouth 2 times daily (with meals) 60 tablet 2    clotrimazole-betamethasone (LOTRISONE) 1-0.05 % cream APPLY TOPICALLY TWICE DAILY FOR 3 DAYS THEN DAILY FOR 4 DAYS 45 g 0    fluticasone (FLONASE) 50 MCG/ACT nasal spray 1 spray by Nasal route every morning (before breakfast) 1 Bottle 5    amitriptyline (ELAVIL) 25 MG tablet Take 1 tablet by mouth nightly (Patient taking differently: Take 60 mg by mouth nightly Per [facility] for vaginal itching) 90 tablet 1    folic acid (FOLVITE) 1 MG tablet Take 1 tablet by mouth daily 90 tablet 1    atorvastatin (LIPITOR) 40 MG tablet TAKE 1 TABLET BY MOUTH DAILY 90 tablet 3    topiramate (TOPAMAX) 50 MG tablet   3    ondansetron (ZOFRAN-ODT) 4 MG disintegrating tablet Take 1 tablet by mouth every 8 hours as needed for Nausea or Vomiting 5 tablet 0    spironolactone (ALDACTONE) 50 MG tablet Take 100 mg by mouth daily   5    SUMAtriptan (IMITREX) 50 MG tablet Take 50 mg by mouth once as needed for Migraine      sucralfate (CARAFATE) 1 GM tablet TAKE 1 TABLET 4 TIMES DAILY (Patient taking differently: 2 times daily ) 120 tablet sounds: Normal heart sounds. No murmur. No friction rub. No gallop. Comments: No bilateral calf tenderness or swelling  Pulmonary:      Effort: Pulmonary effort is normal. No respiratory distress. Breath sounds: Normal breath sounds. No wheezing. Abdominal:      General: Bowel sounds are normal.      Palpations: Abdomen is soft. Tenderness: There is no abdominal tenderness. Genitourinary:     Comments: Breasts nipples everted, no masses or tenderness, does BSE  Vulva-no lesions, errythema  Vagina-pink rugated  Cervix-firm, 2 cm. Nontender, freely movable, no lesions,  IUD Strings noted  Uterus-ant. Smooth, firm, nontender, freely movable  Adnexa-no masses or tenderness   Musculoskeletal: Normal range of motion. Lymphadenopathy:      Cervical: No cervical adenopathy. Skin:     General: Skin is warm and dry. Findings: No rash. Neurological:      Mental Status: She is alert and oriented to person, place, and time. Cranial Nerves: No cranial nerve deficit. Deep Tendon Reflexes: Reflexes are normal and symmetric. Psychiatric:         Behavior: Behavior normal.         Thought Content: Thought content normal.         Judgment: Judgment normal.       /60 (Site: Right Upper Arm, Position: Sitting, Cuff Size: Small Adult)   Ht 5' 9\" (1.753 m)   Wt 196 lb (88.9 kg)   BMI 28.94 kg/m²     Assessment:       Diagnosis Orders   1. Women's annual routine gynecological examination  PAP Smear   2. Vaginal discharge  VAGINITIS DNA PROBE   3. Vaginal irritation  VAGINITIS DNA PROBE       Breast exam completed. Pelvic exam pap smear collected and sent. Cultures sent Yes    Plan:     BSE reviewed  STD prevention reviewed  Vag irritation- check vag dna gbs. Declines GC. Continue with [facility] for lichen sclerosus  BC  Yes-iud  DVT signs reviewed with patient. Refill medication if appropriate  Diet & Exercise reviewed with pt.   Preventive  Health through PCP   RV prn/annual           No orders of the defined types were placed in this encounter. Patient given educational materials - seepatient instructions. Discussed use, benefit, and side effects of prescribed medications. All patient questions answered. Pt voiced understanding. Reviewed health maintenance. Instructed to continue current medications, diet and exercise. Patient agreedwith treatment plan. Follow up as directed.       Electronically signed by Aguila Howell DO on 2/27/2020at 2:37 PM

## 2020-02-28 LAB
DIRECT EXAM: NORMAL
Lab: NORMAL
SPECIMEN DESCRIPTION: NORMAL

## 2020-02-29 LAB
HPV SAMPLE: ABNORMAL
HPV, GENOTYPE 16: DETECTED
HPV, GENOTYPE 18: NOT DETECTED
HPV, HIGH RISK OTHER: DETECTED
HPV, INTERPRETATION: ABNORMAL
SPECIMEN DESCRIPTION: ABNORMAL

## 2020-03-01 RX ORDER — NAPROXEN 500 MG/1
500 TABLET ORAL 2 TIMES DAILY WITH MEALS
Qty: 60 TABLET | Refills: 5 | Status: SHIPPED | OUTPATIENT
Start: 2020-03-01 | End: 2020-10-28

## 2020-03-09 LAB — CYTOLOGY REPORT: NORMAL

## 2020-03-11 ENCOUNTER — TELEPHONE (OUTPATIENT)
Dept: OBGYN CLINIC | Age: 40
End: 2020-03-11

## 2020-03-11 NOTE — TELEPHONE ENCOUNTER
----- Message from Gallo Vega DO sent at 3/10/2020  3:56 PM EDT -----  Please notify patient of normal pap smear but +HR HPV. She has had colposcopies before with history of recurrent vaginitis and lichen sclerosus. I recommend repeat pap smear in 12 months with HPV co testing. Thank you.

## 2020-03-17 ENCOUNTER — OFFICE VISIT (OUTPATIENT)
Dept: FAMILY MEDICINE CLINIC | Age: 40
End: 2020-03-17
Payer: COMMERCIAL

## 2020-03-17 VITALS
DIASTOLIC BLOOD PRESSURE: 70 MMHG | OXYGEN SATURATION: 99 % | HEART RATE: 101 BPM | SYSTOLIC BLOOD PRESSURE: 110 MMHG | WEIGHT: 201 LBS | BODY MASS INDEX: 29.77 KG/M2 | TEMPERATURE: 97.5 F | HEIGHT: 69 IN

## 2020-03-17 LAB — S PYO AG THROAT QL: NORMAL

## 2020-03-17 PROCEDURE — 99213 OFFICE O/P EST LOW 20 MIN: CPT | Performed by: NURSE PRACTITIONER

## 2020-03-17 PROCEDURE — 87880 STREP A ASSAY W/OPTIC: CPT | Performed by: NURSE PRACTITIONER

## 2020-03-17 RX ORDER — AMITRIPTYLINE HYDROCHLORIDE 10 MG/1
150 TABLET, FILM COATED ORAL NIGHTLY
COMMUNITY
Start: 2020-02-27

## 2020-03-17 RX ORDER — L. ACIDOPHILUS/LACTOBAC SPOR 35MM-25MM
TABLET ORAL
COMMUNITY
Start: 2020-02-13 | End: 2020-10-06

## 2020-03-17 RX ORDER — AMITRIPTYLINE HYDROCHLORIDE 25 MG/1
TABLET, FILM COATED ORAL
COMMUNITY
Start: 2020-03-05 | End: 2022-02-14 | Stop reason: ALTCHOICE

## 2020-03-17 ASSESSMENT — ENCOUNTER SYMPTOMS
SORE THROAT: 1
WHEEZING: 0
ABDOMINAL PAIN: 0
SHORTNESS OF BREATH: 0
RHINORRHEA: 0
EYE DISCHARGE: 0
EYE REDNESS: 0
VOICE CHANGE: 0
SINUS PRESSURE: 1
SWOLLEN GLANDS: 0
CONSTIPATION: 0
SINUS PAIN: 1
COUGH: 0
VOMITING: 0
TROUBLE SWALLOWING: 0
EYE PAIN: 0
EYE ITCHING: 0
DIARRHEA: 0
NAUSEA: 0

## 2020-03-17 NOTE — PROGRESS NOTES
Lactobacillus acidophilus Lactobacillus (ACIDOPHILUS/L-SPOROGENES) TABS Indications: Dyspareunia, female , Vulvar irritation , Abnormal skin of vulva TAKE 1 TABLET BY MOUTH DAILY 30 tablet 5 02/13/2020 Active 02- [place] ([id number])      naproxen (NAPROSYN) 500 MG tablet Take 1 tablet by mouth 2 times daily (with meals) 60 tablet 5    vitamin D (ERGOCALCIFEROL) 1.25 MG (87196 UT) CAPS capsule Take 1 capsule by mouth once a week 12 capsule 1    Lactobacillus (PROBIOTIC ACIDOPHILUS PO) Take 1 tablet by mouth daily      clobetasol (TEMOVATE) 0.05 % ointment Apply topically as needed      cyclobenzaprine (FLEXERIL) 10 MG tablet Take 10 mg by mouth as needed      guaiFENesin (MUCINEX) 600 MG extended release tablet Take 600 mg by mouth every 12 hours as needed      levonorgestrel (MIRENA) IUD 52 mg 1 each by Intrauterine route      pantoprazole (PROTONIX) 40 MG tablet Take 40 mg by mouth 2 times daily      clotrimazole-betamethasone (LOTRISONE) 1-0.05 % cream APPLY TOPICALLY TWICE DAILY FOR 3 DAYS THEN DAILY FOR 4 DAYS 45 g 0    fluticasone (FLONASE) 50 MCG/ACT nasal spray 1 spray by Nasal route every morning (before breakfast) 1 Bottle 5    folic acid (FOLVITE) 1 MG tablet Take 1 tablet by mouth daily 90 tablet 1    atorvastatin (LIPITOR) 40 MG tablet TAKE 1 TABLET BY MOUTH DAILY 90 tablet 3    topiramate (TOPAMAX) 50 MG tablet   3    ondansetron (ZOFRAN-ODT) 4 MG disintegrating tablet Take 1 tablet by mouth every 8 hours as needed for Nausea or Vomiting 5 tablet 0    spironolactone (ALDACTONE) 50 MG tablet Take 100 mg by mouth daily   5    SUMAtriptan (IMITREX) 50 MG tablet Take 50 mg by mouth once as needed for Migraine      sucralfate (CARAFATE) 1 GM tablet TAKE 1 TABLET 4 TIMES DAILY (Patient taking differently: 2 times daily ) 120 tablet 1    EPINEPHrine (EPIPEN) 0.3 MG/0.3ML SOAJ injection Inject 0.3 mg into the muscle as needed      fexofenadine (ALLEGRA) 180 MG tablet Take 180 mg by mouth daily.  amitriptyline (ELAVIL) 25 MG tablet Take 1 tablet by mouth nightly (Patient taking differently: Take 60 mg by mouth nightly Per [facility] for vaginal itching) 90 tablet 1     No current facility-administered medications for this visit. Patient ID: Nicanor Deshpande is a 44 y.o. female. Patient presents in office today with concerns about headache and sore throat that started over weekend. She was in [facility] over weekend. Has not been in contact with any known COVID-19 patients. No cough. No fevers. Sinusitis   This is a new problem. The current episode started in the past 7 days. There has been no fever. Associated symptoms include headaches, sinus pressure, sneezing and a sore throat. Pertinent negatives include no congestion, coughing, ear pain, shortness of breath or swollen glands. Review of Systems   Constitutional: Positive for fatigue. Negative for appetite change and fever. HENT: Positive for sinus pressure, sinus pain (face hurts), sneezing and sore throat. Negative for congestion, ear pain, postnasal drip, rhinorrhea, trouble swallowing and voice change. Eyes: Negative for pain, discharge, redness and itching. Respiratory: Negative for cough, shortness of breath and wheezing. Cardiovascular: Negative for chest pain. Gastrointestinal: Negative for abdominal pain, constipation, diarrhea, nausea and vomiting. Musculoskeletal: Positive for myalgias (a little achy). Skin: Negative for rash. Neurological: Positive for headaches. Psychiatric/Behavioral: Negative for sleep disturbance. Objective:     /70 (Site: Right Upper Arm, Position: Sitting, Cuff Size: Large Adult)   Pulse 101   Temp 97.5 °F (36.4 °C) (Oral)   Ht 5' 8.5\" (1.74 m)   Wt 201 lb (91.2 kg)   SpO2 99%   BMI 30.12 kg/m²      Physical Exam  Vitals signs and nursing note reviewed. Constitutional:       General: She is not in acute distress.      Appearance: Normal appearance. She is not ill-appearing or toxic-appearing. HENT:      Head: Normocephalic and atraumatic. Right Ear: Tympanic membrane and external ear normal.      Left Ear: Tympanic membrane and external ear normal.      Nose: Mucosal edema present. Right Sinus: Frontal sinus tenderness present. Left Sinus: Frontal sinus tenderness present. Mouth/Throat:      Mouth: Mucous membranes are moist.      Pharynx: Oropharynx is clear. No pharyngeal swelling or posterior oropharyngeal erythema. Eyes:      Conjunctiva/sclera: Conjunctivae normal.   Neck:      Musculoskeletal: Neck supple. Cardiovascular:      Rate and Rhythm: Normal rate and regular rhythm. Pulmonary:      Effort: Pulmonary effort is normal. No respiratory distress. Breath sounds: Normal breath sounds. No wheezing, rhonchi or rales. Skin:     General: Skin is warm and dry. Neurological:      Mental Status: She is alert and oriented to person, place, and time. Psychiatric:         Mood and Affect: Mood normal.         Behavior: Behavior normal.         Assessment / Plan:     1. Acute non-recurrent frontal sinusitis    Educated likely viral etiology. OTC medication for symptoms management. Monitor for worsening symptoms. 2. Sore throat    - POCT rapid strep A- negative    3. Nonintractable episodic headache, unspecified headache type        OTC Tylenol or ibuprofen as needed  Monitor for worsening symptoms  If fever arises, self quarantine d/t COVID-19  Call office with concerns        David Blount received counseling on the following healthy behaviors: nutrition and medication adherence  Reviewed prior labs and health maintenance. Continue current medications, diet and exercise. Discussed use, benefit, and side effects of prescribed medications. Barriers to medication compliance addressed. Patient given educational materials - see patient instructions. All patient questions answered.   Patient voiced

## 2020-03-20 RX ORDER — FOLIC ACID 1 MG/1
1 TABLET ORAL DAILY
Qty: 90 TABLET | Refills: 1 | Status: SHIPPED | OUTPATIENT
Start: 2020-03-20 | End: 2020-07-30

## 2020-06-21 ENCOUNTER — HOSPITAL ENCOUNTER (OUTPATIENT)
Dept: PREADMISSION TESTING | Age: 40
Setting detail: SPECIMEN
Discharge: HOME | End: 2020-06-25
Payer: COMMERCIAL

## 2020-06-21 PROCEDURE — U0004 COV-19 TEST NON-CDC HGH THRU: HCPCS

## 2020-06-22 LAB
SARS-COV-2, PCR: NOT DETECTED
SARS-COV-2, RAPID: NORMAL
SARS-COV-2: NORMAL
SOURCE: NORMAL

## 2020-06-25 ENCOUNTER — HOSPITAL ENCOUNTER (OUTPATIENT)
Dept: INTERVENTIONAL RADIOLOGY/VASCULAR | Age: 40
Discharge: HOME | End: 2020-06-27
Payer: COMMERCIAL

## 2020-06-25 ENCOUNTER — HOSPITAL ENCOUNTER (OUTPATIENT)
Dept: MRI IMAGING | Age: 40
Discharge: HOME | End: 2020-06-27
Payer: COMMERCIAL

## 2020-06-25 PROCEDURE — 6360000004 HC RX CONTRAST MEDICATION: Performed by: ORTHOPAEDIC SURGERY

## 2020-06-25 PROCEDURE — 73722 MRI JOINT OF LWR EXTR W/DYE: CPT

## 2020-06-25 PROCEDURE — 2709999900 IR INJ ARTHROGRAM HIP RIGHT

## 2020-06-25 PROCEDURE — A9579 GAD-BASE MR CONTRAST NOS,1ML: HCPCS | Performed by: ORTHOPAEDIC SURGERY

## 2020-06-25 PROCEDURE — 20610 DRAIN/INJ JOINT/BURSA W/O US: CPT | Performed by: RADIOLOGY

## 2020-06-25 PROCEDURE — 77002 NEEDLE LOCALIZATION BY XRAY: CPT | Performed by: RADIOLOGY

## 2020-06-25 RX ADMIN — GADOTERIDOL 1 ML: 279.3 INJECTION, SOLUTION INTRAVENOUS at 10:10

## 2020-06-25 RX ADMIN — IOHEXOL 50 ML: 240 INJECTION, SOLUTION INTRATHECAL; INTRAVASCULAR; INTRAVENOUS; ORAL at 09:16

## 2020-06-25 NOTE — PROGRESS NOTES
Rt hip prepped draped and numbed, Injected with 0.2 prohance and omnique 240. Needle removed and band aid applied. Tolerated well. To MRI ambulatory

## 2020-06-29 ENCOUNTER — PATIENT MESSAGE (OUTPATIENT)
Dept: OBGYN CLINIC | Age: 40
End: 2020-06-29

## 2020-06-29 NOTE — TELEPHONE ENCOUNTER
From: Jaren Boykin  To: Fazal Banda DO  Sent: 6/29/2020 6:52 AM EDT  Subject: Prescription Question    Dr. Edie Griffiths,    I am having symptoms of yeast infection that started last week on Friday and really havent seem to improve. I was hoping you would be able to dispense some medicine to help since I have had this issue in the past. If you need to see to me I would be willing to make a appointment if need be. Thank you.   Jaren Boykin

## 2020-06-30 ENCOUNTER — TELEPHONE (OUTPATIENT)
Dept: ORTHOPEDIC SURGERY | Age: 40
End: 2020-06-30

## 2020-06-30 RX ORDER — FLUCONAZOLE 150 MG/1
150 TABLET ORAL
Qty: 2 TABLET | Refills: 0 | Status: SHIPPED | OUTPATIENT
Start: 2020-06-30 | End: 2021-01-20

## 2020-07-06 NOTE — TELEPHONE ENCOUNTER
Last visit: 1-30-20  Last Med refill: 2-10-20  Does patient have enough medication for 72 hours: No:     Next Visit Date:  Future Appointments   Date Time Provider Ras Rodas   7/9/2020  9:20 AM [place], [address] Maintenance   Topic Date Due    Varicella vaccine (1 of 2 - 2-dose childhood series) [birth year]/28/1981    Flu vaccine (1) 09/01/2020    Lipid screen  02/06/2021    Potassium monitoring  03/09/2021    Creatinine monitoring  03/09/2021    DTaP/Tdap/Td vaccine (2 - Td) 10/16/2024    Cervical cancer screen  02/27/2025    HIV screen  Completed    Hepatitis A vaccine  Aged Out    Hepatitis B vaccine  Aged Out    Hib vaccine  Aged Out    Meningococcal (ACWY) vaccine  Aged Out    Pneumococcal 0-64 years Vaccine  Aged Out       No results found for: LABA1C          ( goal A1C is < 7)   No results found for: LABMICR  LDL Cholesterol (mg/dL)   Date Value   02/06/2020 86   08/06/2019 109       (goal LDL is <100)   AST (U/L)   Date Value   02/06/2020 14     ALT (U/L)   Date Value   02/06/2020 20     BUN (mg/dL)   Date Value   02/06/2020 16     BP Readings from Last 3 Encounters:   03/17/20 110/70   02/27/20 120/60   02/20/20 124/83          (goal 120/80)    All Future Testing planned in [facility]  Lab Frequency Next Occurrence   Urinalysis Reflex to Culture Once 08/01/2019   Insulin-Like Growth Factor Once 02/13/2020   Vitamin D 25 Hydroxy Once 05/10/2020   Luteinizing Hormone Once 84/42/8205   Follicle Stimulating Hormone Once 02/10/2020   PAP Smear Once 02/27/2020               Patient Active Problem List:     Allergic rhinitis     GERD (gastroesophageal reflux disease)     Migraine without aura     Constipation     Mild episode of recurrent major depressive disorder (HCC)     Anxiety     Hyperlipidemia     High risk HPV infection     Vasovagal syncope     Lichen sclerosus     Dysplasia of cervix, high grade CAMERON 2     Vulvar intraepithelial neoplasia (NELIDA) grade 1     S/P LEEP 7/5/18 with IUD removal     Left ovarian cyst     Depression     Other fatigue

## 2020-07-09 ENCOUNTER — OFFICE VISIT (OUTPATIENT)
Dept: ORTHOPEDIC SURGERY | Age: 40
End: 2020-07-09
Payer: COMMERCIAL

## 2020-07-09 VITALS
BODY MASS INDEX: 30.1 KG/M2 | WEIGHT: 198.6 LBS | HEIGHT: 68 IN | SYSTOLIC BLOOD PRESSURE: 115 MMHG | HEART RATE: 90 BPM | DIASTOLIC BLOOD PRESSURE: 71 MMHG

## 2020-07-09 PROCEDURE — 99213 OFFICE O/P EST LOW 20 MIN: CPT | Performed by: ORTHOPAEDIC SURGERY

## 2020-07-09 PROCEDURE — 1036F TOBACCO NON-USER: CPT | Performed by: ORTHOPAEDIC SURGERY

## 2020-07-09 PROCEDURE — 20611 DRAIN/INJ JOINT/BURSA W/US: CPT | Performed by: ORTHOPAEDIC SURGERY

## 2020-07-09 PROCEDURE — G8427 DOCREV CUR MEDS BY ELIG CLIN: HCPCS | Performed by: ORTHOPAEDIC SURGERY

## 2020-07-09 PROCEDURE — G8417 CALC BMI ABV UP PARAM F/U: HCPCS | Performed by: ORTHOPAEDIC SURGERY

## 2020-07-09 RX ORDER — LIDOCAINE HYDROCHLORIDE 10 MG/ML
8 INJECTION, SOLUTION INFILTRATION; PERINEURAL ONCE
Status: COMPLETED | OUTPATIENT
Start: 2020-07-09 | End: 2020-07-09

## 2020-07-09 RX ORDER — METHYLPREDNISOLONE ACETATE 40 MG/ML
40 INJECTION, SUSPENSION INTRA-ARTICULAR; INTRALESIONAL; INTRAMUSCULAR; SOFT TISSUE ONCE
Status: COMPLETED | OUTPATIENT
Start: 2020-07-09 | End: 2020-07-09

## 2020-07-09 RX ADMIN — LIDOCAINE HYDROCHLORIDE 8 ML: 10 INJECTION, SOLUTION INFILTRATION; PERINEURAL at 14:00

## 2020-07-09 RX ADMIN — METHYLPREDNISOLONE ACETATE 40 MG: 40 INJECTION, SUSPENSION INTRA-ARTICULAR; INTRALESIONAL; INTRAMUSCULAR; SOFT TISSUE at 14:00

## 2020-07-09 ASSESSMENT — ENCOUNTER SYMPTOMS
DIARRHEA: 0
APNEA: 0
CHEST TIGHTNESS: 0
VOMITING: 0
SHORTNESS OF BREATH: 0
COUGH: 0
NAUSEA: 0
ABDOMINAL DISTENTION: 0
CONSTIPATION: 0
ABDOMINAL PAIN: 0
COLOR CHANGE: 0

## 2020-07-09 NOTE — PROGRESS NOTES
[address] [facility]  [address] [place] [id number]  Dept: [phone]  Dept Fax: [phone]        Right Hip Office Visit    Subjective:     Chief Complaint   Patient presents with    Hip Pain     Rt hip. MRI review     HPI:     Ricky Ulloa who is a , presents with a 2 year history of pain in the right hip. The pain does radiate into the thigh and into the groin but it is minimal. The pain is present over the lateral aspect of the hip. The pain is most troublesome during ambulatory activity and now limits the patient`s walking distance to shorter distances. Night pain, which disturbs the patient`s sleep is a problem. The patient has had to give up some activities such as exercising, lying down on her side at night and standing for long periods of time, which has produced a consequent deterioration in quality of life. She has tried heating pad, home exercises, naproxen and reduction of activity and reports little improvement. Back pain is not present and does not interfere with the patient`s life as does the hip. The patient has not had a cortisone injection. The patient has tried physical therapy with no improvement. But she does do Roxie with no pain. The patient has not had surgery. The opposite hip is okay. Knee pain is not noted. Patient is here today to review her MRI of the right hip. ROS:     Review of Systems   Constitutional: Positive for activity change. Negative for appetite change, fatigue and fever. Respiratory: Negative for apnea, cough, chest tightness and shortness of breath. Cardiovascular: Negative for chest pain, palpitations and leg swelling. Gastrointestinal: Negative for abdominal distention, abdominal pain, constipation, diarrhea, nausea and vomiting. Genitourinary: Negative for difficulty urinating, dysuria and hematuria. Musculoskeletal: Positive for arthralgias.  Negative for gait problem, joint swelling and myalgias. Skin: Negative for color change and rash. Neurological: Negative for dizziness, weakness, numbness and headaches. Psychiatric/Behavioral: Negative for sleep disturbance.      Past Medical History:    Past Medical History:   Diagnosis Date    Acne     Allergic rhinitis     Chickenpox     Depression     Esophageal reflux     Functional dyspepsia     Headache(784.0)     HPV (human papilloma virus) anogenital infection     Hyperlipidemia     Syncope     Unspecified constipation     Unspecified sleep apnea      Past Surgical History:    Past Surgical History:   Procedure Laterality Date    BREAST SURGERY Left     cyst removed    COLONOSCOPY N/A 05/14/2014    RECTAL BIOPSY, normal mucosa    COLPOSCOPY  2017    INTRAUTERINE DEVICE INSERTION  05/13/2015    Mirena    [place] COLPOSCOPY,CERVIX W/ADJ VAG,W/LOOP BX N/A 7/5/2018    LEEP performed by Timbo Manriquez DO at [facility]  12/10/2014    Small hiatal hernia. esophageal ring     WISDOM TOOTH EXTRACTION       Current Medications:   Current Outpatient Medications   Medication Sig Dispense Refill    fluconazole (DIFLUCAN) 150 MG tablet Take 1 tablet by mouth every 72 hours 2 tablet 0    atorvastatin (LIPITOR) 40 MG tablet TAKE 1 TABLET BY MOUTH DAILY 90 tablet 3    folic acid (FOLVITE) 1 MG tablet TAKE 1 TABLET BY MOUTH DAILY 90 tablet 1    amitriptyline (ELAVIL) 10 MG tablet       amitriptyline (ELAVIL) 25 MG tablet       triamcinolone (KENALOG) 0.1 % ointment       Lactobacillus (ACIDOPHILUS/L-SPOROGENES) TABS Bacillus coagulans / Lactobacillus acidophilus Lactobacillus (ACIDOPHILUS/L-SPOROGENES) TABS Indications: Dyspareunia, female , Vulvar irritation , Abnormal skin of vulva TAKE 1 TABLET BY MOUTH DAILY 30 tablet 5 02/13/2020 Active 02- Rell (13109)      naproxen (NAPROSYN) 500 MG tablet Take 1 tablet by mouth 2 times DO Morales        methylPREDNISolone acetate (DEPO-MEDROL) injection 40 mg  40 mg Intra-articular Once Kenyon Brittle, DO         Allergies: Other; Ibuprofen; and Pollen extract    Social History:   Social History     Socioeconomic History    Marital status: Single     Spouse name: None    Number of children: None    Years of education: None    Highest education level: None   Occupational History    None   Social Needs    Financial resource strain: None    Food insecurity     Worry: None     Inability: None    Transportation needs     Medical: None     Non-medical: None   Tobacco Use    Smoking status: Never Smoker    Smokeless tobacco: Never Used   Substance and Sexual Activity    Alcohol use: Yes     Alcohol/week: 0.0 standard drinks     Comment: occasional    Drug use: No    Sexual activity: Yes     Partners: Male     Birth control/protection: I.U.D.    Lifestyle    Physical activity     Days per week: None     Minutes per session: None    Stress: None   Relationships    Social connections     Talks on phone: None     Gets together: None     Attends Anabaptist service: None     Active member of club or organization: None     Attends meetings of clubs or organizations: None     Relationship status: None    Intimate partner violence     Fear of current or ex partner: None     Emotionally abused: None     Physically abused: None     Forced sexual activity: None   Other Topics Concern    None   Social History Narrative    None     Family History:  Family History   Problem Relation Age of Onset    Parkinsonism Other     Diabetes Mother     Cancer Mother         Breast    Hypertension Mother     Other Mother         pacemaker    Heart Attack Mother     Arthritis Father     Heart Disease Maternal Grandmother     Diabetes Maternal Grandmother     Cancer Maternal Grandfather     Diabetes Paternal Grandmother     Heart Disease Paternal Grandmother     Diabetes Paternal Grandfather     Heart Disease Paternal Grandfather      Vitals:   /71 (Site: Right Upper Arm)   Pulse 90   Ht 5' 8\" (1.727 m)   Wt 198 lb 9.6 oz (90.1 kg)   BMI 30.20 kg/m²  Body mass index is 30.2 kg/m². Physical Examination:     Orthopedics:    GENERAL: Alert and oriented X3 in no acute distress. SKIN: Intact without lesions or ulcerations. NEURO: Intact to sensory and motor testing. VASC: Capillary refill is less than 3 seconds. Bilateral Hip     GEN: Alert and oriented X 3, in no acute distress. GAIT: The patient's gait was observed while entering the exam room and was noted to be non antalgic. The extremity is in anatomic alignment. SKIN: Intact without rashes, lesions, or ulcerations. No obvious deformity or swelling. NEURO: The patient responds to light touch throughout right LE. Patellar and Achilles reflexes are 2/4. VASC: The right LE is neurovascularly intact with 2/4 DP and 2/4 PT pulses. Brisk capillary refill. ROM: Bilateral: 95 degrees flexion, 45 degrees abduction, 30 degrees adduction, 10 degrees of internal rotation on the right hip and 15 degrees internal rotation in the left hip, 45 degrees of external rotation. Pop and crunch noted with FADIR. MUSC: Strength is 5/5 flexion, abduction, internal rotation, external rotation. PALP: The patient is tender to palpation over the greater trochanter. TEST: Log roll is (+) in the right hip. No apparent leg length discrepancy. (+) Stenchfield test on the right. (+) Impingement test bilaterally. Negative Trendelenburg test. Negative Jadiel's test. No labral clunks. No pain on compression. Assessment:     1. Femoroacetabular impingement of both hips    2. Femoroacetabular impingement of right hip    3.  Greater trochanteric bursitis, unspecified laterality      Procedures:    Procedure: yes    Greater Trochanteric Bursa Injection    Location: Bilateral Hip  Procedure: Shawna Colón agreed today for a Corticosteroid injection into the bilateral greater trochanteric bursa. The patient was placed in the lateral position with the affected side up. The point of the maximum tenderness was marked with the closed end of a click type pen. The skin was prepped with betadine in a sterile fashion. Utilizing sterile technique a 5 cc solution containing 4cc of 1% Lidocaine and 1 cc containing 40mg of Depo-medrol was injected into the greater trochanteric bursa with a 20 gu. spinal needle. The wound was cleansed and a Band-Aid was placed. The patient tolerated the procedure without difficulty. Adverse reactions of the injection was discussed with the patient including signs of infection (increasing pain, redness, swelling) and the patient was instructed to call immediately with any of these symptoms. Radiology:   X-ray was reviewed from 02/20/2020.  --------------------------------------------------------------------------------------------------------------------  Mri Hip Right W Contrast    Result Date: 6/25/2020  1. Shallow undersurface tear of the anterosuperior right acetabular labrum. No paralabral cyst formation. 2. No acute fracture or dislocation. No right femoral head AVN. 3. Trace fluid in the right greater trochanteric bursa. --------------------------------------------------------------------------------------------------------------------  Ir Inj Arthrogram Hip Right    Result Date: 6/25/2020  Successful fluoroscopic-guided right hip arthrogram. Patient was transferred to MRI for further imaging. Plan:   Treatment : I reviewed the X-ray and MRI with the patient and I informed them that the right hip has a shallow undersurface tear of the anterosuperior right acetabular labrum. We discussed the etiologies and natural histories of Femoroacetabular impingement with labral tear in the right hip and Femoroacetabular impingement in the left hip.  We discussed the various treatment alternatives including anti-inflammatory medications, physical therapy, injections, further imaging studies and as a last result surgery. During today's visit, I explained to the patient that we can inject the greater trochanter to help her get good pain relief or we can inject intraarticular joint capsule if she would like today but I do not know if it will alleviate all of her pain. With that being said, I informed her that there is a surgery we can do as well where I would detach the labrum ring and grind off the bone that is pinching and then I would reattach the labrum to help her get good pain relief. I then asked the patient if the hips are bad enough for a surgery and she stated that the pain is not that bad. I then explained to her we will end her to physical therapy to help strengthen the hip and to stretch the IT band. I then asked her if the latertal aspect of the hip is enough pain at this time, to inject it and she stated that it is. So at this time, the patient has opted for a physical therapy prescription and a cortisone injection into the greater trochanteric bursa of the bilateral hip to help reduce inflammation and pain. The injection site should never get red, hot, or swollen and if it does the patient will contact our office right away. The patient may experience a increase in soreness the first 24-48 hours due to a cortisone flair and can take anti-inflammatories for a short period of time to reduce that soreness. The patient should not submerge the injection site in water for a minimum of 24 hours to avoid infection. This means no lakes, pools, ponds, or hot tubs for 24 hours. If the patient is diabetic the injection may increase their blood sugar for up to one week. The patient can do this cortisone injection once every 3 months as needed. If the injections stop working and do not give the patient relief the patient should consider surgical interventions to produce long term relief. A physical therapy prescription was given.  I also told the patient that she is okay to do Roxie but she should be careful with jumping or cutting. Patient should return to the clinic in 6 weeks to follow up with Razia Azul PA-C. The patient will call the office immediately with any problems. Orders Placed This Encounter   Medications    lidocaine 1 % injection 8 mL    methylPREDNISolone acetate (DEPO-MEDROL) injection 40 mg    methylPREDNISolone acetate (DEPO-MEDROL) injection 40 mg     Orders Placed This Encounter   Procedures    External Referral To Physical Therapy     Referral Priority:   Routine     Referral Type:   Eval and Treat     Referral Reason:   Specialty Services Required     Requested Specialty:   Physical Therapy     Number of Visits Requested:   1     Beatriz CHRISTIE am scribing for and in the presence of Kristofer Roy D.O. 7/9/2020  1:44 PM      I, Kristofer Roy DO, have personally seen this patient and I have reviewed the CC, PMH, FHX and Social History as provided by other clinical staff. I reassessed the HPI and ROS as scribed by Bj Rothman in my presence and it is both accurate and complete. Thereafter, I personally performed the PE, reviewed the imaging and established the DX and POC. I agree with the documentation provided by the Medical Scribe. I have reviewed all documentation in its entirety prior to providing my signature indicating agreement. Any areas of disagreement are noted on the chart.     Electronically signed by Tonya Dillon DO on 7/9/2020 at 1:44 PM        Electronically signed by Tonya Dillon DO, on 7/9/2020 at 1:44 PM

## 2020-07-13 RX ORDER — ERGOCALCIFEROL 1.25 MG/1
50000 CAPSULE ORAL WEEKLY
Qty: 6 CAPSULE | Refills: 0 | Status: SHIPPED | OUTPATIENT
Start: 2020-07-13 | End: 2020-09-24

## 2020-07-30 RX ORDER — FOLIC ACID 1 MG/1
1 TABLET ORAL DAILY
Qty: 90 TABLET | Refills: 1 | Status: SHIPPED | OUTPATIENT
Start: 2020-07-30 | End: 2022-10-06

## 2020-09-17 ENCOUNTER — OFFICE VISIT (OUTPATIENT)
Dept: ORTHOPEDIC SURGERY | Age: 40
End: 2020-09-17
Payer: COMMERCIAL

## 2020-09-17 VITALS
WEIGHT: 198 LBS | BODY MASS INDEX: 30.01 KG/M2 | HEIGHT: 68 IN | SYSTOLIC BLOOD PRESSURE: 118 MMHG | DIASTOLIC BLOOD PRESSURE: 78 MMHG | HEART RATE: 106 BPM

## 2020-09-17 PROCEDURE — 20610 DRAIN/INJ JOINT/BURSA W/O US: CPT | Performed by: ORTHOPAEDIC SURGERY

## 2020-09-17 PROCEDURE — 1036F TOBACCO NON-USER: CPT | Performed by: ORTHOPAEDIC SURGERY

## 2020-09-17 PROCEDURE — G8427 DOCREV CUR MEDS BY ELIG CLIN: HCPCS | Performed by: ORTHOPAEDIC SURGERY

## 2020-09-17 PROCEDURE — 99213 OFFICE O/P EST LOW 20 MIN: CPT | Performed by: ORTHOPAEDIC SURGERY

## 2020-09-17 PROCEDURE — G8417 CALC BMI ABV UP PARAM F/U: HCPCS | Performed by: ORTHOPAEDIC SURGERY

## 2020-09-17 ASSESSMENT — ENCOUNTER SYMPTOMS
CONSTIPATION: 0
COLOR CHANGE: 0
ABDOMINAL DISTENTION: 0
APNEA: 0
ABDOMINAL PAIN: 0
COUGH: 0
NAUSEA: 0
SHORTNESS OF BREATH: 0
DIARRHEA: 0
VOMITING: 0
CHEST TIGHTNESS: 0

## 2020-09-17 NOTE — PROGRESS NOTES
[address] AND SP[facility]TS MEDICINE  [address] [place] [address]  Dept: [phone]  Dept Fax: [phone]        Right Hip Office Visit    Subjective:     Chief Complaint   Patient presents with    Hip Pain     right hip pain, Greater Troch. Bursa Injection- 7/9/20     HPI:     Madeline Salcido who is a , presents with a 2 year history of pain in the right hip. The pain does not radiate into the thigh but it is present in the groin. The pain is present over the lateral aspect of the hip. The pain is most troublesome during ambulatory activity and now limits the patient`s walking distance to shorter distances. Night pain, which disturbs the patient`s sleep is a problem. The patient has had to give up some activities such as exercising, lying down on her side at night and standing for long periods of time, which has produced a consequent deterioration in quality of life. She has tried Chiropractic medicine, a heating pad, home exercises, naproxen and reduction of activity and reports no improvement. Back pain is mildly present but is tolerable and does not interfere with the patient`s life as does the hip. The patient has had a cortisone injection into the greater trochanteric bursa on 07/09/2020 and she had good pain relief for about 2-3 weeks but then the pain came back. The patient has tried physical therapy and she states that it did not do much for the pain. The patient has not had surgery. The opposite hip is okay. Knee pain is not noted. Patient states that the hip is still in pain today but she was still able to do her [employer] classes. She also states that she has began cardio drumming as well but she backs off, anytime the hip flairs up. ROS:     Review of Systems   Constitutional: Positive for activity change. Negative for appetite change, fatigue and fever.    Respiratory: Negative for apnea, cough, chest tightness and shortness of breath. Cardiovascular: Negative for chest pain, palpitations and leg swelling. Gastrointestinal: Negative for abdominal distention, abdominal pain, constipation, diarrhea, nausea and vomiting. Genitourinary: Negative for difficulty urinating, dysuria and hematuria. Musculoskeletal: Positive for arthralgias. Negative for gait problem, joint swelling and myalgias. Skin: Negative for color change and rash. Neurological: Negative for dizziness, weakness, numbness and headaches. Psychiatric/Behavioral: Negative for sleep disturbance.      Past Medical History:    Past Medical History:   Diagnosis Date    Acne     Allergic rhinitis     Chickenpox     Depression     Esophageal reflux     Functional dyspepsia     Headache(784.0)     HPV (human papilloma virus) anogenital infection     Hyperlipidemia     Syncope     Unspecified constipation     Unspecified sleep apnea        Past Surgical History:    Past Surgical History:   Procedure Laterality Date    BREAST SURGERY Left     cyst removed    COLONOSCOPY N/A 05/14/2014    RECTAL BIOPSY, normal mucosa    COLPOSCOPY  2017    INTRAUTERINE DEVICE INSERTION  05/13/2015    Mirena    RI COLPOSCOPY,CERVIX W/ADJ VAG,W/LOOP BX N/A 7/5/2018    LEEP performed by Kai Reyna DO at [facility] [place] [facility] ENDOSCOPY  12/10/2014    Small hiatal hernia. esophageal ring     WISDOM TOOTH EXTRACTION         CurrentMedications:   Current Outpatient Medications   Medication Sig Dispense Refill    folic acid (FOLVITE) 1 MG tablet TAKE 1 TABLET BY MOUTH DAILY 90 tablet 1    vitamin D (ERGOCALCIFEROL) 1.25 MG (22012 UT) CAPS capsule Take 1 capsule by mouth once a week 6 capsule 0    fluconazole (DIFLUCAN) 150 MG tablet Take 1 tablet by mouth every 72 hours 2 tablet 0    atorvastatin (LIPITOR) 40 MG tablet TAKE 1 TABLET BY MOUTH DAILY 90 tablet 3    amitriptyline (ELAVIL) 10 MG tablet       amitriptyline (ELAVIL) 25 mouth nightly Per [facility] for vaginal itching) 90 tablet 1     Current Facility-Administered Medications   Medication Dose Route Frequency Provider Last Rate Last Dose    methylPREDNISolone acetate (DEPO-MEDROL) injection 40 mg  40 mg Intramuscular Once Cydne Ditty, DO        lidocaine 1 % injection 4 mL  4 mL Intradermal Once Cydne Ditty, DO         Allergies: Other; Ibuprofen; and Pollen extract    Social History:   Social History     Socioeconomic History    Marital status: Single     Spouse name: None    Number of children: None    Years of education: None    Highest education level: None   Occupational History    None   Social Needs    Financial resource strain: None    Food insecurity     Worry: None     Inability: None    Transportation needs     Medical: None     Non-medical: None   Tobacco Use    Smoking status: Never Smoker    Smokeless tobacco: Never Used   Substance and Sexual Activity    Alcohol use: Yes     Alcohol/week: 0.0 standard drinks     Comment: occasional    Drug use: No    Sexual activity: Yes     Partners: Male     Birth control/protection: I.U.D.    Lifestyle    Physical activity     Days per week: None     Minutes per session: None    Stress: None   Relationships    Social connections     Talks on phone: None     Gets together: None     Attends Sabianism service: None     Active member of club or organization: None     Attends meetings of clubs or organizations: None     Relationship status: None    Intimate partner violence     Fear of current or ex partner: None     Emotionally abused: None     Physically abused: None     Forced sexual activity: None   Other Topics Concern    None   Social History Narrative    None     Family History:  Family History   Problem Relation Age of Onset    Parkinsonism Other     Diabetes Mother     Cancer Mother         Breast    Hypertension Mother     Other Mother         pacemaker    Heart Attack Mother     Arthritis Father  Heart Disease Maternal Grandmother     Diabetes Maternal Grandmother     Cancer Maternal Grandfather     Diabetes Paternal Grandmother     Heart Disease Paternal Grandmother     Diabetes Paternal Grandfather     Heart Disease Paternal Grandfather      Vitals:   /78   Pulse 106   Ht 5' 8\" (1.727 m)   Wt 198 lb (89.8 kg)   BMI 30.11 kg/m²  Body mass index is 30.11 kg/m². Physical Examination:     Orthopedics:    GENERAL: Alert and oriented X3 in no acute distress. SKIN: Intact without lesions or ulcerations. NEURO: Intact to sensory and motor testing. VASC: Capillary refill is less than 3 seconds. Right Hip     GEN: Alert and oriented X 3, in no acute distress. GAIT: The patient's gait was observed while entering the exam room and was noted to be non antalgic. The extremity is in anatomic alignment. SKIN: Intact without rashes, lesions, or ulcerations. No obvious deformity or swelling. NEURO: The patient responds to light touch throughout right LE. Patellar and Achilles reflexes are 2/4. VASC: The right LE is neurovascularly intact with 2/4 DP and 2/4 PT pulses. Brisk capillary refill. ROM: 95 degrees flexion, 70 degrees abduction, 30 degrees adduction, 20 degrees of internal rotation, 30 degrees of external rotation. Pain with FADIR, Less pain with AYO  MUSC: Strength is 5/5 flexion, abduction, internal rotation, external rotation. PALP: The patient is tender to palpation over the greater trochanter. Right SI joint pain noted. TEST: Log roll is (+). No apparent leg length discrepancy. (+) Stenchfield test. Negative Trendelenburg test. Negative Jadiel's test. No labral clunks. No pain on compression. Assessment:     1. Femoroacetabular impingement of right hip      Procedures:    Procedure: yes    INTRA-ARTICULAR HIP INJECTION    Location: Right Hip  Procedure: Jennifer Jane agreed today for a Corticosteroid injection into the right hip joint.   The patient was placed in the lateral position with the affected side up. Using an ultrasound for precise placement with the patient supine, the hip joint is found. Vascular settings on the ultrasound are used to identify the neurovascular in the groin. Utilizing sterile technique a 5 cc solution containing 4cc of 1% Lidocaine and 1 cc containing 40mg of depomedrol was injected into the hip joint with a 22 gu. spinal needle. The wound was cleansed and a Band-Aid was placed. The patient tolerated the procedure without difficulty. Adverse reactions of the injection was discussed with the patient including signs of infection (increasing pain, redness, swelling) and the patient was instructed to call immediately with any of these symptoms. Radiology:   X-ray was reviewed from 02/20/2020. MRI was reviewed from 06/25/2020. Plan:   Treatment : I reviewed the X-ray and MRI with the patient. We discussed the etiologies and natural histories of hip impingement in the right hip. We discussed the various treatment alternatives including anti-inflammatory medications, physical therapy, injections, further imaging studies and as a last result surgery. During today's visit, I explained to the patient that she may have a pre-arthritic condition called hip impingement and I feel that may be the reason behind her pain. I then informed her that we can do a surgery to rectify the condition but since she informed me previously that she did not want to do the surgery, I feel it would be rather advantageous for us to try another cortisone injection into the intra-articular joint space of the right hip to try and see if she gets good pain relief. I then asked her if the pain is bad enough for us to try another cortisone injection today and the patient stated that the hip is in enough pain and she is willing to try it.  I then told her that I will have my PA inject the area and then she should track how much pain relief she gets after the injection and personally performed the PE, reviewed the imaging and established the DX and POC. I agree with the documentation provided by the Medical Scribe. I have reviewed all documentation in its entirety prior to providing my signature indicating agreement. Any areas of disagreement are noted on the chart.     Electronically signed by Lena Cueto DO on 9/19/2020 at 4:13 PM        Electronically signed by Lena Cueto DO, on 9/19/2020 at 4:13 PM

## 2020-09-18 RX ORDER — LIDOCAINE HYDROCHLORIDE 10 MG/ML
4 INJECTION, SOLUTION INFILTRATION; PERINEURAL ONCE
Status: COMPLETED | OUTPATIENT
Start: 2020-09-18 | End: 2020-09-22

## 2020-09-18 RX ORDER — METHYLPREDNISOLONE ACETATE 40 MG/ML
40 INJECTION, SUSPENSION INTRA-ARTICULAR; INTRALESIONAL; INTRAMUSCULAR; SOFT TISSUE ONCE
Status: COMPLETED | OUTPATIENT
Start: 2020-09-18 | End: 2020-09-22

## 2020-09-22 RX ADMIN — LIDOCAINE HYDROCHLORIDE 4 ML: 10 INJECTION, SOLUTION INFILTRATION; PERINEURAL at 10:46

## 2020-09-22 RX ADMIN — METHYLPREDNISOLONE ACETATE 40 MG: 40 INJECTION, SUSPENSION INTRA-ARTICULAR; INTRALESIONAL; INTRAMUSCULAR; SOFT TISSUE at 10:46

## 2020-09-24 RX ORDER — ERGOCALCIFEROL 1.25 MG/1
50000 CAPSULE ORAL WEEKLY
Qty: 6 CAPSULE | Refills: 0 | Status: SHIPPED | OUTPATIENT
Start: 2020-09-24 | End: 2020-11-30

## 2020-09-24 NOTE — TELEPHONE ENCOUNTER
Last OARRS Review-0  Last Office Visit-01/30/2020  Return To Office-3 months   Next Appointment Date-  Last Refill Date-07/13/2020  90 tabs   Number of Refills Given- 0    Health Maintenance   Topic Date Due    Varicella vaccine (1 of 2 - 2-dose childhood series) 12/28/1981    Flu vaccine (1) 09/01/2020    Lipid screen  02/06/2021    Potassium monitoring  03/09/2021    Creatinine monitoring  03/09/2021    DTaP/Tdap/Td vaccine (2 - Td) 10/16/2024    Cervical cancer screen  02/27/2025    HIV screen  Completed    Hepatitis A vaccine  Aged Out    Hepatitis B vaccine  Aged Out    Hib vaccine  Aged Out    Meningococcal (ACWY) vaccine  Aged Out    Pneumococcal 0-64 years Vaccine  Aged Out             (applicable per patient's age: Cancer Screenings, Depression Screening, Fall Risk Screening, Immunizations)    LDL Cholesterol (mg/dL)   Date Value   02/06/2020 86     AST (U/L)   Date Value   02/06/2020 14     ALT (U/L)   Date Value   02/06/2020 20     BUN (mg/dL)   Date Value   02/06/2020 16      (goal A1C is < 7)   (goal LDL is <100) need 30-50% reduction from baseline     BP Readings from Last 3 Encounters:   09/17/20 118/78   07/09/20 115/71   03/17/20 110/70    (goal /80)      All Future Testing planned in [facility]:  Lab Frequency Next Occurrence   Insulin-Like Growth Factor Once 02/13/2020   Vitamin D 25 Hydroxy Once 05/10/2020   Luteinizing Hormone Once 10/54/8038   Follicle Stimulating Hormone Once 02/10/2020   PAP Smear Once 02/27/2020       Next Visit Date:  Future Appointments   Date Time Provider Ras Rodas   10/29/2020  9:10 AM Danae Espinoza, Harsha Parsons            Patient Active Problem List:     Allergic rhinitis     GERD (gastroesophageal reflux disease)     Migraine without aura     Constipation     Mild episode of recurrent major depressive disorder (HCC)     Anxiety     Hyperlipidemia     High risk HPV infection     Vasovagal syncope     Lichen sclerosus     Dysplasia of

## 2020-10-06 RX ORDER — L. ACIDOPHILUS/LACTOBAC SPOR 35MM-25MM
TABLET ORAL
Qty: 30 TABLET | Refills: 5 | Status: SHIPPED | OUTPATIENT
Start: 2020-10-06 | End: 2021-06-15

## 2020-10-28 RX ORDER — NAPROXEN 500 MG/1
500 TABLET ORAL 2 TIMES DAILY WITH MEALS
Qty: 60 TABLET | Refills: 5 | Status: SHIPPED | OUTPATIENT
Start: 2020-10-28 | End: 2022-02-24 | Stop reason: SDUPTHER

## 2020-10-29 ENCOUNTER — OFFICE VISIT (OUTPATIENT)
Dept: ORTHOPEDIC SURGERY | Age: 40
End: 2020-10-29
Payer: COMMERCIAL

## 2020-10-29 VITALS
WEIGHT: 198 LBS | BODY MASS INDEX: 30.01 KG/M2 | HEIGHT: 68 IN | DIASTOLIC BLOOD PRESSURE: 79 MMHG | TEMPERATURE: 97.3 F | HEART RATE: 97 BPM | SYSTOLIC BLOOD PRESSURE: 122 MMHG

## 2020-10-29 PROCEDURE — G8484 FLU IMMUNIZE NO ADMIN: HCPCS | Performed by: ORTHOPAEDIC SURGERY

## 2020-10-29 PROCEDURE — G8417 CALC BMI ABV UP PARAM F/U: HCPCS | Performed by: ORTHOPAEDIC SURGERY

## 2020-10-29 PROCEDURE — G8427 DOCREV CUR MEDS BY ELIG CLIN: HCPCS | Performed by: ORTHOPAEDIC SURGERY

## 2020-10-29 PROCEDURE — 1036F TOBACCO NON-USER: CPT | Performed by: ORTHOPAEDIC SURGERY

## 2020-10-29 PROCEDURE — 99213 OFFICE O/P EST LOW 20 MIN: CPT | Performed by: ORTHOPAEDIC SURGERY

## 2020-10-29 ASSESSMENT — ENCOUNTER SYMPTOMS
APNEA: 0
CONSTIPATION: 0
NAUSEA: 0
RESPIRATORY NEGATIVE: 1
VOMITING: 0
ABDOMINAL DISTENTION: 0
DIARRHEA: 0
ABDOMINAL PAIN: 0
SHORTNESS OF BREATH: 0
COLOR CHANGE: 0
COUGH: 0
CHEST TIGHTNESS: 0

## 2020-10-29 NOTE — PROGRESS NOTES
[facility] [address] AND [facility]TS MEDIC[facility]  [address] [place] [address]  Dept: [phone]  Dept Fax: [phone]        Right Hip Office Visit    Subjective:     Chief Complaint   Patient presents with    Hip Pain     Right hip pain, fall on 10/27/20     HPI:     Anthony Franklin who is a , presents with a 2 year history of pain in the right hip. The pain does not radiate into the thigh but it is present in the groin. The pain is present over the lateral aspect of the hip. The pain is most troublesome during ambulatory activity and now limits the patient`s walking distance to shorter distances. Night pain, which disturbs the patient`s sleep is a problem. The patient has had to give up some activities such as exercising, lying down on her side at night and standing for long periods of time, which has produced a consequent deterioration in quality of life. She has tried Chiropractic medicine, a heating pad, home exercises, naproxen and reduction of activity and reports no improvement. Back pain is mildly present but is tolerable and does not interfere with the patient`s life as does the hip. The patient has had a cortisone injection into the greater trochanteric bursa on 07/09/2020 and she had good pain relief for about 2-3 weeks. She then had an intra-articular hip injection on 9/17/2020 with at least 50% pain relief. The patient has tried physical therapy and she states that it did not do much for the pain. The patient has not had surgery. The opposite hip is okay. Knee pain is not noted. She had a fall at work on [employer]. She states she tripped over a dog blanket and fell and hit her chin. She states she is really sore all over but her hip doesn't feel any different. ROS:     Review of Systems   Constitutional: Positive for activity change. Negative for appetite change, fatigue and fever. Respiratory: Negative.   Negative for apnea, cough, chest tightness and shortness of breath. Cardiovascular: Negative. Negative for chest pain, palpitations and leg swelling. Gastrointestinal: Negative for abdominal distention, abdominal pain, constipation, diarrhea, nausea and vomiting. Genitourinary: Negative for difficulty urinating, dysuria and hematuria. Musculoskeletal: Positive for arthralgias and gait problem. Negative for joint swelling and myalgias. Skin: Negative for color change and rash. Neurological: Negative for dizziness, weakness, numbness and headaches. Psychiatric/Behavioral: Positive for sleep disturbance.        Past Medical History:    Past Medical History:   Diagnosis Date    Acne     Allergic rhinitis     Chickenpox     Depression     Esophageal reflux     Functional dyspepsia     Headache(784.0)     HPV (human papilloma virus) anogenital infection     Hyperlipidemia     Syncope     Unspecified constipation     Unspecified sleep apnea        Past Surgical History:    Past Surgical History:   Procedure Laterality Date    BREAST SURGERY Left     cyst removed    COLONOSCOPY N/A 05/14/2014    RECTAL BIOPSY, normal mucosa    COLPOSCOPY  2017    INTRAUTERINE DEVICE INSERTION  05/13/2015    Mirena    CA COLPOSCOPY,CERVIX W/ADJ VAG,W/LOOP BX N/A 7/5/2018    LEEP performed by Hossein Manriquez DO at [facility] ENDOSCOPY  12/10/2014    Small hiatal hernia. esophageal ring     WISDOM TOOTH EXTRACTION         CurrentMedications:   Current Outpatient Medications   Medication Sig Dispense Refill    naproxen (NAPROSYN) 500 MG tablet TAKE 1 TABLET BY MOUTH 2 TIMES DAILY (WITH MEALS) 60 tablet 5    Lactobacillus (ACIDOPHILUS/L-SPOROGENES) TABS TAKE 1 TABLET BY MOUTH DAILY 30 tablet 5    vitamin D (ERGOCALCIFEROL) 1.25 MG (47788 UT) CAPS capsule Take 1 capsule by mouth once a week 6 capsule 0    folic acid (FOLVITE) 1 MG tablet TAKE 1 TABLET BY MOUTH DAILY 90 tablet 1    fluconazole (DIFLUCAN) 150 MG tablet Take 1 tablet by mouth every 72 hours 2 tablet 0    atorvastatin (LIPITOR) 40 MG tablet TAKE 1 TABLET BY MOUTH DAILY 90 tablet 3    amitriptyline (ELAVIL) 10 MG tablet       amitriptyline (ELAVIL) 25 MG tablet       triamcinolone (KENALOG) 0.1 % ointment       Lactobacillus (PROBIOTIC ACIDOPHILUS PO) Take 1 tablet by mouth daily      clobetasol (TEMOVATE) 0.05 % ointment Apply topically as needed      cyclobenzaprine (FLEXERIL) 10 MG tablet Take 10 mg by mouth as needed      guaiFENesin (MUCINEX) 600 MG extended release tablet Take 600 mg by mouth every 12 hours as needed      levonorgestrel (MIRENA) IUD 52 mg 1 each by Intrauterine route      pantoprazole (PROTONIX) 40 MG tablet Take 40 mg by mouth 2 times daily      clotrimazole-betamethasone (LOTRISONE) 1-0.05 % cream APPLY TOPICALLY TWICE DAILY FOR 3 DAYS THEN DAILY FOR 4 DAYS 45 g 0    fluticasone (FLONASE) 50 MCG/ACT nasal spray 1 spray by Nasal route every morning (before breakfast) 1 Bottle 5    topiramate (TOPAMAX) 50 MG tablet   3    ondansetron (ZOFRAN-ODT) 4 MG disintegrating tablet Take 1 tablet by mouth every 8 hours as needed for Nausea or Vomiting 5 tablet 0    spironolactone (ALDACTONE) 50 MG tablet Take 100 mg by mouth daily   5    SUMAtriptan (IMITREX) 50 MG tablet Take 50 mg by mouth once as needed for Migraine      sucralfate (CARAFATE) 1 GM tablet TAKE 1 TABLET 4 TIMES DAILY (Patient taking differently: 2 times daily ) 120 tablet 1    EPINEPHrine (EPIPEN) 0.3 MG/0.3ML SOAJ injection Inject 0.3 mg into the muscle as needed      fexofenadine (ALLEGRA) 180 MG tablet Take 180 mg by mouth daily.  amitriptyline (ELAVIL) 25 MG tablet Take 1 tablet by mouth nightly (Patient taking differently: Take 60 mg by mouth nightly Per [facility] for vaginal itching) 90 tablet 1     No current facility-administered medications for this visit. Allergies:     Other; Ibuprofen; and Pollen extract    Social History:   Social History     Socioeconomic History    Marital status: Single     Spouse name: None    Number of children: None    Years of education: None    Highest education level: None   Occupational History    None   Social Needs    Financial resource strain: None    Food insecurity     Worry: None     Inability: None    Transportation needs     Medical: None     Non-medical: None   Tobacco Use    Smoking status: Never Smoker    Smokeless tobacco: Never Used   Substance and Sexual Activity    Alcohol use: Yes     Alcohol/week: 0.0 standard drinks     Comment: occasional    Drug use: No    Sexual activity: Yes     Partners: Male     Birth control/protection: I.U.D.    Lifestyle    Physical activity     Days per week: None     Minutes per session: None    Stress: None   Relationships    Social connections     Talks on phone: None     Gets together: None     Attends Tenriism service: None     Active member of club or organization: None     Attends meetings of clubs or organizations: None     Relationship status: None    Intimate partner violence     Fear of current or ex partner: None     Emotionally abused: None     Physically abused: None     Forced sexual activity: None   Other Topics Concern    None   Social History Narrative    None       Family History:  Family History   Problem Relation Age of Onset    Parkinsonism Other     Diabetes Mother     Cancer Mother         Breast    Hypertension Mother     Other Mother         pacemaker    Heart Attack Mother     Arthritis Father     Heart Disease Maternal Grandmother     Diabetes Maternal Grandmother     Cancer Maternal Grandfather     Diabetes Paternal Grandmother     Heart Disease Paternal Grandmother     Diabetes Paternal Grandfather     Heart Disease Paternal Grandfather        Vitals:   /79   Pulse 97   Temp 97.3 °F (36.3 °C)   Ht 5' 8\" (1.727 m)   Wt 198 lb (89.8 kg)   BMI 30.11 kg/m²  Body mass index is 30.11 kg/m². Physical Examination:     Orthopedics:    GENERAL: Alert and oriented X3 in no acute distress. SKIN: Intact without lesions or ulcerations. NEURO: Intact to sensory and motor testing. VASC: Capillary refill is less than 3 seconds. Right Hip     GEN: Alert and oriented X 3, in no acute distress. GAIT: The patient's gait was observed while entering the exam room and was noted to be antalgic. The extremity is in anatomic alignment. SKIN: Intact without rashes, lesions, or ulcerations. No obvious deformity or swelling. NEURO: The patient responds to light touch throughout bilateral LE. Patellar and Achilles reflexes are 2/4. VASC: The bilateral LE is neurovascularly intact with 2/4 DP and 2/4 PT pulses. Brisk capillary refill. ROM: 0 degree flexion contracture, 100 degrees flexion, 60 degrees abduction, 30 degrees adduction, 20 degrees of internal rotation, 45 degrees of external rotation. MUSC: Strength is 5/5 flexion, abduction, internal rotation, external rotation. PALP: The patient is  tender to palpation over the greater trochanter. TEST: Log roll is negative. No apparent leg length discrepancy. negative Stenchfield test. + Impingement test. Negative Trendelenburg test. Negative Jadiel's test. + C sign. No labral clunks. + psoas snap, Uncomfortable with FADIR     Assessment:     1. Femoroacetabular impingement of right hip    2. Tear of right acetabular labrum, subsequent encounter      Procedures:    Procedure: no  Radiology:   HIP/PELVIS X-RAY     AP and standing AP of the pelvis and supine AP and frog leg lateral of the bilateral hip radiographs were obtained today and demonstrate joint spacing is within normal limits and visualized articular surfaces are intact. There is no evidence of fracture, dislocation or other significant abnormality. There may be mild hip dysplasia bilaterally. There is crossover signs indicated of pincer type impingement.       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    COMPARISON:  Right hip arthrogram from 06/25/2020    HISTORY:  ORDERING SYSTEM PROVIDED HISTORY: Bilateral hip pain  TECHNOLOGIST PROVIDED HISTORY:  possible labreal tear  Is the patient pregnant?->No  Reason for Exam: Pt c/o pain in right hip joint radiates around to back and  buttock x 1 1/2 years    66-year-old female who complains of bilateral hip pain    FINDINGS:  BONE MARROW: No signal changes at the right femoral head to suggest right  femoral head AVN. Bone marrow signal intensity within the visualized osseous structures is  within normal limits. HIP JOINT: Moderate contrast in the right hip joint space consistent with  preceding arthrogram.    Right ligamentum teres appears intact. Right femoral head is properly located within the right acetabulum without  clear evidence for acute fracture, dislocation or femoral head flattening. LABRUM: Shallow undersurface tear of the anterosuperior right acetabular  labrum best seen on image 15, series 8 and image 17, series 4. No paralabral  cyst formation evident. BURSAE: Trace fluid in the right greater trochanteric bursa. No fluid in the  right iliopsoas bursa. SCIATIC NERVE: Visualized portions of the proximal right sciatic nerves  demonstrate normal course, contour, and caliber. MUSCLES / TENDONS: Visualized muscles/tendons appear grossly intact without  evidence of tearing. INTRAPELVIC CONTENTS / SOFT TISSUES: Mild edema in the subcutaneous fat at  the lateral aspect of the left hip/thigh. No discrete organized fluid  collection. IMPRESSION:     1. Shallow undersurface tear of the anterosuperior right acetabular labrum. No paralabral cyst formation. 2. No acute fracture or dislocation. chart.    Electronically signed by Rachna Carbajal DO on 11/1/2020 at 8:33 PM        Electronically signed by Tia Andrew PA-C, on 10/29/2020 at 10:14 AM

## 2020-11-27 NOTE — TELEPHONE ENCOUNTER
Left a voice and my chart message for patient to contact office to schedule a follow up visit.        LOV  Same day 03/17/2020  NOV   None   LRF 09/24/2020 6 caps  RF 0  Health Maintenance   Topic Date Due    Varicella vaccine (1 of 2 - 2-dose childhood series) [birth year]/28/1981    Flu vaccine (1) 09/01/2020    Lipid screen  02/06/2021    DTaP/Tdap/Td vaccine (2 - Td) 10/16/2024    Cervical cancer screen  02/27/2025    HIV screen  Completed    Hepatitis A vaccine  Aged Out    Hepatitis B vaccine  Aged Out    Hib vaccine  Aged Out    Meningococcal (ACWY) vaccine  Aged Out    Pneumococcal 0-64 years Vaccine  Aged Out             (applicable per patient's age: Cancer Screenings, Depression Screening, Fall Risk Screening, Immunizations)    LDL Cholesterol (mg/dL)   Date Value   02/06/2020 86     AST (U/L)   Date Value   02/06/2020 14     ALT (U/L)   Date Value   02/06/2020 20     BUN (mg/dL)   Date Value   02/06/2020 16      (goal A1C is < 7)   (goal LDL is <100) need 30-50% reduction from baseline     BP Readings from Last 3 Encounters:   10/29/20 122/79   09/17/20 118/78   07/09/20 115/71    (goal /80)      All Future Testing planned in [facility]:  Lab Frequency Next Occurrence   Insulin-Like Growth Factor Once 02/13/2020   Vitamin D 25 Hydroxy Once 05/10/2020   Luteinizing Hormone Once 43/88/8850   Follicle Stimulating Hormone Once 02/10/2020   PAP Smear Once 02/27/2020       Next Visit Date:  Future Appointments   Date Time Provider Ras Rodas   12/11/2020 11:45 AM Oliver Katz PA-C Sports Med [facility]            Patient Active Problem List:     Allergic rhinitis     GERD (gastroesophageal reflux disease)     Migraine without aura     Constipation     Mild episode of recurrent major depressive disorder (HCC)     Anxiety     Hyperlipidemia     High risk HPV infection     Vasovagal syncope     Lichen sclerosus     Dysplasia of cervix, high grade CAMERON 2     Vulvar intraepithelial neoplasia (NELIDA) grade 1     S/P LEEP 7/5/18 with IUD removal     Left ovarian cyst     Depression     Other fatigue

## 2020-11-30 RX ORDER — ERGOCALCIFEROL 1.25 MG/1
50000 CAPSULE ORAL WEEKLY
Qty: 6 CAPSULE | Refills: 0 | Status: SHIPPED | OUTPATIENT
Start: 2020-11-30 | End: 2021-01-07

## 2020-12-11 ENCOUNTER — OFFICE VISIT (OUTPATIENT)
Dept: ORTHOPEDIC SURGERY | Age: 40
End: 2020-12-11
Payer: COMMERCIAL

## 2020-12-11 VITALS
HEIGHT: 68 IN | BODY MASS INDEX: 30.01 KG/M2 | DIASTOLIC BLOOD PRESSURE: 75 MMHG | WEIGHT: 198 LBS | HEART RATE: 102 BPM | SYSTOLIC BLOOD PRESSURE: 115 MMHG | TEMPERATURE: 98.2 F

## 2020-12-11 PROCEDURE — 20611 DRAIN/INJ JOINT/BURSA W/US: CPT | Performed by: PHYSICIAN ASSISTANT

## 2020-12-11 RX ORDER — METHYLPREDNISOLONE ACETATE 40 MG/ML
40 INJECTION, SUSPENSION INTRA-ARTICULAR; INTRALESIONAL; INTRAMUSCULAR; SOFT TISSUE ONCE
Status: COMPLETED | OUTPATIENT
Start: 2020-12-11 | End: 2020-12-11

## 2020-12-11 RX ORDER — LIDOCAINE HYDROCHLORIDE 10 MG/ML
4 INJECTION, SOLUTION INFILTRATION; PERINEURAL ONCE
Status: COMPLETED | OUTPATIENT
Start: 2020-12-11 | End: 2020-12-11

## 2020-12-11 RX ADMIN — LIDOCAINE HYDROCHLORIDE 4 ML: 10 INJECTION, SOLUTION INFILTRATION; PERINEURAL at 12:35

## 2020-12-11 RX ADMIN — METHYLPREDNISOLONE ACETATE 40 MG: 40 INJECTION, SUSPENSION INTRA-ARTICULAR; INTRALESIONAL; INTRAMUSCULAR; SOFT TISSUE at 12:35

## 2020-12-11 NOTE — PROGRESS NOTES
[facility] AND SPORTS MEDICINE  [address] [place] [id number]  Dept: [phone]  Dept Fax: [phone]          Right Hip Visit - Follow up    Subjective:     Chief Complaint   Patient presents with    Hip Pain     Right hip pain, greater trochanter bursa cortisone- 7/9/20, Intrarticular cortisone- 9/17/20     HPI:     Janessa Feliz presents today for Right hip pain. Patient is here today for a cortisone injection into Right hip. She had her last cortisone injection on 07/09/2020 into the greater trochanteric bursa and on 9/17/2020 into the intra-articular hip joint capsule with good pain relief. Patient states that she is not ready to have surgery at this time and she would like to do the injections instead. I have reviewed the CC, and if not present in this note, I have reviewed in the patient's chart. I agree with the documentation provided by other staff and have reviewed their documentation prior to providing my signature indicating agreement. Vitals:   /75   Pulse 102   Temp 98.2 °F (36.8 °C)   Ht 5' 8\" (1.727 m)   Wt 198 lb (89.8 kg)   BMI 30.11 kg/m²  Body mass index is 30.11 kg/m². Assessment:     1. Femoroacetabular impingement of right hip      Procedure:   Procedure: Yes    INTRA-ARTICULAR HIP INJECTION     Location: Right Hip  Procedure: Janessa Feliz agreed today for a Corticosteroid injection into the right hip joint. The patient was placed in the lateral position with the affected side up. Using an ultrasound for precise placement with the patient supine, the hip joint is found. Vascular settings on the ultrasound are used to identify the neurovascular in the groin. Utilizing sterile technique a 5 cc solution containing 4cc of 1% Lidocaine and 1 cc containing 40mg of depomedrol was injected into the hip joint with a 22 gu. spinal needle. The wound was cleansed and a Band-Aid was placed.  The patient tolerated the procedure without difficulty. Adverse reactions of the injection was discussed with the patient including signs of infection (increasing pain, redness, swelling) and the patient was instructed to call immediately with any of these symptoms  Plan:   Patient should return to the clinic in 4-6 weeks to follow up with Daren Bower PA-C for another cortisone injection into the greater trochanteric bursa if needed. The patient will call the office immediately with any problems. Orders Placed This Encounter   Medications    lidocaine 1 % injection 4 mL    methylPREDNISolone acetate (DEPO-MEDROL) injection 40 mg     No orders of the defined types were placed in this encounter. Radha Jiménez Day V, am scribing for and in the presence of Daren Bower PA-C. 12/11/2020  12:21 PM    I, Daren Bower PA-C, have personally seen this patient, reviewed the chart including history, and imaging if done. I personally  performed the physical exam and obtained any needed additional history. I placed orders, performed or supervised procedures and developed the treatment plan.     Electronically signed by Mau Phillips PA-C, on 12/11/2020 at 12:21 PM      Electronically signed by Mau Phillips PA-C, on 12/11/2020 at 12:21 PM

## 2021-01-07 ENCOUNTER — OFFICE VISIT (OUTPATIENT)
Dept: ORTHOPEDIC SURGERY | Age: 41
End: 2021-01-07
Payer: COMMERCIAL

## 2021-01-07 VITALS
HEART RATE: 89 BPM | TEMPERATURE: 97.8 F | BODY MASS INDEX: 30.01 KG/M2 | WEIGHT: 198 LBS | HEIGHT: 68 IN | RESPIRATION RATE: 12 BRPM

## 2021-01-07 DIAGNOSIS — M70.60 GREATER TROCHANTERIC BURSITIS, UNSPECIFIED LATERALITY: ICD-10-CM

## 2021-01-07 DIAGNOSIS — M25.851 FEMOROACETABULAR IMPINGEMENT OF BOTH HIPS: Primary | ICD-10-CM

## 2021-01-07 DIAGNOSIS — M25.852 FEMOROACETABULAR IMPINGEMENT OF BOTH HIPS: Primary | ICD-10-CM

## 2021-01-07 PROCEDURE — G8484 FLU IMMUNIZE NO ADMIN: HCPCS | Performed by: PHYSICIAN ASSISTANT

## 2021-01-07 PROCEDURE — 20611 DRAIN/INJ JOINT/BURSA W/US: CPT | Performed by: PHYSICIAN ASSISTANT

## 2021-01-07 PROCEDURE — 1036F TOBACCO NON-USER: CPT | Performed by: PHYSICIAN ASSISTANT

## 2021-01-07 PROCEDURE — G8417 CALC BMI ABV UP PARAM F/U: HCPCS | Performed by: PHYSICIAN ASSISTANT

## 2021-01-07 PROCEDURE — 99212 OFFICE O/P EST SF 10 MIN: CPT | Performed by: PHYSICIAN ASSISTANT

## 2021-01-07 PROCEDURE — G8427 DOCREV CUR MEDS BY ELIG CLIN: HCPCS | Performed by: PHYSICIAN ASSISTANT

## 2021-01-07 RX ORDER — METHYLPREDNISOLONE ACETATE 40 MG/ML
40 INJECTION, SUSPENSION INTRA-ARTICULAR; INTRALESIONAL; INTRAMUSCULAR; SOFT TISSUE ONCE
Status: COMPLETED | OUTPATIENT
Start: 2021-01-07 | End: 2021-01-07

## 2021-01-07 RX ORDER — LIDOCAINE HYDROCHLORIDE 10 MG/ML
4 INJECTION, SOLUTION INFILTRATION; PERINEURAL ONCE
Status: COMPLETED | OUTPATIENT
Start: 2021-01-07 | End: 2021-01-07

## 2021-01-07 RX ADMIN — LIDOCAINE HYDROCHLORIDE 4 ML: 10 INJECTION, SOLUTION INFILTRATION; PERINEURAL at 13:03

## 2021-01-07 RX ADMIN — METHYLPREDNISOLONE ACETATE 40 MG: 40 INJECTION, SUSPENSION INTRA-ARTICULAR; INTRALESIONAL; INTRAMUSCULAR; SOFT TISSUE at 13:05

## 2021-01-07 RX ADMIN — LIDOCAINE HYDROCHLORIDE 4 ML: 10 INJECTION, SOLUTION INFILTRATION; PERINEURAL at 13:02

## 2021-01-07 RX ADMIN — METHYLPREDNISOLONE ACETATE 40 MG: 40 INJECTION, SUSPENSION INTRA-ARTICULAR; INTRALESIONAL; INTRAMUSCULAR; SOFT TISSUE at 13:06

## 2021-01-07 ASSESSMENT — ENCOUNTER SYMPTOMS
VOMITING: 0
ABDOMINAL PAIN: 0
DIARRHEA: 0
COLOR CHANGE: 0
RESPIRATORY NEGATIVE: 1
CHEST TIGHTNESS: 0
ABDOMINAL DISTENTION: 0
CONSTIPATION: 0
SHORTNESS OF BREATH: 0
COUGH: 0
NAUSEA: 0
APNEA: 0

## 2021-01-07 NOTE — PROGRESS NOTES
[facility] [address] AND SPORTS MEDICINE  [address] [place] [id number]  Dept: [phone]  Dept Fax: [phone]        Bilateral Hip Office Visit    Subjective:     Chief Complaint   Patient presents with    Hip Pain     Right Hip     HPI:     Pamela Aguayo who is a , presents with an almost 3 year history of pain in the right hip. The pain does radiate into the thigh and into the groin but it is minimal. The pain is present over the lateral aspect of the hip. The pain is most troublesome during ambulatory activity and now limits the patient`s walking distance to shorter distances. Night pain, which disturbs the patient`s sleep is a problem. The patient has had to give up some activities such as exercising, lying down on her side at night and standing for long periods of time, which has produced a consequent deterioration in quality of life. She has tried heating pad, home exercises, naproxen and reduction of activity and reports little improvement. Back pain is not present and does not interfere with the patient`s life as does the hip. The patient has had a cortisone injection into bilateral greater trochanteric bursa on 7/7/2020. She then returned and had an intra-articular injection on 12/11/2020 with excellent relief. The patient has tried physical therapy with no improvement. She also sees a chiropractor regularly. The patient has not had surgery. The opposite hip is okay. Knee pain is not noted. ROS:     Review of Systems   Constitutional: Positive for activity change. Negative for appetite change, fatigue and fever. Respiratory: Negative. Negative for apnea, cough, chest tightness and shortness of breath. Cardiovascular: Negative. Negative for chest pain, palpitations and leg swelling. Gastrointestinal: Negative for abdominal distention, abdominal pain, constipation, diarrhea, nausea and vomiting. Genitourinary: Negative for difficulty urinating, dysuria and hematuria. Musculoskeletal: Positive for arthralgias and gait problem. Negative for joint swelling and myalgias. Skin: Negative for color change and rash. Neurological: Negative for dizziness, weakness, numbness and headaches. Psychiatric/Behavioral: Positive for sleep disturbance.        Past Medical History:    Past Medical History:   Diagnosis Date    Acne     Allergic rhinitis     Chickenpox     Depression     Esophageal reflux     Functional dyspepsia     Headache(784.0)     HPV (human papilloma virus) anogenital infection     Hyperlipidemia     Syncope     Unspecified constipation     Unspecified sleep apnea        Past Surgical History:    Past Surgical History:   Procedure Laterality Date    BREAST SURGERY Left     cyst removed    COLONOSCOPY N/A 05/14/2014    RECTAL BIOPSY, normal mucosa    COLPOSCOPY  2017    INTRAUTERINE DEVICE INSERTION  05/13/2015    Mirena    KS COLPOSCOPY,CERVIX W/ADJ VAG,W/LOOP BX N/A 7/5/2018    LEEP performed by [facility] DO at [facility] ENDOSCOPY  12/10/2014    Small hiatal hernia. esophageal ring     WISDOM TOOTH EXTRACTION         CurrentMedications:   Current Outpatient Medications   Medication Sig Dispense Refill    vitamin D (ERGOCALCIFEROL) 1.25 MG (95728 UT) CAPS capsule TAKE 1 CAPSULE BY MOUTH ONCE A WEEK 6 capsule 0    naproxen (NAPROSYN) 500 MG tablet TAKE 1 TABLET BY MOUTH 2 TIMES DAILY (WITH MEALS) 60 tablet 5    Lactobacillus (ACIDOPHILUS/L-SPOROGENES) TABS TAKE 1 TABLET BY MOUTH DAILY 30 tablet 5    folic acid (FOLVITE) 1 MG tablet TAKE 1 TABLET BY MOUTH DAILY 90 tablet 1    fluconazole (DIFLUCAN) 150 MG tablet Take 1 tablet by mouth every 72 hours 2 tablet 0    atorvastatin (LIPITOR) 40 MG tablet TAKE 1 TABLET BY MOUTH DAILY 90 tablet 3    amitriptyline (ELAVIL) 10 MG tablet       amitriptyline (ELAVIL) 25 MG tablet  triamcinolone (KENALOG) 0.1 % ointment       Lactobacillus (PROBIOTIC ACIDOPHILUS PO) Take 1 tablet by mouth daily      clobetasol (TEMOVATE) 0.05 % ointment Apply topically as needed      cyclobenzaprine (FLEXERIL) 10 MG tablet Take 10 mg by mouth as needed      guaiFENesin (MUCINEX) 600 MG extended release tablet Take 600 mg by mouth every 12 hours as needed      levonorgestrel (MIRENA) IUD 52 mg 1 each by Intrauterine route      pantoprazole (PROTONIX) 40 MG tablet Take 40 mg by mouth 2 times daily      clotrimazole-betamethasone (LOTRISONE) 1-0.05 % cream APPLY TOPICALLY TWICE DAILY FOR 3 DAYS THEN DAILY FOR 4 DAYS 45 g 0    fluticasone (FLONASE) 50 MCG/ACT nasal spray 1 spray by Nasal route every morning (before breakfast) 1 Bottle 5    amitriptyline (ELAVIL) 25 MG tablet Take 1 tablet by mouth nightly (Patient taking differently: Take 60 mg by mouth nightly Per [facility] for vaginal itching) 90 tablet 1    topiramate (TOPAMAX) 50 MG tablet   3    ondansetron (ZOFRAN-ODT) 4 MG disintegrating tablet Take 1 tablet by mouth every 8 hours as needed for Nausea or Vomiting 5 tablet 0    spironolactone (ALDACTONE) 50 MG tablet Take 100 mg by mouth daily   5    SUMAtriptan (IMITREX) 50 MG tablet Take 50 mg by mouth once as needed for Migraine      sucralfate (CARAFATE) 1 GM tablet TAKE 1 TABLET 4 TIMES DAILY (Patient taking differently: 2 times daily ) 120 tablet 1    EPINEPHrine (EPIPEN) 0.3 MG/0.3ML SOAJ injection Inject 0.3 mg into the muscle as needed      fexofenadine (ALLEGRA) 180 MG tablet Take 180 mg by mouth daily. No current facility-administered medications for this visit. Allergies:     Other, Ibuprofen, and Pollen extract    Social History:   Social History     Socioeconomic History    Marital status: Single     Spouse name: Not on file    Number of children: Not on file    Years of education: Not on file    Highest education level: Not on file   Occupational History  Not on file   Social Needs    Financial resource strain: Not on file    Food insecurity     Worry: Not on file     Inability: Not on file    Transportation needs     Medical: Not on file     Non-medical: Not on file   Tobacco Use    Smoking status: Never Smoker    Smokeless tobacco: Never Used   Substance and Sexual Activity    Alcohol use: Yes     Alcohol/week: 0.0 standard drinks     Comment: occasional    Drug use: No    Sexual activity: Yes     Partners: Male     Birth control/protection: I.U.D. Lifestyle    Physical activity     Days per week: Not on file     Minutes per session: Not on file    Stress: Not on file   Relationships    Social connections     Talks on phone: Not on file     Gets together: Not on file     Attends Jain service: Not on file     Active member of club or organization: Not on file     Attends meetings of clubs or organizations: Not on file     Relationship status: Not on file    Intimate partner violence     Fear of current or ex partner: Not on file     Emotionally abused: Not on file     Physically abused: Not on file     Forced sexual activity: Not on file   Other Topics Concern    Not on file   Social History Narrative    Not on file       Family History:  Family History   Problem Relation Age of Onset    Parkinsonism Other     Diabetes Mother     Cancer Mother         Breast    Hypertension Mother     Other Mother         pacemaker    Heart Attack Mother     Arthritis Father     Heart Disease Maternal Grandmother     Diabetes Maternal Grandmother     Cancer Maternal Grandfather     Diabetes Paternal Grandmother     Heart Disease Paternal Grandmother     Diabetes Paternal Grandfather     Heart Disease Paternal Grandfather        Vitals:   Pulse 89   Temp 97.8 °F (36.6 °C)   Resp 12   Ht 5' 8\" (1.727 m)   Wt 198 lb (89.8 kg)   BMI 30.11 kg/m²  Body mass index is 30.11 kg/m².   Physical Examination:     Orthopedics: GENERAL: Alert and oriented X3 in no acute distress. SKIN: Intact without lesions or ulcerations. NEURO: Intact to sensory and motor testing. VASC: Capillary refill is less than 3 seconds. Bilateral Hip     GEN: Alert and oriented X 3, in no acute distress. GAIT: The patient's gait was observed while entering the exam room and was noted to be non antalgic. The extremity is in anatomic alignment. SKIN: Intact without rashes, lesions, or ulcerations. No obvious deformity or swelling. NEURO: The patient responds to light touch throughout bilateral LE. Patellar and Achilles reflexes are 2/4. VASC: The bilateral LE is neurovascularly intact with 2/4 DP and 2/4 PT pulses. Brisk capillary refill. ROM: 0 degree flexion contracture, 110 degrees flexion, 45 degrees abduction, 30 degrees adduction, 20 degrees of internal rotation, 60 degrees of external rotation. MUSC: Strength is 5/5 flexion, abduction, internal rotation, external rotation. PALP: The patient is  tender to palpation over the greater trochanter. TEST: Log roll is positive with lateral pain. No apparent leg length discrepancy. Negative Stenchfield test.  Positive impingement test. Negative Trendelenburg test. Negative Jadiel's test.  Positive C sign. No labral clunks. No pain on compression. Assessment:     1. Femoroacetabular impingement of both hips    2.  Greater trochanteric bursitis, unspecified laterality      Procedures:    Procedure: yes    Greater Trochanteric Bursa Injection     Location: Bilateral Hip Procedure: Moe Álvarez agreed today for a Corticosteroid injection into the bilateral greater trochanteric bursa. The patient was placed in the lateral position with the affected side up. The point of the maximum tenderness was marked with the closed end of a click type pen. The skin was prepped with betadine in a sterile fashion. Utilizing sterile technique a 5 cc solution containing 4cc of 1% Lidocaine and 1 cc containing 40mg of Depo-medrol was injected into the greater trochanteric bursa with a 20 gu. spinal needle. The wound was cleansed and a Band-Aid was placed. The patient tolerated the procedure without difficulty. Adverse reactions of the injection was discussed with the patient including signs of infection (increasing pain, redness, swelling) and the patient was instructed to call immediately with any of these symptoms. Radiology:   No results found.    Plan: Treatment : We discussed the etiologies and natural histories of greater trochanteric bursitis of the bilateral hips. We discussed the various treatment alternatives including anti-inflammatory medications, physical therapy, injections, further imaging studies and as a last result surgery. During today's visit, we discussed that her left hip is not bothering her much but she is having trouble sleeping on it. She is having significant pain on her right lateral side with sleeping mostly. She is interested in having cortisone injections again into both bursa's. I explained that if we do do the injection into the left bursa we may will knock it out and she may never have an issue with that 1 again. The patient has opted for a cortisone injection into the bilateral greater trochanteric bursa to help reduce inflammation and pain. The injection site should never get red, hot, or swollen and if it does the patient will contact our office right away. The patient may experience a increase in soreness the first 24-48 hours due to a cortisone flair and can take anti-inflammatories for a short period of time to reduce that soreness. The patient should not submerge the injection site in water for a minimum of 24 hours to avoid infection. This means no lakes, pools, ponds, or hot tubs for 24 hours. If the patient is diabetic the injection may increase their blood sugar for up to one week. The patient can do this cortisone injection once every 3 months as needed. If the injections stop working and do not give the patient relief the patient should consider surgical interventions to produce long term relief. A physical therapy prescription was not given but the patient will continue her exercises and stretching on her own. Patient should return to the clinic in 3 months to follow up with Digna Hull PA-C. The patient will call the office immediately with any problems. No orders of the defined types were placed in this encounter. No orders of the defined types were placed in this encounter. Marcus Easley PA-C, have personally seen this patient, reviewed the chart including history, and imaging if done. I personally  performed the physical exam and obtained any needed additional history. I placed orders, performed or supervised procedures and developed the treatment plan.     Electronically signed by Mustapha Vargas PA-C, on 1/7/2021 at 1:45 PM      Electronically signed by Mustapha Vargas PA-C, on 1/7/2021 at 11:08 AM

## 2021-01-19 ENCOUNTER — PATIENT MESSAGE (OUTPATIENT)
Dept: OBGYN CLINIC | Age: 41
End: 2021-01-19

## 2021-01-20 RX ORDER — FLUCONAZOLE 150 MG/1
150 TABLET ORAL ONCE
Qty: 1 TABLET | Refills: 0 | Status: SHIPPED | OUTPATIENT
Start: 2021-01-20 | End: 2021-01-20

## 2021-01-20 NOTE — TELEPHONE ENCOUNTER
From: Genevieve Hu  To: Vignesh Hutchison DO  Sent: 1/19/2021 4:15 PM EST  Subject: Prescription Question    Dr. Charisse France,    For the past couple of days Ive been having symptoms of a yeast infection. Unfortunately I get the symptoms every once in awhile and over the counter products dont seem to help. I was hoping I could get medication dispense to help with it. Thank you.     Genevieve Hu

## 2021-04-08 ENCOUNTER — OFFICE VISIT (OUTPATIENT)
Dept: ORTHOPEDIC SURGERY | Age: 41
End: 2021-04-08
Payer: COMMERCIAL

## 2021-04-08 VITALS
BODY MASS INDEX: 28.19 KG/M2 | DIASTOLIC BLOOD PRESSURE: 69 MMHG | WEIGHT: 186 LBS | SYSTOLIC BLOOD PRESSURE: 107 MMHG | RESPIRATION RATE: 12 BRPM | HEART RATE: 88 BPM | HEIGHT: 68 IN

## 2021-04-08 DIAGNOSIS — M70.60 GREATER TROCHANTERIC BURSITIS, UNSPECIFIED LATERALITY: Primary | ICD-10-CM

## 2021-04-08 PROCEDURE — G8427 DOCREV CUR MEDS BY ELIG CLIN: HCPCS | Performed by: PHYSICIAN ASSISTANT

## 2021-04-08 PROCEDURE — 99212 OFFICE O/P EST SF 10 MIN: CPT | Performed by: PHYSICIAN ASSISTANT

## 2021-04-08 PROCEDURE — G8417 CALC BMI ABV UP PARAM F/U: HCPCS | Performed by: PHYSICIAN ASSISTANT

## 2021-04-08 PROCEDURE — 20611 DRAIN/INJ JOINT/BURSA W/US: CPT | Performed by: PHYSICIAN ASSISTANT

## 2021-04-08 PROCEDURE — 1036F TOBACCO NON-USER: CPT | Performed by: PHYSICIAN ASSISTANT

## 2021-04-08 RX ORDER — LIDOCAINE HYDROCHLORIDE 10 MG/ML
4 INJECTION, SOLUTION INFILTRATION; PERINEURAL ONCE
Status: COMPLETED | OUTPATIENT
Start: 2021-04-08 | End: 2021-04-08

## 2021-04-08 RX ORDER — METHYLPREDNISOLONE ACETATE 40 MG/ML
40 INJECTION, SUSPENSION INTRA-ARTICULAR; INTRALESIONAL; INTRAMUSCULAR; SOFT TISSUE ONCE
Status: COMPLETED | OUTPATIENT
Start: 2021-04-08 | End: 2021-04-08

## 2021-04-08 RX ADMIN — LIDOCAINE HYDROCHLORIDE 4 ML: 10 INJECTION, SOLUTION INFILTRATION; PERINEURAL at 12:43

## 2021-04-08 RX ADMIN — METHYLPREDNISOLONE ACETATE 40 MG: 40 INJECTION, SUSPENSION INTRA-ARTICULAR; INTRALESIONAL; INTRAMUSCULAR; SOFT TISSUE at 12:44

## 2021-04-08 ASSESSMENT — ENCOUNTER SYMPTOMS
VOMITING: 0
SHORTNESS OF BREATH: 0
RESPIRATORY NEGATIVE: 1
CHEST TIGHTNESS: 0
APNEA: 0
CONSTIPATION: 0
ABDOMINAL PAIN: 0
DIARRHEA: 0
COUGH: 0
NAUSEA: 0
ABDOMINAL DISTENTION: 0
COLOR CHANGE: 0

## 2021-04-08 NOTE — PROGRESS NOTES
ROS:     Review of Systems   Constitutional: Positive for activity change. Negative for appetite change, fatigue and fever. Respiratory: Negative. Negative for apnea, cough, chest tightness and shortness of breath. Cardiovascular: Negative. Negative for chest pain, palpitations and leg swelling. Gastrointestinal: Negative for abdominal distention, abdominal pain, constipation, diarrhea, nausea and vomiting. Genitourinary: Negative for difficulty urinating, dysuria and hematuria. Musculoskeletal: Positive for arthralgias and gait problem. Negative for joint swelling and myalgias. Skin: Negative for color change and rash. Neurological: Negative for dizziness, weakness, numbness and headaches. Psychiatric/Behavioral: Positive for sleep disturbance.        Past Medical History:    Past Medical History:   Diagnosis Date    Acne     Allergic rhinitis     Chickenpox     Depression     Esophageal reflux     Functional dyspepsia     Headache(784.0)     HPV (human papilloma virus) anogenital infection     Hyperlipidemia     Syncope     Unspecified constipation     Unspecified sleep apnea        Past Surgical History:    Past Surgical History:   Procedure Laterality Date    BREAST SURGERY Left     cyst removed    COLONOSCOPY N/A 05/14/2014    RECTAL BIOPSY, normal mucosa    COLPOSCOPY  2017    INTRAUTERINE DEVICE INSERTION  05/13/2015    Mirena    CO COLPOSCOPY,CERVIX W/ADJ VAG,W/LOOP BX N/A 7/5/2018    LEEP performed by Yusef Markham DO at [facility] 64 ENDOSCOPY  12/10/2014    Small hiatal hernia. esophageal ring     WISDOM TOOTH EXTRACTION         CurrentMedications:   Current Outpatient Medications   Medication Sig Dispense Refill    vitamin D (ERGOCALCIFEROL) 1.25 MG (73286 UT) CAPS capsule Take 1 capsule by mouth once a week 4 capsule 2    naproxen (NAPROSYN) 500 MG tablet TAKE 1 TABLET BY MOUTH 2 TIMES DAILY (WITH MEALS) 60 tablet 5    Lactobacillus (ACIDOPHILUS/L-SPOROGENES) TABS TAKE 1 TABLET BY MOUTH DAILY 30 tablet 5    folic acid (FOLVITE) 1 MG tablet TAKE 1 TABLET BY MOUTH DAILY 90 tablet 1    atorvastatin (LIPITOR) 40 MG tablet TAKE 1 TABLET BY MOUTH DAILY 90 tablet 3    amitriptyline (ELAVIL) 10 MG tablet       amitriptyline (ELAVIL) 25 MG tablet       triamcinolone (KENALOG) 0.1 % ointment       Lactobacillus (PROBIOTIC ACIDOPHILUS PO) Take 1 tablet by mouth daily      clobetasol (TEMOVATE) 0.05 % ointment Apply topically as needed      cyclobenzaprine (FLEXERIL) 10 MG tablet Take 10 mg by mouth as needed      guaiFENesin (MUCINEX) 600 MG extended release tablet Take 600 mg by mouth every 12 hours as needed      levonorgestrel (MIRENA) IUD 52 mg 1 each by Intrauterine route      pantoprazole (PROTONIX) 40 MG tablet Take 40 mg by mouth 2 times daily      clotrimazole-betamethasone (LOTRISONE) 1-0.05 % cream APPLY TOPICALLY TWICE DAILY FOR 3 DAYS THEN DAILY FOR 4 DAYS 45 g 0    fluticasone (FLONASE) 50 MCG/ACT nasal spray 1 spray by Nasal route every morning (before breakfast) 1 Bottle 5    amitriptyline (ELAVIL) 25 MG tablet Take 1 tablet by mouth nightly (Patient taking differently: Take 60 mg by mouth nightly Per [facility] for vaginal itching) 90 tablet 1    topiramate (TOPAMAX) 50 MG tablet   3    ondansetron (ZOFRAN-ODT) 4 MG disintegrating tablet Take 1 tablet by mouth every 8 hours as needed for Nausea or Vomiting 5 tablet 0    spironolactone (ALDACTONE) 50 MG tablet Take 100 mg by mouth daily   5    SUMAtriptan (IMITREX) 50 MG tablet Take 50 mg by mouth once as needed for Migraine      sucralfate (CARAFATE) 1 GM tablet TAKE 1 TABLET 4 TIMES DAILY (Patient taking differently: 2 times daily ) 120 tablet 1    EPINEPHrine (EPIPEN) 0.3 MG/0.3ML SOAJ injection Inject 0.3 mg into the muscle as needed      fexofenadine (ALLEGRA) 180 MG tablet Take 180 mg by mouth daily.        Current Facility-Administered Medications Medication Dose Route Frequency Provider Last Rate Last Admin    lidocaine 1 % injection 4 mL  4 mL Intra-articular Once Earl Pila, PA-C        methylPREDNISolone acetate (DEPO-MEDROL) injection 40 mg  40 mg Intra-articular Once Earl Pila, PA-C        lidocaine 1 % injection 4 mL  4 mL Intra-articular Once Earl Pila, PA-C        methylPREDNISolone acetate (DEPO-MEDROL) injection 40 mg  40 mg Intra-articular Once Earl Pila, PA-C           Allergies: Other, Ibuprofen, and Pollen extract    Social History:   Social History     Socioeconomic History    Marital status: Single     Spouse name: None    Number of children: None    Years of education: None    Highest education level: None   Occupational History    None   Social Needs    Financial resource strain: None    Food insecurity     Worry: None     Inability: None    Transportation needs     Medical: None     Non-medical: None   Tobacco Use    Smoking status: Never Smoker    Smokeless tobacco: Never Used   Substance and Sexual Activity    Alcohol use: Yes     Alcohol/week: 0.0 standard drinks     Comment: occasional    Drug use: No    Sexual activity: Yes     Partners: Male     Birth control/protection: I.U.D.    Lifestyle    Physical activity     Days per week: None     Minutes per session: None    Stress: None   Relationships    Social connections     Talks on phone: None     Gets together: None     Attends [employer] service: None     Active member of club or organization: None     Attends meetings of clubs or organizations: None     Relationship status: None    Intimate partner violence     Fear of current or ex partner: None     Emotionally abused: None     Physically abused: None     Forced sexual activity: None   Other Topics Concern    None   Social History Narrative    None       Family History:  Family History   Problem Relation Age of Onset    Parkinsonism Other     Diabetes Mother     Cancer bursitis, unspecified laterality      Procedures:    Procedure: yes    Greater Trochanteric Bursa Injection     Procedure: Hank Arguelles agreed today for a Corticosteroid injection into the bilateral greater trochanteric bursa. The patient was placed in the lateral position with the affected side up. The point of the maximum tenderness was marked with the closed end of a click type pen. The skin was prepped with betadine in a sterile fashion. Utilizing a [employer] ultrasound unit with a variable frequency linear transducer and sterile technique a 5 cc solution containing 4cc of 1% Lidocaine and 1 cc containing 40mg of depomedrol was injected into the greater trochanteric bursa with a 22 gauge spinal needle. The wound was cleansed and a Band-Aid was placed. The patient tolerated the procedure without difficulty. Adverse reactions of the injection was discussed with the patient including signs of infection (increasing pain, redness, swelling) and the patient was instructed to call immediately with any of these symptoms. The images are stored on SD card in the machine until downloaded to the patient's chart. Radiology:   No results found. Plan:   Treatment : I reviewed the X-ray. We discussed the etiologies and natural histories of right hip labral tear and greater trochanteric bursitis and left hip greater trochanteric bursitis. We discussed the various treatment alternatives including anti-inflammatory medications, physical therapy, injections, further imaging studies and as a last result surgery. During today's visit, we discussed that she is really considering doing a surgery but needs to prepare to be off work for a significant period of time. So at this time she is not ready. She is also trying to be more active and is having more hip pain. The injections that I did in January into her greater trochanteric bursa's was helpful for many months.   The patient would like to do another cortisone injection at this time. She will follow up with Dr. Myla Chawla when she is ready to schedule surgery. The patient has opted for a cortisone injection into the bilateral greater trochanteric bursa's to help reduce inflammation and pain. The injection site should never get red, hot, or swollen and if it does the patient will contact our office right away. The patient may experience a increase in soreness the first 24-48 hours due to a cortisone flair and can take anti-inflammatories for a short period of time to reduce that soreness. The patient should not submerge the injection site in water for a minimum of 24 hours to avoid infection. This means no lakes, pools, ponds, or hot tubs for 24 hours. If the patient is diabetic the injection may increase their blood sugar for up to one week. The patient can do this cortisone injection once every 3 months as needed. If the injections stop working and do not give the patient relief the patient should consider surgical interventions to produce long term relief. A physical therapy prescription was not given but she states she will begin doing her exercises again that she learned at physical therapy. Patient should return to the clinic in 3 months or as needed to follow up with Joyce HERNANDEZ if she is ready to discuss surgery. If she would like another injection, she can come in and see me. The patient will call the office immediately with any problems. Orders Placed This Encounter   Medications    lidocaine 1 % injection 4 mL    methylPREDNISolone acetate (DEPO-MEDROL) injection 40 mg    lidocaine 1 % injection 4 mL    methylPREDNISolone acetate (DEPO-MEDROL) injection 40 mg       No orders of the defined types were placed in this encounter. Michael Noguera PA-C, have personally seen this patient, reviewed the chart including history, and imaging if done. I personally  performed the physical exam and obtained any needed additional history.  I placed orders, performed or

## 2021-04-15 ENCOUNTER — HOSPITAL ENCOUNTER (OUTPATIENT)
Age: 41
Setting detail: SPECIMEN
Discharge: HOME | End: 2021-04-15
Payer: COMMERCIAL

## 2021-04-15 LAB
ALBUMIN SERPL-MCNC: 4.2 G/DL (ref 3.5–5.2)
ALBUMIN/GLOBULIN RATIO: 1.3 (ref 1–2.5)
ALP BLD-CCNC: 96 U/L (ref 35–104)
ALT SERPL-CCNC: 97 U/L (ref 5–33)
ANION GAP SERPL CALCULATED.3IONS-SCNC: 12 MMOL/L (ref 9–17)
AST SERPL-CCNC: 60 U/L
BILIRUB SERPL-MCNC: 0.25 MG/DL (ref 0.3–1.2)
BUN BLDV-MCNC: 12 MG/DL (ref 6–20)
BUN/CREAT BLD: ABNORMAL (ref 9–20)
CALCIUM SERPL-MCNC: 9.1 MG/DL (ref 8.6–10.4)
CHLORIDE BLD-SCNC: 105 MMOL/L (ref 98–107)
CO2: 22 MMOL/L (ref 20–31)
CREAT SERPL-MCNC: 0.51 MG/DL (ref 0.5–0.9)
GFR AFRICAN AMERICAN: >60 ML/MIN
GFR NON-AFRICAN AMERICAN: >60 ML/MIN
GFR SERPL CREATININE-BSD FRML MDRD: ABNORMAL ML/MIN/{1.73_M2}
GFR SERPL CREATININE-BSD FRML MDRD: ABNORMAL ML/MIN/{1.73_M2}
GLUCOSE BLD-MCNC: 75 MG/DL (ref 70–99)
HCT VFR BLD CALC: 39.9 % (ref 36.3–47.1)
HEMOGLOBIN: 12.2 G/DL (ref 11.9–15.1)
MAGNESIUM: 2 MG/DL (ref 1.6–2.6)
MCH RBC QN AUTO: 27.8 PG (ref 25.2–33.5)
MCHC RBC AUTO-ENTMCNC: 30.6 G/DL (ref 28.4–34.8)
MCV RBC AUTO: 90.9 FL (ref 82.6–102.9)
NRBC AUTOMATED: 0 PER 100 WBC
PDW BLD-RTO: 14.8 % (ref 11.8–14.4)
PLATELET # BLD: 258 K/UL (ref 138–453)
PMV BLD AUTO: 9.7 FL (ref 8.1–13.5)
POTASSIUM SERPL-SCNC: 4.1 MMOL/L (ref 3.7–5.3)
RBC # BLD: 4.39 M/UL (ref 3.95–5.11)
SODIUM BLD-SCNC: 139 MMOL/L (ref 135–144)
TOTAL PROTEIN: 7.5 G/DL (ref 6.4–8.3)
VITAMIN B-12: 833 PG/ML (ref 232–1245)
VITAMIN D 25-HYDROXY: 15.4 NG/ML (ref 30–100)
WBC # BLD: 7.3 K/UL (ref 3.5–11.3)

## 2021-04-18 LAB — ZINC: 81.9 UG/DL (ref 60–120)

## 2021-04-30 ENCOUNTER — TELEPHONE (OUTPATIENT)
Dept: OBGYN CLINIC | Age: 41
End: 2021-04-30

## 2021-04-30 RX ORDER — FLUCONAZOLE 150 MG/1
150 TABLET ORAL ONCE
Qty: 1 TABLET | Refills: 0 | Status: SHIPPED | OUTPATIENT
Start: 2021-04-30 | End: 2021-04-30

## 2021-05-04 ENCOUNTER — TELEPHONE (OUTPATIENT)
Dept: OBGYN CLINIC | Age: 41
End: 2021-05-04

## 2021-05-04 RX ORDER — FLUCONAZOLE 150 MG/1
150 TABLET ORAL ONCE
Qty: 1 TABLET | Refills: 0 | Status: SHIPPED | OUTPATIENT
Start: 2021-05-04 | End: 2021-05-04

## 2021-05-04 NOTE — TELEPHONE ENCOUNTER
Patient called with yeast infection symptoms, she called Friday and got a diflucan. She is still having symptoms, of white discharge,ithcing, discomfort. She was just on antibiotics from PCP. Asking fro 1 more dose of diflucan, and if this does not help she will make an appointment- had no available appts this week to offer patient.

## 2021-06-15 RX ORDER — L. ACIDOPHILUS/LACTOBAC SPOR 35MM-25MM
TABLET ORAL
Qty: 30 TABLET | Refills: 5 | Status: SHIPPED | OUTPATIENT
Start: 2021-06-15 | End: 2022-04-25

## 2021-06-24 ENCOUNTER — OFFICE VISIT (OUTPATIENT)
Dept: OBGYN CLINIC | Age: 41
End: 2021-06-24
Payer: COMMERCIAL

## 2021-06-24 ENCOUNTER — HOSPITAL ENCOUNTER (OUTPATIENT)
Age: 41
Setting detail: SPECIMEN
Discharge: HOME | End: 2021-06-24
Payer: COMMERCIAL

## 2021-06-24 VITALS — BODY MASS INDEX: 28.13 KG/M2 | DIASTOLIC BLOOD PRESSURE: 70 MMHG | SYSTOLIC BLOOD PRESSURE: 126 MMHG | WEIGHT: 185 LBS

## 2021-06-24 DIAGNOSIS — N89.8 VAGINAL DISCHARGE: ICD-10-CM

## 2021-06-24 DIAGNOSIS — N89.8 VAGINAL ITCHING: ICD-10-CM

## 2021-06-24 DIAGNOSIS — Z01.419 VISIT FOR GYNECOLOGIC EXAMINATION: Primary | ICD-10-CM

## 2021-06-24 PROCEDURE — 99396 PREV VISIT EST AGE 40-64: CPT | Performed by: OBSTETRICS & GYNECOLOGY

## 2021-06-25 LAB
DIRECT EXAM: NORMAL
Lab: NORMAL
SPECIMEN DESCRIPTION: NORMAL

## 2021-06-25 ASSESSMENT — ENCOUNTER SYMPTOMS
VOMITING: 0
COLOR CHANGE: 0
COUGH: 0
NAUSEA: 0
RHINORRHEA: 0
DIARRHEA: 1
ABDOMINAL PAIN: 0
CONSTIPATION: 1
BACK PAIN: 0
SHORTNESS OF BREATH: 0

## 2021-06-25 NOTE — PROGRESS NOTES
Hussein Castaneda is a 36 y.o.  here for her annual exam.  The patient was seen and examined. The patients past medical, surgical, social and family history were reviewed. Current medications and allergies were reviewed, and documented in the chart. She goes to [facility] vulvar clinic  She goes to see Dr. Tamir Suazo for hip impingement  She is set up to see endocrinology for cushings    Concerned about right pelvic pain and some vaginal irritation, but this has been a chronic concern and she is established with [facility] vulvar clinic      She is gainfully employed as  at [employer]. She is single. Exercise No  Diet Yes  Tobacco abuse No    Last PAP: May 2018 was positive ASCUS and HPV 16 had colp then  had leep in  ASCUS +HR HPV   negative + HR HPV  Normal colposcopy 2019      Sexually active: no- 3 years ago, multiple partners: No, Dyspareunia: No, Vaginal discharge: no , has been having itching and irritation to vaginal/vulvar region has had this previously and tested for vag dna and tx with diflucan and lidocaine. Uses lidocaine intermittently. She has been sexually active recently without any concerns. UTI symptoms: no, voiding difficulties: no, bowels regular:has irregular bowel habits follows with GI bloating:yes -with GI issues      Menstrual history: Menarche Age- around age 15. She has had one menstrual cycle since IUD placed in 2018 no bleeding since. Birth control: IUD placed 18 by Dr. Amanda Purvis had us to check placement after.     OB History    Para Term  AB Living   0 0 0 0 0 0   SAB TAB Ectopic Molar Multiple Live Births   0 0 0   0         Vitals:    21 1011   BP: 126/70   Site: Left Upper Arm   Position: Sitting   Cuff Size: Medium Adult   Weight: 185 lb (83.9 kg)       Wt Readings from Last 3 Encounters:   21 185 lb (83.9 kg)   21 186 lb (84.4 kg)   21 198 lb (89.8 kg)     Past Medical History:   Diagnosis Date    Acne     Allergic rhinitis     Chickenpox     Depression     Esophageal reflux     Functional dyspepsia     Headache(784.0)     HPV (human papilloma virus) anogenital infection     Hyperlipidemia     Syncope     Unspecified constipation     Unspecified sleep apnea                                                                    Past Surgical History:   Procedure Laterality Date    BREAST SURGERY Left     cyst removed    COLONOSCOPY N/A 05/14/2014    RECTAL BIOPSY, normal mucosa    COLPOSCOPY  2017    INTRAUTERINE DEVICE INSERTION  05/13/2015    Mirena    MD COLPOSCOPY,CERVIX W/ADJ VAG,W/LOOP BX N/A 7/5/2018    LEEP performed by oJny Kumar DO at [facility] 64 ENDOSCOPY  12/10/2014    Small hiatal hernia. esophageal ring     WISDOM TOOTH EXTRACTION       Family History   Problem Relation Age of Onset    Parkinsonism Other     Diabetes Mother     Cancer Mother         Breast    Hypertension Mother     Other Mother         pacemaker    Heart Attack Mother     Arthritis Father     Heart Disease Maternal Grandmother     Diabetes Maternal Grandmother     Cancer Maternal Grandfather     Diabetes Paternal Grandmother     Heart Disease Paternal Grandmother     Diabetes Paternal Grandfather     Heart Disease Paternal Grandfather      Social History     Tobacco Use   Smoking Status Never Smoker   Smokeless Tobacco Never Used     Social History     Substance and Sexual Activity   Alcohol Use Yes    Alcohol/week: 0.0 standard drinks    Comment: occasional        Social History     Tobacco History     Smoking Status  Never Smoker    Smokeless Tobacco Use  Never Used          Alcohol History     Alcohol Use Status  Yes Drinks/Week  0 Standard drinks or equivalent per week Amount  0.0 oz alcohol/wk Comment  occasional          Drug Use     Drug Use Status  No          Sexual Activity     Sexually Active  Yes Partners  Male Birth Control/Protection IUD              Allergies   Allergen Reactions    Other Hives     Advil Sinus Congestion & Pain    Ibuprofen Hives    Pollen Extract      Seasonal allergies     Current Outpatient Medications   Medication Sig Dispense Refill    Lactobacillus (ACIDOPHILUS/L-SPOROGENES) TABS TAKE 1 TABLET BY MOUTH DAILY 30 tablet 5    vitamin D (ERGOCALCIFEROL) 1.25 MG (87691 UT) CAPS capsule Take 1 capsule by mouth once a week 4 capsule 2    naproxen (NAPROSYN) 500 MG tablet TAKE 1 TABLET BY MOUTH 2 TIMES DAILY (WITH MEALS) 60 tablet 5    folic acid (FOLVITE) 1 MG tablet TAKE 1 TABLET BY MOUTH DAILY 90 tablet 1    atorvastatin (LIPITOR) 40 MG tablet TAKE 1 TABLET BY MOUTH DAILY 90 tablet 3    amitriptyline (ELAVIL) 10 MG tablet       amitriptyline (ELAVIL) 25 MG tablet       triamcinolone (KENALOG) 0.1 % ointment       Lactobacillus (PROBIOTIC ACIDOPHILUS PO) Take 1 tablet by mouth daily      clobetasol (TEMOVATE) 0.05 % ointment Apply topically as needed      cyclobenzaprine (FLEXERIL) 10 MG tablet Take 10 mg by mouth as needed      guaiFENesin (MUCINEX) 600 MG extended release tablet Take 600 mg by mouth every 12 hours as needed      levonorgestrel (MIRENA) IUD 52 mg 1 each by Intrauterine route      pantoprazole (PROTONIX) 40 MG tablet Take 40 mg by mouth 2 times daily      clotrimazole-betamethasone (LOTRISONE) 1-0.05 % cream APPLY TOPICALLY TWICE DAILY FOR 3 DAYS THEN DAILY FOR 4 DAYS 45 g 0    fluticasone (FLONASE) 50 MCG/ACT nasal spray 1 spray by Nasal route every morning (before breakfast) 1 Bottle 5    amitriptyline (ELAVIL) 25 MG tablet Take 1 tablet by mouth nightly (Patient taking differently: Take 60 mg by mouth nightly Per [facility] for vaginal itching) 90 tablet 1    topiramate (TOPAMAX) 50 MG tablet   3    ondansetron (ZOFRAN-ODT) 4 MG disintegrating tablet Take 1 tablet by mouth every 8 hours as needed for Nausea or Vomiting 5 tablet 0    spironolactone (ALDACTONE) 50 MG tablet Take 100 mg by equal, round, and reactive to light. Neck:      Thyroid: No thyromegaly. Cardiovascular:      Rate and Rhythm: Normal rate and regular rhythm. Heart sounds: Normal heart sounds. No murmur heard. No friction rub. No gallop. Comments: No bilateral calf tenderness or swelling  Pulmonary:      Effort: Pulmonary effort is normal. No respiratory distress. Breath sounds: Normal breath sounds. No wheezing. Abdominal:      General: Bowel sounds are normal.      Palpations: Abdomen is soft. Tenderness: There is no abdominal tenderness. Genitourinary:     Comments: Breasts nipples everted, no masses or tenderness, does BSE  Vulva-no lesions, errythema  Vagina-pink rugated  Cervix-firm, 2 cm. Nontender, freely movable, no lesions,  IUD Strings noted  Uterus-ant. Smooth, firm, nontender, freely movable  Adnexa-no masses or tenderness   Musculoskeletal:         General: Normal range of motion. Cervical back: Normal range of motion and neck supple. Lymphadenopathy:      Cervical: No cervical adenopathy. Skin:     General: Skin is warm and dry. Findings: No rash. Neurological:      Mental Status: She is alert and oriented to person, place, and time. Cranial Nerves: No cranial nerve deficit. Deep Tendon Reflexes: Reflexes are normal and symmetric. Psychiatric:         Behavior: Behavior normal.         Thought Content: Thought content normal.         Judgment: Judgment normal.       /70 (Site: Left Upper Arm, Position: Sitting, Cuff Size: Medium Adult)   Wt 185 lb (83.9 kg)   Breastfeeding No   BMI 28.13 kg/m²     Assessment:       Diagnosis Orders   1. Visit for gynecologic examination  PAP SMEAR   2. Vaginal itching  VAGINITIS DNA PROBE    Chlamydia/GC DNA, Thin Prep   3. Vaginal discharge  Chlamydia/GC DNA, Thin Prep       Breast exam completed. Pelvic exam pap smear collected and sent.  Cultures sent Yes    Plan:     BSE reviewed  STD prevention reviewed  Vag irritation- check vag dna gbs/GC. Continue with [facility] for lichen sclerosus  BC  Yes-iud (strings visualized)  DVT signs reviewed with patient. Refill medication if appropriate  Diet & Exercise reviewed with pt. Preventive  Health through PCP   RV prn/annual   TVUS ordered for history of ovarian cysts and current right pelvic pain. Orders Placed This Encounter   Procedures    VAGINITIS DNA PROBE    Chlamydia/GC DNA, Thin Prep     Standing Status:   Future     Number of Occurrences:   1     Standing Expiration Date:   6/24/2022    PAP SMEAR     Standing Status:   Future     Standing Expiration Date:   6/24/2022     Order Specific Question:   Collection Type     Answer: Thin Prep     Order Specific Question:   Prior Abnormal Pap Test     Answer:   No     Order Specific Question:   Screening or Diagnostic     Answer:   Screening     Order Specific Question:   HPV Requested? Answer:   Yes     Order Specific Question:   High Risk Patient     Answer:   N/A         Patient given educational materials - seepatient instructions. Discussed use, benefit, and side effects of prescribed medications. All patient questions answered. Pt voiced understanding. Reviewed health maintenance. Instructed to continue current medications, diet and exercise. Patient agreedwith treatment plan. Follow up as directed.       Electronically signed by Lisset Moseley DO on 6/25/2021at 11:18 AM

## 2021-06-27 LAB
CHLAMYDIA BY THIN PREP: NEGATIVE
HPV SAMPLE: NORMAL
HPV, GENOTYPE 16: NOT DETECTED
HPV, GENOTYPE 18: NOT DETECTED
HPV, HIGH RISK OTHER: NOT DETECTED
HPV, INTERPRETATION: NORMAL
N. GONORRHOEAE DNA, THIN PREP: NEGATIVE
SPECIMEN DESCRIPTION: NORMAL
SPECIMEN DESCRIPTION: NORMAL

## 2021-06-29 ENCOUNTER — TELEPHONE (OUTPATIENT)
Dept: ORTHOPEDIC SURGERY | Age: 41
End: 2021-06-29

## 2021-06-29 NOTE — TELEPHONE ENCOUNTER
Pt has 3 mo f/u 7/8 @810 w/ dr Yash Disla - due to schd conflict - if pts STILL  Needs to f/u - the appt needs reschd after wk of 7/12 - msg left for pt to call back to either canc or reschd/bb

## 2021-06-30 DIAGNOSIS — M70.60 GREATER TROCHANTERIC BURSITIS, UNSPECIFIED LATERALITY: Primary | ICD-10-CM

## 2021-06-30 LAB — CYTOLOGY REPORT: NORMAL

## 2021-08-18 ENCOUNTER — OFFICE VISIT (OUTPATIENT)
Dept: ORTHOPEDIC SURGERY | Age: 41
End: 2021-08-18
Payer: COMMERCIAL

## 2021-08-18 VITALS
BODY MASS INDEX: 28.95 KG/M2 | WEIGHT: 191 LBS | DIASTOLIC BLOOD PRESSURE: 75 MMHG | RESPIRATION RATE: 13 BRPM | HEIGHT: 68 IN | HEART RATE: 91 BPM | SYSTOLIC BLOOD PRESSURE: 109 MMHG

## 2021-08-18 DIAGNOSIS — M70.62 GREATER TROCHANTERIC BURSITIS OF BOTH HIPS: Primary | ICD-10-CM

## 2021-08-18 DIAGNOSIS — M70.61 GREATER TROCHANTERIC BURSITIS OF BOTH HIPS: Primary | ICD-10-CM

## 2021-08-18 PROCEDURE — 20611 DRAIN/INJ JOINT/BURSA W/US: CPT | Performed by: PHYSICIAN ASSISTANT

## 2021-08-18 RX ORDER — METHYLPREDNISOLONE ACETATE 40 MG/ML
40 INJECTION, SUSPENSION INTRA-ARTICULAR; INTRALESIONAL; INTRAMUSCULAR; SOFT TISSUE ONCE
Status: COMPLETED | OUTPATIENT
Start: 2021-08-18 | End: 2021-08-18

## 2021-08-18 RX ORDER — LIDOCAINE HYDROCHLORIDE 10 MG/ML
8 INJECTION, SOLUTION INFILTRATION; PERINEURAL ONCE
Status: COMPLETED | OUTPATIENT
Start: 2021-08-18 | End: 2021-08-18

## 2021-08-18 RX ADMIN — METHYLPREDNISOLONE ACETATE 40 MG: 40 INJECTION, SUSPENSION INTRA-ARTICULAR; INTRALESIONAL; INTRAMUSCULAR; SOFT TISSUE at 10:10

## 2021-08-18 RX ADMIN — LIDOCAINE HYDROCHLORIDE 8 ML: 10 INJECTION, SOLUTION INFILTRATION; PERINEURAL at 10:09

## 2021-08-18 NOTE — PROGRESS NOTES
[address] [facility]  [address] [place] [id number]  Dept: [phone]  Dept Fax: [phone]          Right Hip Visit - Follow up    Subjective:     Chief Complaint   Patient presents with    Follow-up     R hip pain (LI 4/8/21)     HPI:     Heather Amezcua presents today for Bilateral hip pain. Patient is here today for cortisone injections into bilateral hip. She had her last cortisone injection was on 4/8/2021. Last time her left hip was injected was 1/7/2021. I have reviewed the CC, and if not present in this note, I have reviewed in the patient's chart. I agree with the documentation provided by other staff and have reviewed their documentation prior to providing my signature indicating agreement. Vitals:   /75   Pulse 91   Resp 13   Ht 5' 8\" (1.727 m)   Wt 191 lb (86.6 kg)   BMI 29.04 kg/m²  Body mass index is 29.04 kg/m². Assessment:     1. Greater trochanteric bursitis of both hips          Procedure:   Procedure: Yes    Greater Trochanteric Bursa Injection     Procedure: Heather Amezcua agreed today for a Corticosteroid injection into the bilateral greater trochanteric bursa. The patient was placed in the lateral position with the affected side up. The point of the maximum tenderness was marked with the closed end of a click type pen. The skin was prepped with betadine in a sterile fashion. Utilizing a "[employer] ultrasound unit with a variable frequency linear transducer and sterile technique a 5 cc solution containing 4cc of 1% Lidocaine and 1 cc containing 40mg of depomedrol was injected into the greater trochanteric bursa with a 22 gauge spinal needle. The wound was cleansed and a Band-Aid was placed. The patient tolerated the procedure without difficulty.  Adverse reactions of the injection was discussed with the patient including signs of infection (increasing pain, redness, swelling) and the patient was instructed to call immediately with any of these symptoms. The images are stored on SD card in the machine until downloaded to the patient's chart. Plan:   Patient should return to the clinic in 3 months to follow up with Dianelys Chapin PA-C. The patient will call the office immediately with any problems. Orders Placed This Encounter   Medications    lidocaine 1 % injection 8 mL    methylPREDNISolone acetate (DEPO-MEDROL) injection 40 mg    methylPREDNISolone acetate (DEPO-MEDROL) injection 40 mg       No orders of the defined types were placed in this encounter.           Electronically signed by Anthony Posey PA-C, on 8/18/2021 at 9:48 AM

## 2021-09-02 ENCOUNTER — PATIENT MESSAGE (OUTPATIENT)
Dept: OBGYN CLINIC | Age: 41
End: 2021-09-02

## 2021-09-02 RX ORDER — FLUCONAZOLE 150 MG/1
150 TABLET ORAL ONCE
Qty: 1 TABLET | Refills: 0 | Status: SHIPPED | OUTPATIENT
Start: 2021-09-02 | End: 2021-09-02

## 2021-09-02 NOTE — TELEPHONE ENCOUNTER
From: Fei Garcia  To: Jadiel Shaw DO  Sent: 9/2/2021 7:51 AM EDT  Subject: Prescription Question    Dr. Regla Mas,    I can coming down with yeast infection symptoms and I was hoping that I could get a prescription dispensed for this. The symptoms have going on the past few days and have not gone away but seem increase. Im willing to set up an appointment if need be but with holiday weekend coming I would appreciate starting medication if I can. Thank you.     Fei Garcia

## 2021-09-15 ENCOUNTER — OFFICE VISIT (OUTPATIENT)
Dept: OBGYN CLINIC | Age: 41
End: 2021-09-15
Payer: COMMERCIAL

## 2021-09-15 ENCOUNTER — HOSPITAL ENCOUNTER (OUTPATIENT)
Age: 41
Setting detail: SPECIMEN
Discharge: HOME | End: 2021-09-15
Payer: COMMERCIAL

## 2021-09-15 VITALS
WEIGHT: 188 LBS | DIASTOLIC BLOOD PRESSURE: 75 MMHG | BODY MASS INDEX: 27.85 KG/M2 | SYSTOLIC BLOOD PRESSURE: 115 MMHG | HEIGHT: 69 IN

## 2021-09-15 DIAGNOSIS — N89.8 VAGINAL ITCHING: ICD-10-CM

## 2021-09-15 DIAGNOSIS — N89.8 VAGINAL ITCHING: Primary | ICD-10-CM

## 2021-09-15 PROCEDURE — 99213 OFFICE O/P EST LOW 20 MIN: CPT | Performed by: NURSE PRACTITIONER

## 2021-09-15 PROCEDURE — G8417 CALC BMI ABV UP PARAM F/U: HCPCS | Performed by: NURSE PRACTITIONER

## 2021-09-15 PROCEDURE — 1036F TOBACCO NON-USER: CPT | Performed by: NURSE PRACTITIONER

## 2021-09-15 PROCEDURE — G8427 DOCREV CUR MEDS BY ELIG CLIN: HCPCS | Performed by: NURSE PRACTITIONER

## 2021-09-15 ASSESSMENT — ENCOUNTER SYMPTOMS
NAUSEA: 0
VOMITING: 0

## 2021-09-15 NOTE — PROGRESS NOTES
[facility] PHYSICIANS  [facility] [address]  [address] [address]  Dept: [phone]  Dept Fax: [phone]    Anna Franco is a 36 y.o. female who presents today for her medical conditions/complaintsas noted below. Anna Franco is c/o of Vaginal Itching        HPI:     HPI  Pt is here with complaints of a vaginal itching for 2 weeks. Denies noting  Discharge or Odor. She has hx lichens sclerosus and follow with [facility] vulvar clinic. No UTI sx, no pelvic pain, fevers, chills, nausea/vomiting. No recent antibiotics, changes in soaps.    No change in partners- has had increase in sexual activity though  LMP- Mirena IUDdeos not have bleeding with it      Past Medical History:   Diagnosis Date    Acne     Allergic rhinitis     Chickenpox     Depression     Esophageal reflux     Functional dyspepsia     Headache(784.0)     HPV (human papilloma virus) anogenital infection     Hyperlipidemia     Syncope     Unspecified constipation     Unspecified sleep apnea       Past Surgical History:   Procedure Laterality Date    BREAST SURGERY Left     cyst removed    COLONOSCOPY N/A 05/14/2014    RECTAL BIOPSY, normal mucosa    COLPOSCOPY  2017    INTRAUTERINE DEVICE INSERTION  05/13/2015    Mirena    OR COLPOSCOPY,CERVIX W/ADJ VAG,W/LOOP BX N/A 7/5/2018    LEEP performed by Brie Chatterjee DO at [facility] ENDOSCOPY  12/10/2014    Small hiatal hernia. esophageal ring     WISDOM TOOTH EXTRACTION         Family History   Problem Relation Age of Onset    Parkinsonism Other     Diabetes Mother     Cancer Mother         Breast    Hypertension Mother     Other Mother         pacemaker    Heart Attack Mother     Arthritis Father     Heart Disease Maternal Grandmother     Diabetes Maternal Grandmother     Cancer Maternal Grandfather     Diabetes Paternal Grandmother     Heart Disease Paternal Grandmother    [facility] Diabetes Paternal Grandfather     Heart Disease Paternal Grandfather        Social History     Tobacco Use    Smoking status: Never Smoker    Smokeless tobacco: Never Used   Substance Use Topics    Alcohol use:  Yes     Alcohol/week: 0.0 standard drinks     Comment: occasional      Current Outpatient Medications   Medication Sig Dispense Refill    vitamin D (ERGOCALCIFEROL) 1.25 MG (82135 UT) CAPS capsule Take 1 capsule by mouth once a week 4 capsule 2    naproxen (NAPROSYN) 500 MG tablet TAKE 1 TABLET BY MOUTH 2 TIMES DAILY (WITH MEALS) 60 tablet 5    folic acid (FOLVITE) 1 MG tablet TAKE 1 TABLET BY MOUTH DAILY 90 tablet 1    atorvastatin (LIPITOR) 40 MG tablet TAKE 1 TABLET BY MOUTH DAILY 90 tablet 3    amitriptyline (ELAVIL) 10 MG tablet       amitriptyline (ELAVIL) 25 MG tablet       triamcinolone (KENALOG) 0.1 % ointment       Lactobacillus (PROBIOTIC ACIDOPHILUS PO) Take 1 tablet by mouth daily      clobetasol (TEMOVATE) 0.05 % ointment Apply topically as needed      cyclobenzaprine (FLEXERIL) 10 MG tablet Take 10 mg by mouth as needed      guaiFENesin (MUCINEX) 600 MG extended release tablet Take 600 mg by mouth every 12 hours as needed      levonorgestrel (MIRENA) IUD 52 mg 1 each by Intrauterine route      pantoprazole (PROTONIX) 40 MG tablet Take 40 mg by mouth 2 times daily      clotrimazole-betamethasone (LOTRISONE) 1-0.05 % cream APPLY TOPICALLY TWICE DAILY FOR 3 DAYS THEN DAILY FOR 4 DAYS 45 g 0    fluticasone (FLONASE) 50 MCG/ACT nasal spray 1 spray by Nasal route every morning (before breakfast) 1 Bottle 5    topiramate (TOPAMAX) 50 MG tablet   3    ondansetron (ZOFRAN-ODT) 4 MG disintegrating tablet Take 1 tablet by mouth every 8 hours as needed for Nausea or Vomiting 5 tablet 0    SUMAtriptan (IMITREX) 50 MG tablet Take 50 mg by mouth once as needed for Migraine      sucralfate (CARAFATE) 1 GM tablet TAKE 1 TABLET 4 TIMES DAILY (Patient taking differently: 2 times daily ) 120 Heart sounds: Normal heart sounds. No murmur heard. No friction rub. No gallop. Pulmonary:      Effort: Pulmonary effort is normal.      Breath sounds: Normal breath sounds. No wheezing. Abdominal:      General: Bowel sounds are normal.      Palpations: Abdomen is soft. Abdomen is not rigid. Tenderness: There is no abdominal tenderness. There is no guarding or rebound. Genitourinary:     Labia:         Right: No rash, tenderness, lesion or injury. Left: No rash, tenderness, lesion or injury. Vagina: No signs of injury and foreign body. Vaginal discharge (minima thin white) present. No erythema, tenderness or bleeding. Cervix: No cervical motion tenderness, discharge or friability. Uterus: Not enlarged and not tender. Adnexa:         Right: No mass, tenderness or fullness. Left: No mass, tenderness or fullness. Comments: cervix- IUD strings visible  Musculoskeletal:         General: Normal range of motion. Cervical back: Normal range of motion and neck supple. Skin:     General: Skin is warm and dry. Findings: No rash. Neurological:      Mental Status: She is alert and oriented to person, place, and time. Cranial Nerves: No cranial nerve deficit. Psychiatric:         Behavior: Behavior normal.         Thought Content: Thought content normal.         Judgment: Judgment normal.       /75 (Site: Left Upper Arm, Position: Sitting, Cuff Size: Medium Adult)   Ht 5' 9\" (1.753 m)   Wt 188 lb (85.3 kg)   BMI 27.76 kg/m²     Assessment:       Diagnosis Orders   1. Vaginal itching  VAGINITIS DNA PROBE    Culture, Strep B Screen, Vaginal/Rectal       Pelvic exam completed cultures obtained and sent    Plan:      Vaginal discharge- check cultures tx if positive- vag dna and GBS, declined GC. No CMT tenderness or pelvic tenderness with exam, denies consitutional symptoms.   Monitor for fevers, chills, n/v, abdominal/pelvic let us know if

## 2021-09-16 LAB
DIRECT EXAM: NORMAL
Lab: NORMAL
SPECIMEN DESCRIPTION: NORMAL

## 2021-09-18 LAB
CULTURE: NORMAL
Lab: NORMAL
SPECIMEN DESCRIPTION: NORMAL

## 2021-11-16 NOTE — PROGRESS NOTES
[facility] [address] AND [facility]ICINE  [address] [place] [id number]  Dept: [phone]  Dept Fax: [phone]        Bilateral Hip Office Visit    Subjective:     Chief Complaint   Patient presents with    Follow-up     B hip pain (LI 8/18/21)     HPI:     Jonnathan Jj is a , presents with a 3 year history of pain in the right hip. The pain does radiate into the thigh and into the groin but it is minimal. The pain is present over the lateral aspect of the hip. The pain is most troublesome during ambulatory activity and now limits the patient`s walking distance to shorter distances. Night pain, which disturbs the patient`s sleep is a problem. The patient has had to give up some activities such as exercising, lying down on her side at night and standing for long periods of time, which has produced a consequent deterioration in quality of life. She has tried heating pad, home exercises, naproxen and reduction of activity and reports little improvement. Back pain is not present and does not interfere with the patient`s life as does the hip. The patient has had a cortisone injection into bilateral greater trochanteric bursa on 8/18/2021 with good relief until about 2 weeks ago. She then returned and had an intra-articular injection on 12/11/2020 with excellent relief.  The patient has tried physical therapy with no improvement.  She also states she is not doing exercises she was given a therapy. She also sees a chiropractor regularly. The patient has not had surgery. The opposite hip is okay. Knee pain is not noted.   Patient states she has been increasing her exercise lately and has been having more pain on the lateral hip and some groin pain on the right hip. She knows she has a torn labrum in the right hip but is not ready to do surgery at this time, but she is starting to consider it.   She states she also has some low back tightness today. ROS:     Review of Systems   Constitutional: Positive for activity change. Negative for appetite change, fatigue and fever. Respiratory: Negative. Negative for apnea, cough, chest tightness and shortness of breath. Cardiovascular: Negative. Negative for chest pain, palpitations and leg swelling. Gastrointestinal: Negative for abdominal distention, abdominal pain, constipation, diarrhea, nausea and vomiting. Genitourinary: Negative for difficulty urinating, dysuria and hematuria. Musculoskeletal: Positive for arthralgias and gait problem. Negative for joint swelling and myalgias. Skin: Negative for color change and rash. Neurological: Negative for dizziness, weakness, numbness and headaches. Psychiatric/Behavioral: Positive for sleep disturbance.        Past Medical History:    Past Medical History:   Diagnosis Date    Acne     Allergic rhinitis     Chickenpox     Depression     Esophageal reflux     Functional dyspepsia     Headache(784.0)     HPV (human papilloma virus) anogenital infection     Hyperlipidemia     Syncope     Unspecified constipation     Unspecified sleep apnea        Past Surgical History:    Past Surgical History:   Procedure Laterality Date    BREAST SURGERY Left     cyst removed    COLONOSCOPY N/A 05/14/2014    RECTAL BIOPSY, normal mucosa    COLPOSCOPY  2017    INTRAUTERINE DEVICE INSERTION  05/13/2015    Mirena    [place] COLPOSCOPY,CERVIX W/ADJ VAG,W/LOOP BX N/A 7/5/2018    LEEP performed by Wade Lorenzo DO at [facility] ENDOSCOPY  12/10/2014    Small hiatal hernia. esophageal ring     WISDOM TOOTH EXTRACTION         CurrentMedications:   Current Outpatient Medications   Medication Sig Dispense Refill    Lactobacillus (ACIDOPHILUS/L-SPOROGENES) TABS TAKE 1 TABLET BY MOUTH DAILY 30 tablet 5    vitamin D (ERGOCALCIFEROL) 1.25 MG (73475 UT) CAPS capsule Take 1 capsule by mouth once a week 4 capsule 2    naproxen (NAPROSYN) 500 MG tablet TAKE 1 TABLET BY MOUTH 2 TIMES DAILY (WITH MEALS) 60 tablet 5    folic acid (FOLVITE) 1 MG tablet TAKE 1 TABLET BY MOUTH DAILY 90 tablet 1    atorvastatin (LIPITOR) 40 MG tablet TAKE 1 TABLET BY MOUTH DAILY 90 tablet 3    amitriptyline (ELAVIL) 10 MG tablet       amitriptyline (ELAVIL) 25 MG tablet       triamcinolone (KENALOG) 0.1 % ointment       Lactobacillus (PROBIOTIC ACIDOPHILUS PO) Take 1 tablet by mouth daily      clobetasol (TEMOVATE) 0.05 % ointment Apply topically as needed      cyclobenzaprine (FLEXERIL) 10 MG tablet Take 10 mg by mouth as needed      guaiFENesin (MUCINEX) 600 MG extended release tablet Take 600 mg by mouth every 12 hours as needed      levonorgestrel (MIRENA) IUD 52 mg 1 each by Intrauterine route      pantoprazole (PROTONIX) 40 MG tablet Take 40 mg by mouth 2 times daily      clotrimazole-betamethasone (LOTRISONE) 1-0.05 % cream APPLY TOPICALLY TWICE DAILY FOR 3 DAYS THEN DAILY FOR 4 DAYS 45 g 0    fluticasone (FLONASE) 50 MCG/ACT nasal spray 1 spray by Nasal route every morning (before breakfast) 1 Bottle 5    amitriptyline (ELAVIL) 25 MG tablet Take 1 tablet by mouth nightly (Patient taking differently: Take 60 mg by mouth nightly Per [facility] for vaginal itching) 90 tablet 1    topiramate (TOPAMAX) 50 MG tablet   3    ondansetron (ZOFRAN-ODT) 4 MG disintegrating tablet Take 1 tablet by mouth every 8 hours as needed for Nausea or Vomiting 5 tablet 0    SUMAtriptan (IMITREX) 50 MG tablet Take 50 mg by mouth once as needed for Migraine      sucralfate (CARAFATE) 1 GM tablet TAKE 1 TABLET 4 TIMES DAILY (Patient taking differently: 2 times daily ) 120 tablet 1    EPINEPHrine (EPIPEN) 0.3 MG/0.3ML SOAJ injection Inject 0.3 mg into the muscle as needed      fexofenadine (ALLEGRA) 180 MG tablet Take 180 mg by mouth daily.        Current Facility-Administered Medications   Medication Dose Route Frequency Provider Last Rate Last Admin  methylPREDNISolone acetate (DEPO-MEDROL) injection 80 mg  80 mg Intra-artICUlar Once Shirin Baltazar PA-C        lidocaine 1 % injection 4 mL  4 mL Intra-artICUlar Once Shirin Baltazar PA-C        methylPREDNISolone acetate (DEPO-MEDROL) injection 80 mg  80 mg Intra-artICUlar Once Shirin Baltazar PA-C           Allergies: Other, Ibuprofen, and Pollen extract    Social History:   Social History     Socioeconomic History    Marital status: Single     Spouse name: None    Number of children: None    Years of education: None    Highest education level: None   Occupational History    None   Tobacco Use    Smoking status: Never Smoker    Smokeless tobacco: Never Used   Vaping Use    Vaping Use: Never used   Substance and Sexual Activity    Alcohol use: Yes     Alcohol/week: 0.0 standard drinks     Comment: occasional    Drug use: No    Sexual activity: Yes     Partners: Male     Birth control/protection: I.U.D. Other Topics Concern    None   Social History Narrative    None     Social Determinants of Health     Financial Resource Strain:     Difficulty of Paying Living Expenses: Not on file   Food Insecurity:     Worried About Running Out of Food in the Last Year: Not on file    Mike of Food in the Last Year: Not on file   Transportation Needs:     Lack of Transportation (Medical): Not on file    Lack of Transportation (Non-Medical):  Not on file   Physical Activity:     Days of Exercise per Week: Not on file    Minutes of Exercise per Session: Not on file   Stress:     Feeling of Stress : Not on file   Social Connections:     Frequency of Communication with Friends and Family: Not on file    Frequency of Social Gatherings with Friends and Family: Not on file    Attends Denominational Services: Not on file    Active Member of Clubs or Organizations: Not on file    Attends Club or Organization Meetings: Not on file    Marital Status: Not on file   Intimate Partner Violence: abduction, internal rotation, external rotation. PALP: The patient is  tender to palpation over the greater trochanter. TEST: Log roll is positive with lateral pain. No apparent leg length discrepancy. mild Stenchfield test. negative Impingement test.Negative Trendelenburg test. Negative Jadiel's test. +C sign. +labral clunks with FADIR on the right. Lateral pain with FADIR bilaterally and right groin pain with FADIR. Assessment:     1. Greater trochanteric bursitis of both hips      Procedures:    Procedure: yes    Greater Trochanteric Bursa Injection     Procedure: Jewel Davidson agreed today for a Corticosteroid injection into the bilateral greater trochanteric bursa. The patient was placed in the lateral position with the affected side up. The point of the maximum tenderness was marked with the closed end of a click type pen. The skin was prepped with betadine in a sterile fashion. Utilizing a Kovio ultrasound unit with a variable frequency linear transducer and sterile technique a 3 cc solution containing 2cc of 1% lidocaine  and 1 cc containing 80 mg of depomedrol was injected into the greater trochanteric bursa with a 22 gauge spinal needle. The wound was cleansed and a Band-Aid was placed. The patient tolerated the procedure without difficulty. Adverse reactions of the injection was discussed with the patient including signs of infection (increasing pain, redness, swelling) and the patient was instructed to call immediately with any of these symptoms. The images are stored on SD card in the machine until downloaded to the patient's chart. Radiology:   No results found. Plan:   Treatment : We discussed the etiologies and natural histories of trochanteric bursitis of the bilateral hips. We discussed the various treatment alternatives including anti-inflammatory medications, physical therapy, injections, further imaging studies and as a last result surgery.  During today's visit, discussed that the last injections worked really well and lasted quite a while. They really only started hurting her a couple of weeks ago. The patient has opted for a cortisone injection into the bilateral greater trochanteric bursa to help reduce inflammation and pain. The injection site should never get red, hot, or swollen and if it does the patient will contact our office right away. The patient may experience a increase in soreness the first 24-48 hours due to a cortisone flair and can take anti-inflammatories for a short period of time to reduce that soreness. The patient should not submerge the injection site in water for a minimum of 24 hours to avoid infection. This means no lakes, pools, ponds, or hot tubs for 24 hours. If the patient is diabetic the injection may increase their blood sugar for up to one week. The patient can do this cortisone injection once every 3 months as needed. If the injections stop working and do not give the patient relief the patient should consider surgical interventions to produce long term relief. We discussed doing some stretches and I showed her some months that should help loosen up her back. We can do injections every 3 months or as needed. She will make an appointment with me when needed. The patient will call the office immediately with any problems. Orders Placed This Encounter   Medications    methylPREDNISolone acetate (DEPO-MEDROL) injection 80 mg    lidocaine 1 % injection 4 mL    methylPREDNISolone acetate (DEPO-MEDROL) injection 80 mg       No orders of the defined types were placed in this encounter. This note is created with the assistance of a speech recognition program.  While intending to generate a document that actually reflects the content of the visit, the document can still have some errors including those of syntax and sound a like substitutions which may escape proof reading.   In such instances, actual meaning can be extrapolated by contextual diversion      Electronically signed by Amaya Puga PA-C, on 11/17/2021 at 9:17 AM

## 2021-11-17 ENCOUNTER — OFFICE VISIT (OUTPATIENT)
Dept: ORTHOPEDIC SURGERY | Age: 41
End: 2021-11-17
Payer: COMMERCIAL

## 2021-11-17 VITALS
HEIGHT: 69 IN | SYSTOLIC BLOOD PRESSURE: 102 MMHG | HEART RATE: 89 BPM | DIASTOLIC BLOOD PRESSURE: 66 MMHG | BODY MASS INDEX: 27.85 KG/M2 | WEIGHT: 188 LBS | RESPIRATION RATE: 14 BRPM

## 2021-11-17 DIAGNOSIS — M70.61 GREATER TROCHANTERIC BURSITIS OF BOTH HIPS: Primary | ICD-10-CM

## 2021-11-17 DIAGNOSIS — M70.62 GREATER TROCHANTERIC BURSITIS OF BOTH HIPS: Primary | ICD-10-CM

## 2021-11-17 PROCEDURE — 20610 DRAIN/INJ JOINT/BURSA W/O US: CPT | Performed by: PHYSICIAN ASSISTANT

## 2021-11-17 PROCEDURE — 1036F TOBACCO NON-USER: CPT | Performed by: PHYSICIAN ASSISTANT

## 2021-11-17 PROCEDURE — G8417 CALC BMI ABV UP PARAM F/U: HCPCS | Performed by: PHYSICIAN ASSISTANT

## 2021-11-17 PROCEDURE — G8484 FLU IMMUNIZE NO ADMIN: HCPCS | Performed by: PHYSICIAN ASSISTANT

## 2021-11-17 PROCEDURE — G8427 DOCREV CUR MEDS BY ELIG CLIN: HCPCS | Performed by: PHYSICIAN ASSISTANT

## 2021-11-17 PROCEDURE — 99213 OFFICE O/P EST LOW 20 MIN: CPT | Performed by: PHYSICIAN ASSISTANT

## 2021-11-17 RX ORDER — LIDOCAINE HYDROCHLORIDE 10 MG/ML
4 INJECTION, SOLUTION INFILTRATION; PERINEURAL ONCE
Status: COMPLETED | OUTPATIENT
Start: 2021-11-17 | End: 2021-11-17

## 2021-11-17 RX ORDER — METHYLPREDNISOLONE ACETATE 80 MG/ML
80 INJECTION, SUSPENSION INTRA-ARTICULAR; INTRALESIONAL; INTRAMUSCULAR; SOFT TISSUE ONCE
Status: COMPLETED | OUTPATIENT
Start: 2021-11-17 | End: 2021-11-17

## 2021-11-17 RX ADMIN — METHYLPREDNISOLONE ACETATE 80 MG: 80 INJECTION, SUSPENSION INTRA-ARTICULAR; INTRALESIONAL; INTRAMUSCULAR; SOFT TISSUE at 11:57

## 2021-11-17 RX ADMIN — LIDOCAINE HYDROCHLORIDE 4 ML: 10 INJECTION, SOLUTION INFILTRATION; PERINEURAL at 11:56

## 2021-11-17 ASSESSMENT — ENCOUNTER SYMPTOMS
DIARRHEA: 0
ABDOMINAL DISTENTION: 0
CHEST TIGHTNESS: 0
SHORTNESS OF BREATH: 0
VOMITING: 0
ABDOMINAL PAIN: 0
RESPIRATORY NEGATIVE: 1
NAUSEA: 0
COLOR CHANGE: 0
APNEA: 0
COUGH: 0
CONSTIPATION: 0

## 2021-12-21 ENCOUNTER — PATIENT MESSAGE (OUTPATIENT)
Dept: OBGYN CLINIC | Age: 41
End: 2021-12-21

## 2021-12-21 NOTE — TELEPHONE ENCOUNTER
From: Myles Washington  To: Dr. Daniel Golden: 12/21/2021 12:15 AM EST  Subject: Yeast infection issue     Dr. Leo Anthony:   I have been having yeast infection issues for about week and half. And I was hoping I can get medication dispensed before the holiday weekend. If you do need to see me for an appointment I am off work on Wednesday. Either the medication or appointment Id appreciate.     Thank you,    Myles Washington

## 2021-12-22 RX ORDER — FLUCONAZOLE 150 MG/1
150 TABLET ORAL ONCE
Qty: 1 TABLET | Refills: 1 | Status: SHIPPED | OUTPATIENT
Start: 2021-12-22 | End: 2021-12-22

## 2022-01-31 ENCOUNTER — TELEPHONE (OUTPATIENT)
Dept: ORTHOPEDIC SURGERY | Age: 42
End: 2022-01-31

## 2022-01-31 NOTE — TELEPHONE ENCOUNTER
Left Voicemail for patient to [facility] & Reschedule appt.  with Dr Lisa Ramey or Keiry Granger, as Pierre Brunson will not be in the office until the first week of March

## 2022-02-14 ENCOUNTER — OFFICE VISIT (OUTPATIENT)
Dept: PRIMARY CARE CLINIC | Age: 42
End: 2022-02-14
Payer: COMMERCIAL

## 2022-02-14 ENCOUNTER — NURSE TRIAGE (OUTPATIENT)
Dept: OTHER | Facility: CLINIC | Age: 42
End: 2022-02-14

## 2022-02-14 VITALS
DIASTOLIC BLOOD PRESSURE: 64 MMHG | OXYGEN SATURATION: 99 % | TEMPERATURE: 98.6 F | SYSTOLIC BLOOD PRESSURE: 110 MMHG | HEART RATE: 80 BPM | WEIGHT: 194.4 LBS | BODY MASS INDEX: 28.71 KG/M2

## 2022-02-14 DIAGNOSIS — H10.30 ACUTE CONJUNCTIVITIS, UNSPECIFIED ACUTE CONJUNCTIVITIS TYPE, UNSPECIFIED LATERALITY: ICD-10-CM

## 2022-02-14 DIAGNOSIS — L30.9 DERMATITIS: Primary | ICD-10-CM

## 2022-02-14 PROCEDURE — 99213 OFFICE O/P EST LOW 20 MIN: CPT | Performed by: PHYSICIAN ASSISTANT

## 2022-02-14 RX ORDER — POLYMYXIN B SULFATE AND TRIMETHOPRIM 1; 10000 MG/ML; [USP'U]/ML
1 SOLUTION OPHTHALMIC EVERY 4 HOURS
Qty: 1 EACH | Refills: 0 | Status: SHIPPED | OUTPATIENT
Start: 2022-02-14 | End: 2022-02-21

## 2022-02-14 RX ORDER — PREDNISONE 20 MG/1
20 TABLET ORAL 2 TIMES DAILY
Qty: 10 TABLET | Refills: 0 | Status: SHIPPED | OUTPATIENT
Start: 2022-02-14 | End: 2022-02-19

## 2022-02-14 NOTE — PATIENT INSTRUCTIONS
Patient Education        Dermatitis: Care Instructions  Your Care Instructions  Dermatitis is the general name used for any rash or inflammation of the skin. Different kinds of dermatitis cause different kinds of rashes. Common causes of a rash include new medicines, plants (such as poison oak or poison ivy), heat, and stress. Certain illnesses can also cause a rash. An allergic reaction to something that touches your skin, such as latex, nickel, or poison ivy, is called contact dermatitis. Contact dermatitis may also be caused by something that irritates the skin, such as bleach, a chemical, or soap. These types of rashes cannot be spread from person to person. How long your rash will last depends on what caused it. Rashes may last a few days or months. Follow-up care is a key part of your treatment and safety. Be sure to make and go to all appointments, and call your doctor if you are having problems. It's also a good idea to know your test results and keep a list of the medicines you take. How can you care for yourself at home? · Do not scratch the rash. Cut your nails short, and file them smooth. Or wear gloves if this helps keep you from scratching. · Wash the area with water only. Pat dry. · Put cold, wet cloths on the rash to reduce itching. · Keep cool, and stay out of the sun. · Leave the rash open to the air as much as possible. · If the rash itches, use hydrocortisone cream. Follow the directions on the label. Calamine lotion may help for plant rashes. · If itching affects your sleep, ask your doctor if you can take an antihistamine that might reduce itching and make you sleepy, such as diphenhydramine (Benadryl). Be safe with medicines. Read and follow all instructions on the label. · If your doctor prescribed a cream, use it as directed. If your doctor prescribed medicine, take it exactly as directed. When should you call for help?    Call your doctor now or seek immediate medical care if:    · You have symptoms of infection, such as:  ? Increased pain, swelling, warmth, or redness. ? Red streaks leading from the area. ? Pus draining from the area. ? A fever.     · You have joint pain along with the rash. Watch closely for changes in your health, and be sure to contact your doctor if:    · Your rash is changing or getting worse.     · You are not getting better as expected. Where can you learn more? Go to https://[employer]. org and sign in to your meebee account. Enter [phone] in the [facility] box to learn more about \"Dermatitis: Care Instructions. \"     If you do not have an account, please click on the \"Sign Up Now\" link. Current as of: March 3, 2021               Content Version: 13.1  © 5481-5589 [employer]. Care instructions adapted under license by [facility] ([facility]). If you have questions about a medical condition or this instruction, always ask your healthcare professional. [place] [address] any warranty or liability for your use of this information. Patient Education        Atopic Dermatitis: Care Instructions  Overview  Atopic dermatitis (also called eczema) is a skin problem that causes intense itching and a red, raised rash. In severe cases, the rash develops clear fluid-filled blisters. The rash is not contagious. You can't catch it from others. People with this condition seem to have very sensitive immune systems that are likely to react to things that cause allergies. The immune system is the body's way of fighting infection. There is no cure for atopic dermatitis. But you may be able to control it with care at home. Follow-up care is a key part of your treatment and safety. Be sure to make and go to all appointments, and call your doctor if you are having problems. It's also a good idea to know your test results and keep a list of the medicines you take. How can you care for yourself at home?   · Use moisturizer at least twice a day. · If your doctor prescribes a cream, use it as directed. If your doctor prescribes other medicine, take it exactly as directed. · Wash the affected area with warm (not hot) water only. Soap can make dryness and itching worse. Pat dry. · Apply a moisturizer after bathing. Use a cream such as Cetaphil, Lubriderm, or Moisturel that does not irritate the skin or cause a rash. Apply the cream while your skin is still damp after lightly drying with a towel. · Use cold, wet cloths to reduce itching. · Keep cool, and stay out of the sun. · If itching affects your sleep, ask your doctor if you can take an antihistamine that might reduce itching and make you sleepy, such as diphenhydramine (Benadryl). Be safe with medicines. Read and follow all instructions on the label. · Control scratching. Keep your fingernails trimmed and smooth to prevent damage to the skin when you scratch it. Wearing cotton mittens or gloves can help you stop scratching. · Try to avoid things that trigger your rash. These may include things like allergens, such as pollen or animal dander. Harsh soaps, scratchy clothes, stress, and some foods are other examples. When should you call for help? Call your doctor now or seek immediate medical care if:    · Your rash gets worse and you have a fever.     · You have new blisters or bruises, or the rash spreads and looks like a sunburn.     · You have signs of infection, such as:  ? Increased pain, swelling, warmth, or redness. ? Red streaks leading from the rash. ? Pus draining from the rash. ? A fever.     · You have crusting or oozing sores.     · You have joint aches or body aches along with your rash. Watch closely for changes in your health, and be sure to contact your doctor if:    · Your rash does not clear up after 2 to 3 weeks of home treatment.     · Itching interferes with your sleep or daily activities. Where can you learn more?   Go to https://chpepiceweb.[email or web address] org and sign in to your [email or web address] account. Enter D939 in the [facility] box to learn more about \"Atopic Dermatitis: Care Instructions. \"     If you do not have an account, please click on the \"Sign Up Now\" link. Current as of: March 3, 2021               Content Version: 13.1  © 2006-2021 [employer]. Care instructions adapted under license by [facility] ([facility]). If you have questions about a medical condition or this instruction, always ask your healthcare professional. [place] [address] any warranty or liability for your use of this information. Patient Education        Pinkeye: Care Instructions  Your Care Instructions     Pinkeye is redness and swelling of the eye surface and the conjunctiva (the lining of the eyelid and the covering of the white part of the eye). Pinkeye is also called conjunctivitis. Pinkeye is often caused by infection with bacteria or a virus. Dry air, allergies, smoke, and chemicals are other common causes. Pinkeye often clears on its own in 7 to 10 days. Antibiotics only help if the pinkeye is caused by bacteria. Pinkeye caused by infection spreads easily. If an allergy or chemical is causing pinkeye, it will not go away unless you can avoid whatever is causing it. Follow-up care is a key part of your treatment and safety. Be sure to make and go to all appointments, and call your doctor if you are having problems. It's also a good idea to know your test results and keep a list of the medicines you take. How can you care for yourself at home? · Wash your hands often. Always wash them before and after you treat pinkeye or touch your eyes or face. · Use moist cotton or a clean, wet cloth to remove crust. Wipe from the inside corner of the eye to the outside. Use a clean part of the cloth for each wipe. · Put cold or warm wet cloths on your eye a few times a day if the eye hurts.   · Do not wear contact lenses or eye makeup until the pinkeye is gone. Throw away any eye makeup you were using when you got pinkeye. Clean your contacts and storage case. If you wear disposable contacts, use a new pair when your eye has cleared and it is safe to wear contacts again. · If the doctor gave you antibiotic ointment or eyedrops, use them as directed. Use the medicine for as long as instructed, even if your eye starts looking better soon. Keep the bottle tip clean, and do not let it touch the eye area. · To put in eyedrops or ointment:  ? Tilt your head back, and pull your lower eyelid down with one finger. ? Drop or squirt the medicine inside the lower lid. ? Close your eye for 30 to 60 seconds to let the drops or ointment move around. ? Do not touch the ointment or dropper tip to your eyelashes or any other surface. · Do not share towels, pillows, or washcloths while you have pinkeye. When should you call for help? Call your doctor now or seek immediate medical care if:    · You have pain in your eye, not just irritation on the surface.     · You have a change in vision or loss of vision.     · You have an increase in discharge from the eye.     · Your eye has not started to improve or begins to get worse within 48 hours after you start using antibiotics.     · Pinkeye lasts longer than 7 days. Watch closely for changes in your health, and be sure to contact your doctor if you have any problems. Where can you learn more? Go to [email or web address].[employer] Portal [employer]). org and sign in to your [employer] account. Enter Y392 in the [employer] box to learn more about \"Pinkeye: Care Instructions. \"     If you do not have an account, please click on the \"Sign Up Now\" link. Current as of: July 1, 2021               Content Version: 13.1  © 5946-0901 [employer]. Care instructions adapted under license by [facility] ([facility]).  If you have questions about a medical condition or this instruction, always ask

## 2022-02-14 NOTE — TELEPHONE ENCOUNTER
Received call from Juan Bethea at [facility] with [employer]. Subjective: Caller states \"swelling under eyes, puffyness\"     Current Symptoms: puffy and swelling under eyes     Onset: 4 days ago; sudden    Associated Symptoms: NA    Pain Severity: 5/10; burning; constant    Temperature: n/a  has not taken temp     What has been tried: Benadryl     LMP: NA Pregnant: NA    Recommended disposition: to be seen today     Care advice provided, patient verbalizes understanding; denies any other questions or concerns; instructed to call back for any new or worsening symptoms. Writer provided warm transfer to Thuy Echeverria at [facility] for appointment scheduling     Attention Provider: Thank you for allowing me to participate in the care of your patient. The patient was connected to triage in response to information provided to the ECC/PSC. Please do not respond through this encounter as the response is not directed to a shared pool.         Reason for Disposition   [1] SEVERE eyelid swelling (i.e., shut or almost) AND [2] involves both eyes (Exception: itchy eyes, which  are probably an allergic reaction)    Protocols used: EYE - [facility]

## 2022-02-14 NOTE — PROGRESS NOTES
[place] 25 [place]  [address] [facility]  Phone: [phone]  Fax: [facility] Prater    Pt Name: Shelly Lr  MRN: [id number]  Krystalgfzarnia 1980  Date of evaluation: 2/14/2022  Provider: Elias Mata PA-C     CHIEF COMPLAINT       Chief Complaint   Patient presents with    Facial Swelling     bilateral eye swelling. Has been taking Benedryl. They were worse on Sat/Sunday. Also, tried tea bags. HISTORY OF PRESENT ILLNESS  (Location/Symptom, Timing/Onset, Context/Setting, Quality, Duration, Modifying Factors, Severity.)   Shelly Lr is a 39 y.o. White (non-) [1] female who presents to the office for evaluation of      Patient complains of bilateral eye swelling. States she has used new eye serum as well as new shampoo in the last week. This is potentially an allergic reaction. Nursing Notes were reviewed. REVIEW OF SYSTEMS    (2-9 systems for level 4, 10 or more for level 5)     Review of Systems   Constitutional: Negative for chills, diaphoresis and fatigue. HENT: Negative. Eyes:        Swelling and itching of the periorbital region. Mild pain and irritation. Respiratory: Negative. Cardiovascular: Negative. Gastrointestinal: Negative. Genitourinary: Negative. Musculoskeletal: Negative. Except as noted above the remainder of the review of systems was reviewed andnegative. PAST MEDICAL HISTORY   History reviewed. Past Medical History:   Diagnosis Date    Acne     Allergic rhinitis     Chickenpox     Depression     Esophageal reflux     Functional dyspepsia     Headache(784.0)     HPV (human papilloma virus) anogenital infection     Hyperlipidemia     Syncope     Unspecified constipation     Unspecified sleep apnea          SURGICAL HISTORY     History reviewed.     Past Surgical History:   Procedure Laterality Date    BREAST SURGERY Left     cyst removed    COLONOSCOPY N/A 05/14/2014    RECTAL BIOPSY, normal mucosa    COLPOSCOPY  2017    INTRAUTERINE DEVICE INSERTION  05/13/2015    Mirena    NV COLPOSCOPY,CERVIX W/ADJ VAG,W/LOOP BX N/A 7/5/2018    LE[facility] performed by Fritz [facility] DO at [facility] ENDOSCOPY  12/10/2014    Small hiatal hernia. esophageal ring     WISDOM TOOTH EXTRACTION           CURRENT MEDICATIONS       Current Outpatient Medications   Medication Sig Dispense Refill    predniSONE (DELTASONE) 20 MG tablet Take 1 tablet by mouth 2 times daily for 5 days 10 tablet 0    trimethoprim-polymyxin b (POLYTRIM) 81368-3.1 UNIT/ML-% ophthalmic solution Place 1 drop into both eyes every 4 hours for 7 days 1 each 0    Lactobacillus (ACIDOPHILUS/L-SPOROGENES) TABS TAKE 1 TABLET BY MOUTH DAILY 30 tablet 5    naproxen (NAPROSYN) 500 MG tablet TAKE 1 TABLET BY MOUTH 2 TIMES DAILY (WITH MEALS) 60 tablet 5    atorvastatin (LIPITOR) 40 MG tablet TAKE 1 TABLET BY MOUTH DAILY 90 tablet 3    amitriptyline (ELAVIL) 10 MG tablet Take 150 mg by mouth nightly       triamcinolone (KENALOG) 0.1 % ointment       guaiFENesin (MUCINEX) 600 MG extended release tablet Take 600 mg by mouth every 12 hours as needed      levonorgestrel (MIRENA) IUD 52 mg 1 each by Intrauterine route      pantoprazole (PROTONIX) 40 MG tablet Take 40 mg by mouth 2 times daily      topiramate (TOPAMAX) 50 MG tablet   3    SUMAtriptan (IMITREX) 50 MG tablet Take 50 mg by mouth once as needed for Migraine      sucralfate (CARAFATE) 1 GM tablet TAKE 1 TABLET 4 TIMES DAILY (Patient taking differently: 2 times daily ) 120 tablet 1    EPINEPHrine (EPIPEN) 0.3 MG/0.3ML SOAJ injection Inject 0.3 mg into the muscle as needed      fexofenadine (ALLEGRA) 180 MG tablet Take 180 mg by mouth daily.       folic acid (FOLVITE) 1 MG tablet TAKE 1 TABLET BY MOUTH DAILY 90 tablet 1    fluticasone (FLONASE) 50 MCG/ACT nasal spray 1 spray by Nasal route every morning (before breakfast) 1 Bottle 5    ondansetron (ZOFRAN-ODT) 4 MG disintegrating tablet Take 1 tablet by mouth every 8 hours as needed for Nausea or Vomiting (Patient not taking: Reported on 2022) 5 tablet 0     No current facility-administered medications for this visit. ALLERGIES     Other, Ibuprofen, and Pollen extract    FAMILY HISTORY           Problem Relation Age of Onset    Parkinsonism Other     Diabetes Mother     Cancer Mother         Breast    Hypertension Mother     Other Mother         pacemaker    Heart Attack Mother     Arthritis Father     Heart Disease Maternal Grandmother     Diabetes Maternal Grandmother     Cancer Maternal Grandfather     Diabetes Paternal Grandmother     Heart Disease Paternal Grandmother     Diabetes Paternal Grandfather     Heart Disease Paternal Grandfather      Family Status   Relation Name Status    Other [facility]     Mother  (Not Specified)    Father  (Not Specified)    MGM  (Not Specified)    MGF  (Not Specified)    PGM  (Not Specified)    PGF  (Not Specified)          SOCIAL HISTORY      reports that she has never smoked. She has never used smokeless tobacco. She reports current alcohol use. She reports that she does not use drugs. PHYSICAL EXAM    (up to 7 for level 4, 8 or more for level 5)     Vitals:    22 1815   BP: 110/64   Site: Left Upper Arm   Position: Sitting   Cuff Size: Medium Adult   Pulse: 80   Temp: 98.6 °F (37 °C)   SpO2: 99%   Weight: 194 lb 6.4 oz (88.2 kg)         Physical Exam  Vitals and nursing note reviewed. Constitutional:       General: She is not in acute distress. Appearance: Normal appearance. She is not ill-appearing. HENT:      Head: Normocephalic and atraumatic. Right Ear: External ear normal.      Left Ear: External ear normal.      Nose: Nose normal.      Mouth/Throat:      Mouth: Mucous membranes are moist.   Eyes:      General: Vision grossly intact.          Right eye: No foreign body. Left eye: No foreign body. Extraocular Movements: Extraocular movements intact. Conjunctiva/sclera:      Right eye: Right conjunctiva is not injected. No hemorrhage. Left eye: Left conjunctiva is not injected. No hemorrhage. Pupils: Pupils are equal, round, and reactive to light. Comments: Mild periorbital swelling with very mild erythema noted under the left eye. No pain with movement of eyes   Pulmonary:      Effort: Pulmonary effort is normal.   Abdominal:      Palpations: Abdomen is soft. Skin:     General: Skin is warm and dry. Neurological:      Mental Status: She is alert and oriented to person, place, and time. DIFFERENTIAL DIAGNOSIS:       Rommel Bonilla reviewed the disposition diagnosis with the patient and or their family/guardian. I have answered their questions and given discharge instructions. They voiced understanding of these instructions and did not have anyfurther questions or complaints. PROCEDURES:  No orders of the defined types were placed in this encounter. No results found for this visit on 02/14/22. FINALIMPRESSION      Visit Diagnoses and Associated Orders     Dermatitis    -  Primary         Acute conjunctivitis, unspecified acute conjunctivitis type, unspecified laterality             ORDERS WITHOUT AN ASSOCIATED DIAGNOSIS    predniSONE (DELTASONE) 20 MG tablet [6496]      trimethoprim-polymyxin b (POLYTRIM) 12106-8.1 UNIT/ML-% ophthalmic solution [03286]              PLAN     Return if symptoms worsen or fail to improve.       DISCHARGEMEDICATIONS:  Orders Placed This Encounter   Medications    predniSONE (DELTASONE) 20 MG tablet     Sig: Take 1 tablet by mouth 2 times daily for 5 days     Dispense:  10 tablet     Refill:  0    trimethoprim-polymyxin b (POLYTRIM) 97198-8.1 UNIT/ML-% ophthalmic solution     Sig: Place 1 drop into both eyes every 4 hours for 7 days     Dispense:  1 each     Refill:  0 Plan:  1. Keep the rash clean and dry  2. Apply all medication as prescribed  3. Take all medication as prescribed  4. Patient educated on signs/ symptoms of infection  5. Patient to follow up with PCP or go to the ER if rash persists or gets worse. 6. Patient given educational material  7. Patient understands and is agreeable. Patient inst.ructed to return to the office if symptoms worsen, return, or have any other concerns. Patient understands and is agreeable.          Magdalena Mckenzie PA-C 2/15/2022 12:56 PM

## 2022-02-15 ASSESSMENT — ENCOUNTER SYMPTOMS
RESPIRATORY NEGATIVE: 1
GASTROINTESTINAL NEGATIVE: 1

## 2022-02-15 ASSESSMENT — VISUAL ACUITY: OU: 1

## 2022-02-17 ENCOUNTER — TELEPHONE (OUTPATIENT)
Dept: ORTHOPEDIC SURGERY | Age: 42
End: 2022-02-17

## 2022-02-24 ENCOUNTER — OFFICE VISIT (OUTPATIENT)
Dept: FAMILY MEDICINE CLINIC | Age: 42
End: 2022-02-24
Payer: COMMERCIAL

## 2022-02-24 ENCOUNTER — HOSPITAL ENCOUNTER (OUTPATIENT)
Age: 42
Setting detail: SPECIMEN
Discharge: HOME | End: 2022-02-24

## 2022-02-24 VITALS
SYSTOLIC BLOOD PRESSURE: 102 MMHG | BODY MASS INDEX: 28.73 KG/M2 | WEIGHT: 194 LBS | DIASTOLIC BLOOD PRESSURE: 70 MMHG | HEIGHT: 69 IN

## 2022-02-24 DIAGNOSIS — Z01.89 ROUTINE LAB DRAW: ICD-10-CM

## 2022-02-24 DIAGNOSIS — G43.009 MIGRAINE WITHOUT AURA AND WITHOUT STATUS MIGRAINOSUS, NOT INTRACTABLE: ICD-10-CM

## 2022-02-24 DIAGNOSIS — M25.552 CHRONIC PAIN OF BOTH HIPS: ICD-10-CM

## 2022-02-24 DIAGNOSIS — M25.551 CHRONIC PAIN OF BOTH HIPS: ICD-10-CM

## 2022-02-24 DIAGNOSIS — G89.29 CHRONIC PAIN OF BOTH HIPS: ICD-10-CM

## 2022-02-24 DIAGNOSIS — F32.A DEPRESSION, UNSPECIFIED DEPRESSION TYPE: ICD-10-CM

## 2022-02-24 DIAGNOSIS — Z76.89 ENCOUNTER TO ESTABLISH CARE: Primary | ICD-10-CM

## 2022-02-24 DIAGNOSIS — E55.9 VITAMIN D DEFICIENCY: ICD-10-CM

## 2022-02-24 DIAGNOSIS — R74.8 ELEVATED LIVER ENZYMES: ICD-10-CM

## 2022-02-24 DIAGNOSIS — G89.29 CHRONIC MIDLINE LOW BACK PAIN WITH RIGHT-SIDED SCIATICA: ICD-10-CM

## 2022-02-24 DIAGNOSIS — F41.9 ANXIETY: ICD-10-CM

## 2022-02-24 DIAGNOSIS — G43.811 OTHER MIGRAINE WITH STATUS MIGRAINOSUS, INTRACTABLE: ICD-10-CM

## 2022-02-24 DIAGNOSIS — M54.41 CHRONIC MIDLINE LOW BACK PAIN WITH RIGHT-SIDED SCIATICA: ICD-10-CM

## 2022-02-24 PROBLEM — R55 SYNCOPE, NEAR: Status: ACTIVE | Noted: 2017-10-11

## 2022-02-24 PROBLEM — G44.209 TENSION TYPE HEADACHE: Status: ACTIVE | Noted: 2017-10-11

## 2022-02-24 PROBLEM — R68.89 CUSHINGOID FACIES: Status: ACTIVE | Noted: 2022-02-24

## 2022-02-24 PROBLEM — I95.1 ORTHOSTATIC HYPOTENSION: Status: ACTIVE | Noted: 2017-10-11

## 2022-02-24 PROBLEM — R68.81 EARLY SATIETY: Status: ACTIVE | Noted: 2019-05-23

## 2022-02-24 PROBLEM — T78.2XXA: Status: ACTIVE | Noted: 2017-03-28
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
## 2022-02-24 PROBLEM — E27.9 DISORDER OF ADRENAL GLAND (HCC): Status: ACTIVE | Noted: 2020-08-09

## 2022-02-24 PROBLEM — K30 FUNCTIONAL DYSPEPSIA: Status: ACTIVE | Noted: 2017-11-08

## 2022-02-24 PROBLEM — R10.2 VULVAR PAIN: Status: ACTIVE | Noted: 2021-01-21

## 2022-02-24 PROCEDURE — 99215 OFFICE O/P EST HI 40 MIN: CPT

## 2022-02-24 PROCEDURE — 96127 BRIEF EMOTIONAL/BEHAV ASSMT: CPT

## 2022-02-24 RX ORDER — BUPROPION HYDROCHLORIDE 150 MG/1
150 TABLET ORAL EVERY MORNING
Qty: 30 TABLET | Refills: 0 | Status: SHIPPED | OUTPATIENT
Start: 2022-02-24 | End: 2022-03-24 | Stop reason: SDUPTHER

## 2022-02-24 RX ORDER — DOXYCYCLINE HYCLATE 50 MG/1
CAPSULE ORAL
COMMUNITY
Start: 2021-12-15 | End: 2022-10-06

## 2022-02-24 RX ORDER — RIMEGEPANT SULFATE 75 MG/75MG
1 TABLET, ORALLY DISINTEGRATING ORAL DAILY PRN
Qty: 4 TABLET | Refills: 0 | COMMUNITY
Start: 2022-02-24 | End: 2022-10-06

## 2022-02-24 RX ORDER — NAPROXEN 500 MG/1
500 TABLET ORAL DAILY PRN
Qty: 60 TABLET | Refills: 1 | Status: SHIPPED | OUTPATIENT
Start: 2022-02-24 | End: 2022-07-07

## 2022-02-24 RX ORDER — FLUCONAZOLE 150 MG/1
TABLET ORAL
COMMUNITY
Start: 2021-12-22 | End: 2022-03-24

## 2022-02-24 RX ORDER — ERGOCALCIFEROL 1.25 MG/1
50000 CAPSULE ORAL WEEKLY
Qty: 4 CAPSULE | Refills: 0 | Status: SHIPPED | OUTPATIENT
Start: 2022-02-24 | End: 2022-10-06

## 2022-02-24 RX ORDER — EPINEPHRINE 0.3 MG/.3ML
0.3 INJECTION SUBCUTANEOUS PRN
Qty: 1 EACH | Refills: 1 | Status: SHIPPED | OUTPATIENT
Start: 2022-02-24

## 2022-02-24 RX ORDER — KETOCONAZOLE 20 MG/ML
SHAMPOO TOPICAL
COMMUNITY
Start: 2022-01-31

## 2022-02-24 RX ORDER — TRIAMCINOLONE ACETONIDE 0.25 MG/G
OINTMENT TOPICAL
COMMUNITY
Start: 2022-01-20 | End: 2022-10-06

## 2022-02-24 RX ORDER — DOXYCYCLINE HYCLATE 100 MG/1
CAPSULE ORAL
COMMUNITY
Start: 2022-02-04

## 2022-02-24 RX ORDER — CLOBETASOL PROPIONATE 0.5 MG/G
CREAM TOPICAL DAILY
COMMUNITY

## 2022-02-24 ASSESSMENT — PATIENT HEALTH QUESTIONNAIRE - PHQ9
SUM OF ALL RESPONSES TO PHQ QUESTIONS 1-9: 17
8. MOVING OR SPEAKING SO SLOWLY THAT OTHER PEOPLE COULD HAVE NOTICED. OR THE OPPOSITE, BEING SO FIGETY OR RESTLESS THAT YOU HAVE BEEN MOVING AROUND A LOT MORE THAN USUAL: 0
4. FEELING TIRED OR HAVING LITTLE ENERGY: 3
SUM OF ALL RESPONSES TO PHQ9 QUESTIONS 1 & 2: 3
5. POOR APPETITE OR OVEREATING: 3
6. FEELING BAD ABOUT YOURSELF - OR THAT YOU ARE A FAILURE OR HAVE LET YOURSELF OR YOUR FAMILY DOWN: 2
2. FEELING DOWN, DEPRESSED OR HOPELESS: 2
9. THOUGHTS THAT YOU WOULD BE BETTER OFF DEAD, OR OF HURTING YOURSELF: 1
1. LITTLE INTEREST OR PLEASURE IN DOING THINGS: 1
10. IF YOU CHECKED OFF ANY PROBLEMS, HOW DIFFICULT HAVE THESE PROBLEMS MADE IT FOR YOU TO DO YOUR WORK, TAKE CARE OF THINGS AT HOME, OR GET ALONG WITH OTHER PEOPLE: 1
7. TROUBLE CONCENTRATING ON THINGS, SUCH AS READING THE NEWSPAPER OR WATCHING TELEVISION: 3
3. TROUBLE FALLING OR STAYING ASLEEP: 2
SUM OF ALL RESPONSES TO PHQ QUESTIONS 1-9: 16

## 2022-02-24 ASSESSMENT — ANXIETY QUESTIONNAIRES
7. FEELING AFRAID AS IF SOMETHING AWFUL MIGHT HAPPEN: 2
4. TROUBLE RELAXING: 3
2. NOT BEING ABLE TO STOP OR CONTROL WORRYING: 3
GAD7 TOTAL SCORE: 16
1. FEELING NERVOUS, ANXIOUS, OR ON EDGE: 1
IF YOU CHECKED OFF ANY PROBLEMS ON THIS QUESTIONNAIRE, HOW DIFFICULT HAVE THESE PROBLEMS MADE IT FOR YOU TO DO YOUR WORK, TAKE CARE OF THINGS AT HOME, OR GET ALONG WITH OTHER PEOPLE: SOMEWHAT DIFFICULT
3. WORRYING TOO MUCH ABOUT DIFFERENT THINGS: 3
6. BECOMING EASILY ANNOYED OR IRRITABLE: 3
5. BEING SO RESTLESS THAT IT IS HARD TO SIT STILL: 1

## 2022-02-24 ASSESSMENT — ENCOUNTER SYMPTOMS
WHEEZING: 0
VOMITING: 0
DIARRHEA: 0
SHORTNESS OF BREATH: 0
CONSTIPATION: 0
NAUSEA: 0

## 2022-02-24 ASSESSMENT — COLUMBIA-SUICIDE SEVERITY RATING SCALE - C-SSRS
6. HAVE YOU EVER DONE ANYTHING, STARTED TO DO ANYTHING, OR PREPARED TO DO ANYTHING TO END YOUR LIFE?: NO
1. WITHIN THE PAST MONTH, HAVE YOU WISHED YOU WERE DEAD OR WISHED YOU COULD GO TO SLEEP AND NOT WAKE UP?: NO
2. HAVE YOU ACTUALLY HAD ANY THOUGHTS OF KILLING YOURSELF?: NO

## 2022-02-24 NOTE — PROGRESS NOTES
regards to trying a sample pack of Nurtec. Encouraged physical at exercise best to her ability given the labral tear. Complete labs as ordered. Return in about 4 weeks (around 3/24/2022). Subjective   SUBJECTIVE/OBJECTIVE:  Patient presents today to establish care with new provider in the clinic. She does have quite the extensive medical history. She routinely sees a neurologist for migraine therapies. She sees OB/GYN Dr. Franco Alberts who has referred her to [address] [place] [address],[address] for ongoing vaginal discomfort and was diagnosed with lichen. Seeing Diogo Garcia for right acetabular labrum tear. Getting routine cortisone shots. Going every 3 months for routine care. She also has a gastroenterologist that she sees for her GERD and functional dyspepsia. In the past she is also seeing Dr. Edwin Andrade for orthostatic hypotension. Patient does not feel that her migraines are well managed, she states she has migraines at least 3-4 times a month, lasting from 3 to 4 days at a time. Despite her being on several medications for these, and following with neurologist, I would be happier with better outcomes. We did discuss today that I do not have the ability to stop any of her migraine medications, but that she may reach out to her neurologist for further recommendations. I also offered to send a referral for pain management as I do think she could be a candidate for radiofrequency ablation. I also discussed using Nurtec with patient and them available to give her samples today. Patient states she supposed to go to get her Botox injections with her neurologist next month, and advised that she continues to update her for appropriate management. Panchito Murrieta also wanted to discuss today her concern for weight gain. We did discuss that some of her medications could be contributing to this, including amitriptyline.   She also feels that her depression has been unmanaged and would like to try alternative therapies if possible. She states that she did previously take naproxen intermittently as needed for chronic low back pain and bilateral hip pain. She is requesting a refill for this at this time. She was previously taking folic acid routinely, has been out of this for quite some time. She states that she was told by the OB/GYN that she needed to take this daily, however I have no documentation of any depleted folic acid values. We will recheck this today    Franny Dick did have noted previous elevated liver enzymes that were drawn by her GI physician. She states she was unaware of this. She denies any concerns at this time, however I do feel be reasonable to recheck these lab values. She also had very low vitamin D noted on a previous lab draw, that she states she was unaware of, and has not been currently taking a supplementation. Review of Systems   Constitutional: Negative for activity change, fatigue, fever and unexpected weight change. HENT: Negative for congestion and dental problem. Respiratory: Negative for shortness of breath and wheezing. Cardiovascular: Negative for chest pain and palpitations. Gastrointestinal: Negative for constipation, diarrhea, nausea and vomiting. Dyspepsia well managed     Genitourinary: Negative for dysuria and urgency. Musculoskeletal: Positive for arthralgias and myalgias. Negative for joint swelling. Labrum tear     Skin: Negative for rash and wound. Neurological: Positive for headaches ( frequent migraines- sees Neurology). Negative for weakness. Psychiatric/Behavioral: Positive for dysphoric mood. Negative for sleep disturbance. The patient is nervous/anxious. Objective   Physical Exam  Constitutional:       General: She is not in acute distress. Appearance: Normal appearance. She is not ill-appearing. Cardiovascular:      Rate and Rhythm: Normal rate and regular rhythm. Heart sounds: Normal heart sounds.  No murmur heard.      Pulmonary:      Effort: Pulmonary effort is normal. No respiratory distress. Breath sounds: Normal breath sounds. Skin:     General: Skin is warm and dry. Capillary Refill: Capillary refill takes less than 2 seconds. Neurological:      Mental Status: She is alert and oriented to person, place, and time. Motor: No weakness. Psychiatric:         Attention and Perception: Attention normal.         Mood and Affect: Mood normal. Mood is not anxious or depressed. Speech: Speech normal.         Behavior: Behavior normal. Behavior is cooperative. Thought Content: Thought content normal.       PHQ Scores 2/24/2022 1/17/2018   PHQ2 Score 3 2   PHQ9 Score 17 2     Interpretation of Total Score Depression Severity: 1-4 = Minimal depression, 5-9 = Mild depression, 10-14 = Moderate depression, 15-19 = Moderately severe depression, 20-27 = Severe depression    HONEY 7 SCORE 2/24/2022   HONEY-7 Total Score 16     Interpretation of HONEY-7 score: 5-9 = mild anxiety, 10-14 = moderate anxiety, 15+ = severe anxiety. Recommend referral to behavioral health for scores 10 or greater. On this date 2/24/2022 I have spent 45 minutes reviewing previous notes, test results and face to face with the patient discussing the diagnosis and importance of compliance with the treatment plan as well as documenting on the day of the visit. An electronic signature was used to authenticate this note.     --ROSELINE Amin - CNP

## 2022-02-25 LAB
ALBUMIN SERPL-MCNC: 4.4 G/DL (ref 3.5–5.2)
ALBUMIN/GLOBULIN RATIO: 1.7 (ref 1–2.5)
ALP BLD-CCNC: 74 U/L (ref 35–104)
ALT SERPL-CCNC: 26 U/L (ref 5–33)
ANION GAP SERPL CALCULATED.3IONS-SCNC: 14 MMOL/L (ref 9–17)
AST SERPL-CCNC: 20 U/L
BILIRUB SERPL-MCNC: 0.36 MG/DL (ref 0.3–1.2)
BUN BLDV-MCNC: 12 MG/DL (ref 6–20)
CALCIUM SERPL-MCNC: 9.5 MG/DL (ref 8.6–10.4)
CHLORIDE BLD-SCNC: 106 MMOL/L (ref 98–107)
CHOLESTEROL, FASTING: 192 MG/DL
CHOLESTEROL/HDL RATIO: 3.5
CO2: 22 MMOL/L (ref 20–31)
CREAT SERPL-MCNC: 0.67 MG/DL (ref 0.5–0.9)
FOLATE: 12 NG/ML
GFR AFRICAN AMERICAN: >60 ML/MIN
GFR NON-AFRICAN AMERICAN: >60 ML/MIN
GFR SERPL CREATININE-BSD FRML MDRD: NORMAL ML/MIN/{1.73_M2}
GLUCOSE FASTING: 78 MG/DL (ref 70–99)
HDLC SERPL-MCNC: 55 MG/DL
LDL CHOLESTEROL: 117 MG/DL (ref 0–130)
POTASSIUM SERPL-SCNC: 5 MMOL/L (ref 3.7–5.3)
SODIUM BLD-SCNC: 142 MMOL/L (ref 135–144)
TOTAL PROTEIN: 7 G/DL (ref 6.4–8.3)
TRIGLYCERIDE, FASTING: 99 MG/DL
VITAMIN B-12: 780 PG/ML (ref 232–1245)

## 2022-03-01 ENCOUNTER — TELEPHONE (OUTPATIENT)
Dept: FAMILY MEDICINE CLINIC | Age: 42
End: 2022-03-01

## 2022-03-01 NOTE — TELEPHONE ENCOUNTER
Attempted to reach patient, lvm.    ----- Message from Denisa Thomas sent at 2/28/2022  3:04 PM EST -----  Subject: Message to Provider    QUESTIONS  Information for Provider? pt is returning calling regarding lab results. pt would like a call back. ---------------------------------------------------------------------------  --------------  Lear How INFO  What is the best way for the office to contact you? OK to leave message on   voicemail  Preferred Call Back Phone Number?  [phone]  ---------------------------------------------------------------------------  --------------  SCRIPT ANSWERS  undefined

## 2022-03-07 DIAGNOSIS — M25.851 FEMOROACETABULAR IMPINGEMENT OF BOTH HIPS: Primary | ICD-10-CM

## 2022-03-07 DIAGNOSIS — M25.852 FEMOROACETABULAR IMPINGEMENT OF BOTH HIPS: Primary | ICD-10-CM

## 2022-03-10 ENCOUNTER — OFFICE VISIT (OUTPATIENT)
Dept: ORTHOPEDIC SURGERY | Age: 42
End: 2022-03-10
Payer: COMMERCIAL

## 2022-03-10 VITALS
BODY MASS INDEX: 28.73 KG/M2 | SYSTOLIC BLOOD PRESSURE: 107 MMHG | DIASTOLIC BLOOD PRESSURE: 74 MMHG | RESPIRATION RATE: 12 BRPM | WEIGHT: 194 LBS | HEIGHT: 69 IN | HEART RATE: 90 BPM

## 2022-03-10 DIAGNOSIS — M70.61 GREATER TROCHANTERIC BURSITIS OF BOTH HIPS: Primary | ICD-10-CM

## 2022-03-10 DIAGNOSIS — M25.851 FEMOROACETABULAR IMPINGEMENT OF BOTH HIPS: ICD-10-CM

## 2022-03-10 DIAGNOSIS — M25.852 FEMOROACETABULAR IMPINGEMENT OF BOTH HIPS: ICD-10-CM

## 2022-03-10 DIAGNOSIS — M70.62 GREATER TROCHANTERIC BURSITIS OF BOTH HIPS: Primary | ICD-10-CM

## 2022-03-10 PROCEDURE — 20611 DRAIN/INJ JOINT/BURSA W/US: CPT | Performed by: PHYSICIAN ASSISTANT

## 2022-03-10 RX ORDER — METHYLPREDNISOLONE ACETATE 40 MG/ML
40 INJECTION, SUSPENSION INTRA-ARTICULAR; INTRALESIONAL; INTRAMUSCULAR; SOFT TISSUE ONCE
Status: COMPLETED | OUTPATIENT
Start: 2022-03-10 | End: 2022-03-10

## 2022-03-10 RX ORDER — LIDOCAINE HYDROCHLORIDE 10 MG/ML
4 INJECTION, SOLUTION INFILTRATION; PERINEURAL ONCE
Status: COMPLETED | OUTPATIENT
Start: 2022-03-10 | End: 2022-03-10

## 2022-03-10 RX ADMIN — METHYLPREDNISOLONE ACETATE 40 MG: 40 INJECTION, SUSPENSION INTRA-ARTICULAR; INTRALESIONAL; INTRAMUSCULAR; SOFT TISSUE at 09:26

## 2022-03-10 RX ADMIN — LIDOCAINE HYDROCHLORIDE 4 ML: 10 INJECTION, SOLUTION INFILTRATION; PERINEURAL at 09:25

## 2022-03-10 ASSESSMENT — ENCOUNTER SYMPTOMS
CHEST TIGHTNESS: 0
RESPIRATORY NEGATIVE: 1
DIARRHEA: 0
COLOR CHANGE: 0
SHORTNESS OF BREATH: 0
ABDOMINAL PAIN: 0
APNEA: 0
NAUSEA: 0
COUGH: 0
ABDOMINAL DISTENTION: 0
VOMITING: 0
CONSTIPATION: 0

## 2022-03-10 NOTE — PROGRESS NOTES
[address]  [facility]  [address]  [facility] [address]  Dept: [phone]  Dept Fax: [phone]        Bilateral Hip Office Visit    Subjective:     Chief Complaint   Patient presents with    Follow-up     B hip pain (LI 11/17/21)     HPI:     Peyton Meehan is a , presents with a 3 year history of pain in the bilateral hips. She has tried a heating pad, home exercises, naproxen and reduction of activity with little improvement. The patient had greater trochanteric bursa cortisone injections on 11/17/2021. She is also had intra-articular hip injections on 12/11/2020 with excellent relief. She has completed physical therapy. ROS:     Review of Systems   Constitutional: Positive for activity change. Negative for appetite change, fatigue and fever. Respiratory: Negative. Negative for apnea, cough, chest tightness and shortness of breath. Cardiovascular: Negative. Negative for chest pain, palpitations and leg swelling. Gastrointestinal: Negative for abdominal distention, abdominal pain, constipation, diarrhea, nausea and vomiting. Genitourinary: Negative for difficulty urinating, dysuria and hematuria. Musculoskeletal: Positive for arthralgias and gait problem. Negative for joint swelling and myalgias. Skin: Negative for color change and rash. Neurological: Negative for dizziness, weakness, numbness and headaches. Psychiatric/Behavioral: Positive for sleep disturbance.        Past Medical History:    Past Medical History:   Diagnosis Date    Acne     Allergic rhinitis     Chickenpox     Depression     Esophageal reflux     Functional dyspepsia     Headache(784.0)     HPV (human papilloma virus) anogenital infection     Hyperlipidemia     Syncope     Unspecified constipation     Unspecified sleep apnea        Past Surgical History:    Past Surgical History:   Procedure Laterality Date    BREAST SURGERY Left     cyst removed    COLONOSCOPY N/A 05/14/2014    RECTAL BIOPSY, normal mucosa    COLPOSCOPY  2017    INTRAUTERINE DEVICE INSERTION  05/13/2015    Mirena    SC COLPOSCOPY,CERVIX W/ADJ VAG,W/LOOP BX N/A 7/5/2018    LEEP performed by Lynn Pederson DO at [place] [id number] ENDOSCOPY  12/10/2014    Small hiatal hernia. esophageal ring     WISDOM TOOTH EXTRACTION         CurrentMedications:   Current Outpatient Medications   Medication Sig Dispense Refill    ciclopirox (LOPROX) 0.77 % cream       doxycycline hyclate (VIBRAMYCIN) 100 MG capsule       doxycycline (VIBRAMYCIN) 50 MG capsule       fluconazole (DIFLUCAN) 150 MG tablet       ketoconazole (NIZORAL) 2 % shampoo       tretinoin (RETIN-A) 0.025 % cream       triamcinolone (KENALOG) 0.025 % ointment       clobetasol prop emollient base 0.05 % CREA Apply topically daily      EPINEPHrine (EPIPEN) 0.3 MG/0.3ML SOAJ injection Inject 0.3 mLs into the muscle as needed (exercise induce anaphlaxis) 1 each 1    naproxen (NAPROSYN) 500 MG tablet Take 1 tablet by mouth daily as needed for Pain (hip pain) 60 tablet 1    buPROPion (WELLBUTRIN XL) 150 MG extended release tablet Take 1 tablet by mouth every morning 30 tablet 0    Rimegepant Sulfate (NURTEC) 75 MG TBDP Take 1 tablet by mouth daily as needed (onset of migraine) 4 tablet 0    vitamin D (ERGOCALCIFEROL) 1.25 MG (34082 UT) CAPS capsule Take 1 capsule by mouth once a week 4 capsule 0    Lactobacillus (ACIDOPHILUS/L-SPOROGENES) TABS TAKE 1 TABLET BY MOUTH DAILY 30 tablet 5    folic acid (FOLVITE) 1 MG tablet TAKE 1 TABLET BY MOUTH DAILY 90 tablet 1    atorvastatin (LIPITOR) 40 MG tablet TAKE 1 TABLET BY MOUTH DAILY 90 tablet 3    amitriptyline (ELAVIL) 10 MG tablet Take 150 mg by mouth nightly       triamcinolone (KENALOG) 0.1 % ointment       guaiFENesin (MUCINEX) 600 MG extended release tablet Take 600 mg by mouth every 12 hours as needed      levonorgestrel (MIRENA) IUD 52 mg 1 each by Intrauterine route      pantoprazole (PROTONIX) 40 MG tablet Take 40 mg by mouth 2 times daily      fluticasone (FLONASE) 50 MCG/ACT nasal spray 1 spray by Nasal route every morning (before breakfast) 1 Bottle 5    topiramate (TOPAMAX) 50 MG tablet   3    ondansetron (ZOFRAN-ODT) 4 MG disintegrating tablet Take 1 tablet by mouth every 8 hours as needed for Nausea or Vomiting (Patient not taking: Reported on 2/14/2022) 5 tablet 0    SUMAtriptan (IMITREX) 50 MG tablet Take 50 mg by mouth once as needed for Migraine      sucralfate (CARAFATE) 1 GM tablet TAKE 1 TABLET 4 TIMES DAILY (Patient taking differently: 2 times daily ) 120 tablet 1    fexofenadine (ALLEGRA) 180 MG tablet Take 180 mg by mouth daily. No current facility-administered medications for this visit. Allergies: Other, Ibuprofen, and Pollen extract    Social History:   Social History     Socioeconomic History    Marital status: Single     Spouse name: None    Number of children: None    Years of education: None    Highest education level: None   Occupational History    None   Tobacco Use    Smoking status: Never Smoker    Smokeless tobacco: Never Used   Vaping Use    Vaping Use: Never used   Substance and Sexual Activity    Alcohol use: Yes     Alcohol/week: 0.0 standard drinks     Comment: occasional    Drug use: No    Sexual activity: Yes     Partners: Male     Birth control/protection: I.U.D. Other Topics Concern    None   Social History Narrative    None     Social Determinants of Health     Financial Resource Strain:     Difficulty of Paying Living Expenses: Not on file   Food Insecurity:     Worried About Running Out of Food in the Last Year: Not on file    Mike of Food in the Last Year: Not on file   Transportation Needs:     Lack of Transportation (Medical): Not on file    Lack of Transportation (Non-Medical):  Not on file   Physical Activity:     Days of Exercise per Week: Not on file    Minutes of Exercise per Session: Not on file   Stress:     Feeling of Stress : Not on file   Social Connections:     Frequency of Communication with Friends and Family: Not on file    Frequency of Social Gatherings with Friends and Family: Not on file    Attends Jain Services: Not on file    Active Member of Clubs or Organizations: Not on file    Attends Club or Organization Meetings: Not on file    Marital Status: Not on file   Intimate Partner Violence:     Fear of Current or Ex-Partner: Not on file    Emotionally Abused: Not on file    Physically Abused: Not on file    Sexually Abused: Not on file   Housing Stability:     Unable to Pay for Housing in the Last Year: Not on file    Number of [place] in the Last Year: Not on file    Unstable Housing in the Last Year: Not on file       Family History:  Family History   Problem Relation Age of Onset    Parkinsonism Other     Diabetes Mother     Cancer Mother         Breast    Hypertension Mother     Other Mother         pacemaker    Heart Attack Mother     Arthritis Father     Heart Disease Maternal Grandmother     Diabetes Maternal Grandmother     Cancer Maternal Grandfather     Diabetes Paternal Grandmother     Heart Disease Paternal Grandmother     Diabetes Paternal Grandfather     Heart Disease Paternal Grandfather        Vitals:   /74   Pulse 90   Resp 12   Ht 5' 9\" (1.753 m)   Wt 194 lb (88 kg)   BMI 28.65 kg/m²  Body mass index is 28.65 kg/m². Physical Examination:     Orthopedics:    GENERAL: Alert and oriented X3 in no acute distress. SKIN: Intact without lesions or ulcerations. NEURO: Intact to sensory and motor testing. VASC: Capillary refill is less than 3 seconds. Bilateral Hip      GEN: Alert and oriented X 3, in no acute distress.   GAIT: The patient's gait was observed while entering the exam room and was noted to be antalgic. The extremity is in anatomic alignment. SKIN: Intact without rashes, lesions, or ulcerations. No obvious deformity or swelling. NEURO: The patient responds to light touch throughout bilateral LE. Patellar and Achilles reflexes are 2/4. VASC: The bilateral LE is neurovascularly intact with 2/4 DP and 2/4 PT pulses. Brisk capillary refill. ROM: 0 degree flexion contracture, 100 degrees flexion, 45 degrees abduction, 30 degrees adduction, 20 degrees of internal rotation, 30 degrees of external rotation. MUSC: Strength is 5/5 flexion, abduction, internal rotation, external rotation. PALP: The patient is  tender to palpation over the greater trochanter. TEST: Log roll is positive with lateral pain. No apparent leg length discrepancy.  Negative Stenchfield test. negative Impingement test.Negative Trendelenburg test. Negative Jadiel's test. +C sign. Pain with FADIR and AYO  Assessment:     1. Greater trochanteric bursitis of both hips    2. Femoroacetabular impingement of both hips      Procedures:    Procedure: yes    Greater Trochanteric Bursa Injection     Procedure: Ashwini Palacio agreed today for a Corticosteroid injection into the bilateral greater trochanteric bursa. The patient was placed in the lateral position with the affected side up. The point of the maximum tenderness was marked with the closed end of a click type pen. The skin was prepped with betadine in a sterile fashion. Utilizing a [employer] ultrasound unit with a variable frequency linear transducer and sterile technique a 4 cc solution containing 2cc of 1% lidocaine  and 2 cc containing 40 mg of depomedrol was injected into the greater trochanteric bursa with a 22 gauge spinal needle. The wound was cleansed and a Band-Aid was placed. The patient tolerated the procedure without difficulty.  Adverse reactions of the injection was discussed with the patient including signs of infection (increasing pain, redness, swelling) and the patient was instructed to call immediately with any of these symptoms. The images are stored on SD card in the machine until downloaded to the patient's chart. Radiology:   XR HIP RIGHT MIN 4VW W PELVIS    Result Date: 3/10/2022  HIP/PELVIS X-RAY  AP and standing AP of the pelvis and supine AP and frog leg lateral of the bilateral hip radiographs were obtained today and demonstrate joint spacing is within normal limits and visualized articular surfaces are intact. There is no evidence of fracture, dislocation or other significant abnormality. There may be mild hip dysplasia bilaterally. There is crossover signs indicated of pincer type impingement. No significant change since previous x-ray on October 25, 2020. 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 We know she has some signs of femoral acetabular impingement bilaterally. We discussed the etiologies and natural histories of greater trochanteric bursitis and femoral acetabular impingement of bilateral hips. We discussed the various treatment alternatives including anti-inflammatory medications, physical therapy, injections, further imaging studies and as a last result surgery. During today's visit, we discussed that the injections seem to help. She has not been doing her exercises or stretches and advised her to begin doing those again because that will help. The injection seem to help for a few months at a time. We have not done any injections into her intra-articular hip in a long time so we could try that if these greater trochanteric injections do not seem to help as much. The patient has opted for a cortisone injection into the bilateral greater trochanteric bursa to help reduce inflammation and pain.  The injection site should never get red, hot, or swollen and if it does the patient will contact our office right away. The patient may experience a increase in soreness the first 24-48 hours due to a cortisone flair and can take anti-inflammatories for a short period of time to reduce that soreness. The patient should not submerge the injection site in water for a minimum of 24 hours to avoid infection. This means no lakes, pools, ponds, or hot tubs for 24 hours. If the patient is diabetic the injection may increase their blood sugar for up to one week. The patient can do this cortisone injection once every 3 months as needed. If the injections stop working and do not give the patient relief the patient should consider surgical interventions to produce long term relief. She states she will begin doing her stretches again. Patient should return to the clinic in 3 months to follow up with  Milagros Menard PA-C. She can come earlier for interarticular injections if she feels that she needs those. The patient will call the office immediately with any problems. Orders Placed This Encounter   Medications    methylPREDNISolone acetate (DEPO-MEDROL) injection 40 mg    lidocaine 1 % injection 4 mL    methylPREDNISolone acetate (DEPO-MEDROL) injection 40 mg    methylPREDNISolone acetate (DEPO-MEDROL) injection 40 mg    methylPREDNISolone acetate (DEPO-MEDROL) injection 40 mg       No orders of the defined types were placed in this encounter. This note is created with the assistance of a speech recognition program.  While intending to generate a document that actually reflects the content of the visit, the document can still have some errors including those of syntax and sound a like substitutions which may escape proof reading.   In such instances, actual meaning can be extrapolated by contextual diversion      Electronically signed by Brandt Lyle PA-C, on 3/10/2022 at 10:13 AM

## 2022-03-24 ENCOUNTER — OFFICE VISIT (OUTPATIENT)
Dept: FAMILY MEDICINE CLINIC | Age: 42
End: 2022-03-24
Payer: COMMERCIAL

## 2022-03-24 ENCOUNTER — OFFICE VISIT (OUTPATIENT)
Dept: BEHAVIORAL/MENTAL HEALTH CLINIC | Age: 42
End: 2022-03-24
Payer: COMMERCIAL

## 2022-03-24 VITALS
RESPIRATION RATE: 18 BRPM | WEIGHT: 196.4 LBS | OXYGEN SATURATION: 95 % | SYSTOLIC BLOOD PRESSURE: 100 MMHG | TEMPERATURE: 98.3 F | DIASTOLIC BLOOD PRESSURE: 70 MMHG | HEART RATE: 60 BPM | BODY MASS INDEX: 29 KG/M2

## 2022-03-24 DIAGNOSIS — F32.A DEPRESSION, UNSPECIFIED DEPRESSION TYPE: ICD-10-CM

## 2022-03-24 DIAGNOSIS — F41.9 ANXIETY: Primary | ICD-10-CM

## 2022-03-24 DIAGNOSIS — G43.009 MIGRAINE WITHOUT AURA AND WITHOUT STATUS MIGRAINOSUS, NOT INTRACTABLE: ICD-10-CM

## 2022-03-24 DIAGNOSIS — F41.1 GENERALIZED ANXIETY DISORDER: Primary | ICD-10-CM

## 2022-03-24 PROCEDURE — 99213 OFFICE O/P EST LOW 20 MIN: CPT

## 2022-03-24 PROCEDURE — 90791 PSYCH DIAGNOSTIC EVALUATION: CPT | Performed by: SOCIAL WORKER

## 2022-03-24 RX ORDER — ADAPALENE AND BENZOYL PEROXIDE .1; 2.5 G/100G; G/100G
GEL TOPICAL DAILY
COMMUNITY
Start: 2022-03-03

## 2022-03-24 RX ORDER — BUPROPION HYDROCHLORIDE 150 MG/1
150 TABLET ORAL EVERY MORNING
Qty: 90 TABLET | Refills: 1 | Status: SHIPPED | OUTPATIENT
Start: 2022-03-24 | End: 2022-06-23 | Stop reason: ALTCHOICE

## 2022-03-24 ASSESSMENT — ENCOUNTER SYMPTOMS
NAUSEA: 0
WHEEZING: 0
VOMITING: 0
CONSTIPATION: 0
SHORTNESS OF BREATH: 0
DIARRHEA: 0
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
## 2022-03-24 NOTE — PROGRESS NOTES
if she feels that this is necessary. She did use the Nurtec sample that I gave her for her ongoing migraines, and states that it did help slightly reduced the pain last week, however she still needed to use her Imitrex for abrupt management. She is seeing her neurologist in the next couple of weeks. Patient denies any other concerns or complaints at this time, and overall feels that she is doing well. Review of Systems   Constitutional: Negative for activity change, fatigue, fever and unexpected weight change. HENT: Negative for congestion and dental problem. Respiratory: Negative for shortness of breath and wheezing. Cardiovascular: Negative for chest pain and palpitations. Gastrointestinal: Negative for constipation, diarrhea, nausea and vomiting. Dyspepsia well managed     Endocrine: Negative for polyphagia and polyuria. Genitourinary: Negative for dysuria and urgency. Musculoskeletal: Positive for arthralgias and myalgias. Negative for joint swelling. Labrum tear     Skin: Negative for rash and wound. Neurological: Positive for headaches ( frequent migraines- sees Neurology). Negative for weakness. Psychiatric/Behavioral: Positive for dysphoric mood. Negative for sleep disturbance. The patient is nervous/anxious. Notes improvement            Objective   Physical Exam  Constitutional:       General: She is not in acute distress. Appearance: Normal appearance. She is not ill-appearing. HENT:      Right Ear: Tympanic membrane, ear canal and external ear normal. There is no impacted cerumen. Left Ear: Tympanic membrane, ear canal and external ear normal. There is no impacted cerumen. Nose: Nose normal. No congestion or rhinorrhea. Mouth/Throat:      Mouth: Mucous membranes are moist.      Pharynx: Oropharynx is clear. No oropharyngeal exudate.    Eyes:      Conjunctiva/sclera: Conjunctivae normal.   Cardiovascular:      Rate and Rhythm: Normal rate and regular rhythm. Heart sounds: Normal heart sounds. No murmur heard. Pulmonary:      Effort: Pulmonary effort is normal. No respiratory distress. Breath sounds: Normal breath sounds. Abdominal:      General: Abdomen is flat. Bowel sounds are normal.      Palpations: Abdomen is soft. Musculoskeletal:      Cervical back: No tenderness. Lymphadenopathy:      Cervical: No cervical adenopathy. Skin:     General: Skin is warm and dry. Capillary Refill: Capillary refill takes less than 2 seconds. Neurological:      Mental Status: She is alert and oriented to person, place, and time. Motor: No weakness. Psychiatric:         Mood and Affect: Mood normal.         Behavior: Behavior normal.            On this date 3/24/2022 I have spent 25 minutes reviewing previous notes, test results and face to face with the patient discussing the diagnosis and importance of compliance with the treatment plan as well as documenting on the day of the visit. An electronic signature was used to authenticate this note.     --ROSELINE Sanchez - CNP

## 2022-04-07 ENCOUNTER — OFFICE VISIT (OUTPATIENT)
Dept: BEHAVIORAL/MENTAL HEALTH CLINIC | Age: 42
End: 2022-04-07
Payer: COMMERCIAL

## 2022-04-07 DIAGNOSIS — F41.1 GENERALIZED ANXIETY DISORDER: Primary | ICD-10-CM

## 2022-04-07 PROCEDURE — 90834 PSYTX W PT 45 MINUTES: CPT | Performed by: SOCIAL WORKER

## 2022-04-07 NOTE — PROGRESS NOTES
ADULT BEHAVIORAL HEALTH FOLLOW UP  JESSE Jean  Licensed Independent        Visit Date: 4/7/2022   Time of appointment:  [phone]   Time spent with Patient: 40 minutes. This is patient's second appointment. Reason for Consult:  Anxiety and Stress     Referring Provider/PCP:    No ref. provider found  ROSELINE Nagel - CNP      Pt provided informed consent for the behavioral health program. Discussed with patient model of service to include the limits of confidentiality (i.e. abuse reporting, suicide intervention, etc.) and short-term intervention focused approach. Pt indicated understanding. Rio Stone is a 39 y.o. female who presents for follow up of anxiety and stress. She reports increased emotions due to death of family dog, processed feelings and grief about this and ways to cope. She was encouraged to allow her emotions, with reminder crying is not weakness but is our emotions being released. She reports noticing more where her tension is held, we reviewed why this occurs and ways to purposely relax that area thruout the day. She was also encouraged to question her anxiety, with reminder anxiety sometimes lies to us and overreacts to stress. Previous Recommendations: Ara Amanda is agreeable to engage in therapy to work on anxiety coping. 1. She will practice relaxation skills provided daily. 2. She will take medications as prescribed. 3. She will follow up with Hector Mendoza in 2 weeks. MENTAL STATUS EXAM  Mood was anxious with anxious and tearful affect. Suicidal ideation was denied. Homicidal ideation was denied. Hygiene was good . Dress was appropriate. Behavior was Within Normal Limits with No observation or self-report of difficulties ambulating. Attitude was Cooperative. Eye-contact was good. Speech: rate - WNL, rhythm - WNL, volume - WNL. Verbalizations were coherent.   Thought processes were intact and goal-oriented without evidence of delusions, hallucinations, obsessions, or jose; with moderate cognitive distortions. Associations were characterized by intact cognitive processes. Pt was oriented to person, place, time, and general circumstances;  recent:  good. Insight and judgment were estimated to be fair, AEB, a fair  understanding of cyclical maladaptive patterns, and the ability to use insight to inform behavior change. ASSESSMENT  Alison Aragon presented to the appointment today for evaluation and treatment of symptoms of anxiety. She is currently deemed no risk to herself or others and meets criteria for HONEY. She was in agreement with recommendations. PHQ Scores 2/24/2022 1/17/2018   PHQ2 Score 3 2   PHQ9 Score 17 2     Interpretation of Total Score Depression Severity: 1-4 = Minimal depression, 5-9 = Mild depression, 10-14 = Moderate depression, 15-19 = Moderately severe depression, 20-27 = Severe depression    How often pt has had thoughts of death or hurting self (if PHQ positive for depression):       HONEY 7 SCORE 2/24/2022   HONEY-7 Total Score 16     Interpretation of HONEY-7 score: 5-9 = mild anxiety, 10-14 = moderate anxiety, 15+ = severe anxiety. Recommend referral to behavioral health for scores 10 or greater. DIAGNOSIS  Osbaldo Mckay was seen today for anxiety and stress. Diagnoses and all orders for this visit:    Generalized anxiety disorder          INTERVENTION  Trained in strategies for increasing balanced thinking, Trained in relaxation strategies, Discussed self-care (sleep, nutrition, rewarding activities, social support, exercise), Discussed potential barriers to change, Established rapport, Supportive techniques, CBT to target anxiety and Identified maladaptive thoughts      PLAN  Osbaldo Mckay will continue to notice her tension and use relaxation skills to adjust. She will follow up with Jose Martinez in 3 weeks. INTERACTIVE COMPLEXITY  Is interactive complexity present?   No  Reason: N/A  Additional Supporting Information:  N/A       Electronically signed by JESSE Aviles on 4/7/22 at 2:34 PM EDT

## 2022-04-22 DIAGNOSIS — E55.9 VITAMIN D DEFICIENCY: ICD-10-CM

## 2022-04-22 NOTE — TELEPHONE ENCOUNTER
Last visit: 03/24/22  Last Med refill: 02/24/22  Does patient have enough medication for 72 hours: Yes    Next Visit Date:  Future Appointments   Date Time Provider Ras Rodas   4/28/2022  9:30 AM JESSE Leal psyc [facility]   6/9/2022  8:00 AM Nadir López PA-C [facility]   6/23/2022 10:00 AM Monica Frank, APRN - CNP [facility]       Health Maintenance   Topic Date Due    Varicella vaccine (1 of 2 - 2-dose childhood series) Never done    Lipids  02/24/2023    Depression Monitoring  02/24/2023    DTaP/Tdap/Td vaccine (2 - Td or Tdap) 10/16/2024    Cervical cancer screen  06/24/2026    Flu vaccine  Completed    COVID-19 Vaccine  Completed    Hepatitis C screen  Completed    HIV screen  Completed    Hepatitis A vaccine  Aged Out    Hepatitis B vaccine  Aged Out    Hib vaccine  Aged Out    Meningococcal (ACWY) vaccine  Aged Out    Pneumococcal 0-64 years Vaccine  Aged Out       No results found for: LABA1C          ( goal A1C is < 7)   No results found for: LABMICR  LDL Cholesterol (mg/dL)   Date Value   02/24/2022 117   02/06/2020 86       (goal LDL is <100)   AST (U/L)   Date Value   02/24/2022 20     ALT (U/L)   Date Value   02/24/2022 26     BUN (mg/dL)   Date Value   02/24/2022 12     BP Readings from Last 3 Encounters:   03/24/22 100/70   03/10/22 107/74   02/24/22 102/70          (goal 120/80)    All Future Testing planned in [facility]  Lab Frequency Next Occurrence   PAP SMEAR Once 06/24/2021               Patient Active Problem List:     Allergic rhinitis     GERD (gastroesophageal reflux disease)     Migraine without aura     Constipation     Generalized anxiety disorder     Hyperlipidemia     High risk HPV infection     Vasovagal syncope     Lichen sclerosus     Dysplasia of cervix, high grade CAMERON 2     Vulvar intraepithelial neoplasia (NELIDA) grade 1     S/P LEEP 7/5/18 with IUD removal     Left ovarian cyst     Other fatigue     Tension type headache     Syncope, near     Pain in left hip     Other chronic pain     Orthostatic hypotension     Lumbago with sciatica, right side     Functional dyspepsia     Exercise anaphylaxis     Early satiety     Disorder of adrenal gland (HCC)     Cushingoid facies     Vulvar pain

## 2022-04-25 RX ORDER — ERGOCALCIFEROL 1.25 MG/1
50000 CAPSULE ORAL WEEKLY
Qty: 4 CAPSULE | Refills: 0 | OUTPATIENT
Start: 2022-04-25

## 2022-04-25 RX ORDER — L. ACIDOPHILUS/LACTOBAC SPOR 35MM-25MM
TABLET ORAL
Qty: 30 TABLET | Refills: 5 | Status: SHIPPED | OUTPATIENT
Start: 2022-04-25

## 2022-04-26 NOTE — TELEPHONE ENCOUNTER
Please let patient know after her weekly doses, she should just take an over the counter vitamin D3 supplement daily of 2,000iU. We will repeat her Vitamin D level at her next visit.

## 2022-04-28 ENCOUNTER — OFFICE VISIT (OUTPATIENT)
Dept: BEHAVIORAL/MENTAL HEALTH CLINIC | Age: 42
End: 2022-04-28
Payer: COMMERCIAL

## 2022-04-28 DIAGNOSIS — F41.1 GENERALIZED ANXIETY DISORDER: Primary | ICD-10-CM

## 2022-04-28 PROCEDURE — 90834 PSYTX W PT 45 MINUTES: CPT | Performed by: SOCIAL WORKER

## 2022-04-28 NOTE — PROGRESS NOTES
ADULT BEHAVIORAL HEALTH FOLLOW UP  JESSE Holcomb  Licensed Independent        Visit Date: 4/28/2022   Time of appointment:  [phone]   Time spent with Patient: 45 minutes. This is patient's third appointment. Reason for Consult:  Anxiety and Stress     Referring Provider/PCP:    No ref. provider found  ROSELINE Mosley CNP      Pt provided informed consent for the behavioral health program. Discussed with patient model of service to include the limits of confidentiality (i.e. abuse reporting, suicide intervention, etc.) and short-term intervention focused approach. Pt indicated understanding. Miguel Evans is a 39 y.o. female who presents for follow up of anxiety. We processed anxiety triggers, with focus on anxious thoughts, especially worst case scenario and interpretations. We reviewed skills of defusion and grounding in order to adjust how she interacts with those thoughts, with reminder we cant stop them from coming we can change how we deal with them. Her anxiety in uncertain situations was validated, we processed options for this. Previous Recommendations: Froylan Marieed will continue to notice her tension and use relaxation skills to adjust. She will follow up with Sharita Juan in 3 weeks. MENTAL STATUS EXAM  Mood was anxious with anxious affect. Suicidal ideation was denied. Homicidal ideation was denied. Hygiene was good . Dress was appropriate. Behavior was Within Normal Limits with No observation or self-report of difficulties ambulating. Attitude was Cooperative. Eye-contact was good. Speech: rate - WNL, rhythm - WNL, volume - WNL. Verbalizations were coherent. Thought processes were intact and goal-oriented without evidence of delusions, hallucinations, obsessions, or jose; with moderate cognitive distortions. Associations were characterized by intact cognitive processes.   Pt was oriented to person, place, time, and general circumstances;  recent:  good. Insight and judgment were estimated to be fair, AEB, a fair  understanding of cyclical maladaptive patterns, and the ability to use insight to inform behavior change. ASSESSMENT  Jean Carlos Lugo presented to the appointment today for evaluation and treatment of symptoms of anxiety. She is currently deemed no risk to herself or others and meets criteria for HONEY. She was in agreement with recommendations. PHQ Scores 2/24/2022 1/17/2018   PHQ2 Score 3 2   PHQ9 Score 17 2     Interpretation of Total Score Depression Severity: 1-4 = Minimal depression, 5-9 = Mild depression, 10-14 = Moderate depression, 15-19 = Moderately severe depression, 20-27 = Severe depression    How often pt has had thoughts of death or hurting self (if PHQ positive for depression):       HONEY 7 SCORE 2/24/2022   HONEY-7 Total Score 16     Interpretation of HONEY-7 score: 5-9 = mild anxiety, 10-14 = moderate anxiety, 15+ = severe anxiety. Recommend referral to behavioral health for scores 10 or greater. DIAGNOSIS  Wing Evangelista was seen today for anxiety and stress. Diagnoses and all orders for this visit:    Generalized anxiety disorder          INTERVENTION  Trained in strategies for increasing balanced thinking, Discussed and set plan for behavioral activation, Discussed self-care (sleep, nutrition, rewarding activities, social support, exercise), Discussed potential barriers to change, Established rapport, Supportive techniques and Provided Psychoeducation re: defusion      PLAN  Wing Evangelista will try different defusion skills for worry thoughts. She will follow up with Kaitlin Carcamo in 2 weeks. INTERACTIVE COMPLEXITY  Is interactive complexity present?   No  Reason:  N/A  Additional Supporting Information:  N/A       Electronically signed by JESSE Nation on 4/28/22 at 9:31 AM EDT

## 2022-05-12 ENCOUNTER — OFFICE VISIT (OUTPATIENT)
Dept: BEHAVIORAL/MENTAL HEALTH CLINIC | Age: 42
End: 2022-05-12
Payer: COMMERCIAL

## 2022-05-12 DIAGNOSIS — F41.1 GENERALIZED ANXIETY DISORDER: Primary | ICD-10-CM

## 2022-05-12 PROCEDURE — 90834 PSYTX W PT 45 MINUTES: CPT | Performed by: SOCIAL WORKER

## 2022-05-12 NOTE — PROGRESS NOTES
ADULT BEHAVIORAL HEALTH FOLLOW UP  JESSE Arauz  Licensed Independent        Visit Date: 5/12/2022   Time of appointment:  [phone]   Time spent with Patient: 45 minutes. This is patient's fourth appointment. Reason for Consult:  Anxiety     Referring Provider/PCP:    No ref. provider found  ROSELINE Bernardo CNP      Pt provided informed consent for the behavioral health program. Discussed with patient model of service to include the limits of confidentiality (i.e. abuse reporting, suicide intervention, etc.) and short-term intervention focused approach. Pt indicated understanding. Cristopher Zhang is a 39 y.o. female who presents for follow up of anxiety. She reports high anxiety and worry thoughts around her relationship, uncertainty of the situation and future. We processed communication options for this, with reminder the conversation will be scary, she can still face this in order to receive peace of mind either way. She reports a lot of what if thoughts, we processed defusion skills to let go of those thoughts, with reminder they will return and she can be mindful about what she focuses on. Previous Recommendations: Sylvester Jalloh will try different defusion skills for worry thoughts. She will follow up with Lucia Mares in 2 weeks. MENTAL STATUS EXAM  Mood was anxious with anxious and tearful affect. Suicidal ideation was denied. Homicidal ideation was denied. Hygiene was good . Dress was appropriate. Behavior was Within Normal Limits with No observation or self-report of difficulties ambulating. Attitude was Cooperative. Eye-contact was good. Speech: rate - WNL, rhythm - WNL, volume - WNL. Verbalizations were coherent. Thought processes were intact and goal-oriented without evidence of delusions, hallucinations, obsessions, or jose; with moderate cognitive distortions. Associations were characterized by intact cognitive processes.   Pt was oriented to person, place, time, and general circumstances;  recent:  good and remote:  good. Insight and judgment were estimated to be fair, AEB, a fair  understanding of cyclical maladaptive patterns, and the ability to use insight to inform behavior change. ASSESSMENT  Andrea Leon presented to the appointment today for evaluation and treatment of symptoms of anxiety. She is currently deemed no risk to herself or others and meets criteria for HONEY. She was in agreement with recommendations. PHQ Scores 2/24/2022 1/17/2018   PHQ2 Score 3 2   PHQ9 Score 17 2     Interpretation of Total Score Depression Severity: 1-4 = Minimal depression, 5-9 = Mild depression, 10-14 = Moderate depression, 15-19 = Moderately severe depression, 20-27 = Severe depression    How often pt has had thoughts of death or hurting self (if PHQ positive for depression):       HONEY 7 SCORE 2/24/2022   HONEY-7 Total Score 16     Interpretation of HONEY-7 score: 5-9 = mild anxiety, 10-14 = moderate anxiety, 15+ = severe anxiety. Recommend referral to behavioral health for scores 10 or greater. DIAGNOSIS  Antonietta Pate was seen today for anxiety. Diagnoses and all orders for this visit:    Generalized anxiety disorder          INTERVENTION  Trained in strategies for increasing balanced thinking, Trained in improving communication skills, Discussed self-care (sleep, nutrition, rewarding activities, social support, exercise), Established rapport, Supportive techniques, Provided Psychoeducation re: defusion, boundaries and Identified maladaptive thoughts      PLAN  Antonietta Pate will utilize mindfulness and defusion for what if thoughts. She will follow up with Mikala Miller in 3 weeks. INTERACTIVE COMPLEXITY  Is interactive complexity present?   No  Reason:  N/A  Additional Supporting Information:  N/A       Electronically signed by JESSE Ruiz on 5/12/22 at 9:39 AM EDT

## 2022-06-02 ENCOUNTER — OFFICE VISIT (OUTPATIENT)
Dept: BEHAVIORAL/MENTAL HEALTH CLINIC | Age: 42
End: 2022-06-02
Payer: COMMERCIAL

## 2022-06-02 DIAGNOSIS — F41.1 GENERALIZED ANXIETY DISORDER: Primary | ICD-10-CM

## 2022-06-02 PROCEDURE — 90834 PSYTX W PT 45 MINUTES: CPT | Performed by: SOCIAL WORKER

## 2022-06-02 NOTE — PROGRESS NOTES
ADULT BEHAVIORAL HEALTH FOLLOW UP  KOFI Sheridan-S  Licensed Independent        Visit Date: 6/2/2022   Time of appointment:  [phone]   Time spent with Patient: 39 minutes. This is patient's fifth appointment. Reason for Consult:  Anxiety and Stress     Referring Provider/PCP:    No ref. provider found  ROSELINE Lloyd CNP      Pt provided informed consent for the behavioral health program. Discussed with patient model of service to include the limits of confidentiality (i.e. abuse reporting, suicide intervention, etc.) and short-term intervention focused approach. Pt indicated understanding. Lyn Downing is a 39 y.o. female who presents for follow up of anxiety and stress. She reviewed updates in relationship, feeling better about this, still some questions. We processed communication skills to obtain more clarity, with reminder sometimes we need to be vulnerable to be closer. She reports continued struggle with what if thoughts, we processed skills for this, with reminder to catch them as they occur and remind herself they are serving no purpose. Previous Recommendations: Allan Rueda will utilize mindfulness and defusion for what if thoughts. She will follow up with J Luis Aquino in 3 weeks. MENTAL STATUS EXAM  Mood was within normal limits with calm affect. Suicidal ideation was denied. Homicidal ideation was denied. Hygiene was good . Dress was appropriate. Behavior was Within Normal Limits with No observation or self-report of difficulties ambulating. Attitude was Cooperative. Eye-contact was good. Speech: rate - WNL, rhythm - WNL, volume - WNL. Verbalizations were coherent. Thought processes were intact and goal-oriented without evidence of delusions, hallucinations, obsessions, or jose; with moderate cognitive distortions. Associations were characterized by intact cognitive processes.   Pt was oriented to person, place, time, and general circumstances;  recent:  good and remote:  good. Insight and judgment were estimated to be fair, AEB, a fair  understanding of cyclical maladaptive patterns, and the ability to use insight to inform behavior change. ASSESSMENT  Boston Maldonado presented to the appointment today for evaluation and treatment of symptoms of anxiety. She is currently deemed no risk to herself or others and meets criteria for HONEY. She was in agreement with recommendations. PHQ Scores 2/24/2022 1/17/2018   PHQ2 Score 3 2   PHQ9 Score 17 2     Interpretation of Total Score Depression Severity: 1-4 = Minimal depression, 5-9 = Mild depression, 10-14 = Moderate depression, 15-19 = Moderately severe depression, 20-27 = Severe depression    How often pt has had thoughts of death or hurting self (if PHQ positive for depression):       HONEY 7 SCORE 2/24/2022   HONEY-7 Total Score 16     Interpretation of HONEY-7 score: 5-9 = mild anxiety, 10-14 = moderate anxiety, 15+ = severe anxiety. Recommend referral to behavioral health for scores 10 or greater. DIAGNOSIS  Abdoul Gray was seen today for anxiety and stress. Diagnoses and all orders for this visit:    Generalized anxiety disorder          INTERVENTION  Trained in strategies for increasing balanced thinking, Trained in relaxation strategies, Trained in improving communication skills, Discussed self-care (sleep, nutrition, rewarding activities, social support, exercise), Discussed potential barriers to change, Established rapport and Supportive techniques      PLAN  Abdoul Gray will notice and neutralize what if thoughts, with reminder they are not helpful, necessary, or true. She will follow up with Eyad Muñoz in 3 weeks. INTERACTIVE COMPLEXITY  Is interactive complexity present?   No  Reason:  N/A  Additional Supporting Information:  N/A       Electronically signed by JESSE Su on 6/2/22 at 9:36 AM EDT

## 2022-06-07 NOTE — PROGRESS NOTES
[address] AND SPORTS MEDICINE  [facility]  [address] [place] [address]  Dept: [phone]  Dept Fax: [phone]        Ambulatory Follow Up      Subjective:   Leeroy Colón is a 39y.o. year old female who presents to our office today for routine followup regarding her   1. Greater trochanteric bursitis of both hips    . Chief Complaint   Patient presents with    Procedure     B hip pain (LI 3/10/22)         HPI Leeroy Colón  is a 39 y.o.  female who presents today in follow for greater trochanteric bursitis of bilateral hips. The patient was last seen on 3/10/2022 and underwent treatment in the form of greater trochanteric bursa injections. She has also had intra-articular hip injections on 12/11/2020 with excellent relief. The patient notes 100% improvement with the previous treatment. Review of Systems   Constitutional: Positive for activity change. Negative for appetite change, fatigue and fever. Respiratory: Negative. Negative for apnea, cough, chest tightness and shortness of breath. Cardiovascular: Negative. Negative for chest pain, palpitations and leg swelling. Gastrointestinal: Negative for abdominal distention, abdominal pain, constipation, diarrhea, nausea and vomiting. Genitourinary: Negative for difficulty urinating, dysuria and hematuria. Musculoskeletal: Positive for arthralgias and gait problem. Negative for joint swelling and myalgias. Skin: Negative for color change and rash. Neurological: Negative for dizziness, weakness, numbness and headaches. Psychiatric/Behavioral: Positive for sleep disturbance. Objective :   /75   Pulse 76   Resp 12   Ht 5' 9\" (1.753 m)   Wt 196 lb (88.9 kg)   BMI 28.94 kg/m²  Body mass index is 28.94 kg/m².   General: Leeroy Colón is a 39 y.o. female who is alert and oriented and sitting comfortably in our office. Bilateral Hip      GEN: Alert and oriented X 3, in no acute distress. GAIT: The patient's gait was observed while entering the exam room and was noted to be antalgic. The extremity is in anatomic alignment. SKIN: Intact without rashes, lesions, or ulcerations. No obvious deformity or swelling. NEURO: The patient responds to light touch throughout bilateral LE. Patellar and Achilles reflexes are 2/4. VASC: The bilateral LE is neurovascularly intact with 2/4 DP and 2/4 PT pulses. Brisk capillary refill. ROM: 0 degree flexion contracture, 100 degrees flexion, 40 degrees abduction, 30 degrees adduction, 20 degrees of internal rotation, 30 degrees of external rotation. MUSC: Strength is 5/5 flexion, abduction, internal rotation, external rotation. PALP: The patient is tender to palpation over the greater trochanter. TEST: Log roll is positive with lateral pain. No apparent leg length discrepancy.  Negative Stenchfield test. negative Impingement test.Negative Trendelenburg test. Negative Jadiel's test. +C sign. Pain with FADIR and AYO    Radiology: Previous x-rays reviewed    Procedure:  Greater Trochanteric Bursa Injection     Procedure: Boston Maldonado agreed today for a Corticosteroid injection into the bilateral greater trochanteric bursa. The patient was placed in the lateral position with the affected side up. The point of the maximum tenderness was marked with the closed end of a click type pen. The skin was prepped with betadine in a sterile fashion. Utilizing a Boundary ultrasound unit with a variable frequency linear transducer and sterile technique a 3 cc solution containing 2cc of 1% lidocaine  and 1 cc containing 80 mg of depomedrol was injected into the greater trochanteric bursa with a 22 gauge spinal needle. The wound was cleansed and a Band-Aid was placed. The patient tolerated the procedure without difficulty.  Adverse reactions of the injection was discussed with the patient including signs of infection (increasing pain, redness, swelling) and the patient was instructed to call immediately with any of these symptoms. The images are stored on SD card in the machine until downloaded to the patient's chart. Assessment:      1. Greater trochanteric bursitis of both hips       Plan:      We discussed the etiologies and natural histories of greater trochanteric bursitis and femoral acetabular impingement of bilateral hips. We discussed the various treatment alternatives including anti-inflammatory medications, physical therapy, injections, further imaging studies and as a last result surgery. During today's visit, we discussed that the bursitis is not causing any issues. We can do injections periodically as needed. Today the patient opted for a cortisone injection into the lateral greater enteric bursa injections to help reduce inflammation and pain. The injection site should never get red, hot, or swollen and if it does the patient will contact our office right away. The patient may experience a increase in soreness the first 24-48 hours due to a cortisone flair and can take anti-inflammatories for a short period of time to reduce that soreness. The patient should not submerge the injection site in water for a minimum of 24 hours to avoid infection. This means no lakes, pools, ponds, or hot tubs for 24 hours. If the patient is diabetic the injection may increase their blood sugar for up to one week. The patient can do this cortisone injection once every 3 months as needed. If the injections stop working and do not give the patient relief the patient should consider surgical interventions to produce long term relief. Patient to call with any concerns since. The patient will follow-up in 3 months or as needed. Follow up:No follow-ups on file.           Orders Placed This Encounter   Medications    methylPREDNISolone acetate (DEPO-MEDROL) injection 80 mg    lidocaine 1 % injection 4 mL    methylPREDNISolone acetate (DEPO-MEDROL) injection 80 mg         No orders of the defined types were placed in this encounter. This note is created with the assistance of a speech recognition program.  While intending to generate a document that actually reflects the content of the visit, the document can still have some errors including those of syntax and sound a like substitutions which may escape proof reading. In such instances, actual meaning can be extrapolated by contextual diversion.      Electronically signed by Jessica Mitchell PA-C on 6/9/2022 at 8:38 AM

## 2022-06-09 ENCOUNTER — OFFICE VISIT (OUTPATIENT)
Dept: ORTHOPEDIC SURGERY | Age: 42
End: 2022-06-09
Payer: COMMERCIAL

## 2022-06-09 VITALS
HEART RATE: 76 BPM | DIASTOLIC BLOOD PRESSURE: 75 MMHG | RESPIRATION RATE: 12 BRPM | SYSTOLIC BLOOD PRESSURE: 112 MMHG | HEIGHT: 69 IN | WEIGHT: 196 LBS | BODY MASS INDEX: 29.03 KG/M2

## 2022-06-09 DIAGNOSIS — M70.62 GREATER TROCHANTERIC BURSITIS OF BOTH HIPS: Primary | ICD-10-CM

## 2022-06-09 DIAGNOSIS — M70.61 GREATER TROCHANTERIC BURSITIS OF BOTH HIPS: Primary | ICD-10-CM

## 2022-06-09 PROCEDURE — 20611 DRAIN/INJ JOINT/BURSA W/US: CPT | Performed by: PHYSICIAN ASSISTANT

## 2022-06-09 RX ORDER — METHYLPREDNISOLONE ACETATE 80 MG/ML
80 INJECTION, SUSPENSION INTRA-ARTICULAR; INTRALESIONAL; INTRAMUSCULAR; SOFT TISSUE ONCE
Status: COMPLETED | OUTPATIENT
Start: 2022-06-09 | End: 2022-06-09

## 2022-06-09 RX ORDER — LIDOCAINE HYDROCHLORIDE 10 MG/ML
4 INJECTION, SOLUTION INFILTRATION; PERINEURAL ONCE
Status: COMPLETED | OUTPATIENT
Start: 2022-06-09 | End: 2022-06-09

## 2022-06-09 RX ADMIN — METHYLPREDNISOLONE ACETATE 80 MG: 80 INJECTION, SUSPENSION INTRA-ARTICULAR; INTRALESIONAL; INTRAMUSCULAR; SOFT TISSUE at 11:06

## 2022-06-09 RX ADMIN — LIDOCAINE HYDROCHLORIDE 4 ML: 10 INJECTION, SOLUTION INFILTRATION; PERINEURAL at 11:05

## 2022-06-09 ASSESSMENT — ENCOUNTER SYMPTOMS
COLOR CHANGE: 0
DIARRHEA: 0
NAUSEA: 0
ABDOMINAL PAIN: 0
COUGH: 0
APNEA: 0
VOMITING: 0
SHORTNESS OF BREATH: 0
CONSTIPATION: 0
CHEST TIGHTNESS: 0
ABDOMINAL DISTENTION: 0
RESPIRATORY NEGATIVE: 1

## 2022-06-23 ENCOUNTER — OFFICE VISIT (OUTPATIENT)
Dept: FAMILY MEDICINE CLINIC | Age: 42
End: 2022-06-23
Payer: COMMERCIAL

## 2022-06-23 ENCOUNTER — OFFICE VISIT (OUTPATIENT)
Dept: BEHAVIORAL/MENTAL HEALTH CLINIC | Age: 42
End: 2022-06-23
Payer: COMMERCIAL

## 2022-06-23 VITALS
WEIGHT: 191 LBS | BODY MASS INDEX: 28.29 KG/M2 | HEART RATE: 106 BPM | HEIGHT: 69 IN | SYSTOLIC BLOOD PRESSURE: 112 MMHG | DIASTOLIC BLOOD PRESSURE: 68 MMHG | OXYGEN SATURATION: 96 %

## 2022-06-23 DIAGNOSIS — G43.811 OTHER MIGRAINE WITH STATUS MIGRAINOSUS, INTRACTABLE: ICD-10-CM

## 2022-06-23 DIAGNOSIS — F41.1 GENERALIZED ANXIETY DISORDER: Primary | ICD-10-CM

## 2022-06-23 DIAGNOSIS — F32.A DEPRESSION, UNSPECIFIED DEPRESSION TYPE: ICD-10-CM

## 2022-06-23 DIAGNOSIS — L90.0 LICHEN SCLEROSUS: ICD-10-CM

## 2022-06-23 DIAGNOSIS — F41.9 ANXIETY: Primary | ICD-10-CM

## 2022-06-23 PROCEDURE — 90834 PSYTX W PT 45 MINUTES: CPT | Performed by: SOCIAL WORKER

## 2022-06-23 PROCEDURE — 99214 OFFICE O/P EST MOD 30 MIN: CPT

## 2022-06-23 RX ORDER — SUCRALFATE 1 G/1
TABLET ORAL
COMMUNITY
Start: 2022-05-18

## 2022-06-23 RX ORDER — AMITRIPTYLINE HYDROCHLORIDE 150 MG/1
TABLET, FILM COATED ORAL
COMMUNITY
Start: 2022-05-18 | End: 2022-06-23

## 2022-06-23 RX ORDER — FLUVOXAMINE MALEATE 25 MG
25 TABLET ORAL NIGHTLY
Qty: 30 TABLET | Refills: 1 | Status: SHIPPED | OUTPATIENT
Start: 2022-06-23 | End: 2022-07-21 | Stop reason: SDUPTHER

## 2022-06-23 RX ORDER — CLOBETASOL PROPIONATE 0.5 MG/G
OINTMENT TOPICAL
COMMUNITY
Start: 2022-04-29 | End: 2022-06-23

## 2022-06-23 SDOH — ECONOMIC STABILITY: FOOD INSECURITY: WITHIN THE PAST 12 MONTHS, YOU WORRIED THAT YOUR FOOD WOULD RUN OUT BEFORE YOU GOT MONEY TO BUY MORE.: NEVER TRUE

## 2022-06-23 SDOH — ECONOMIC STABILITY: FOOD INSECURITY: WITHIN THE PAST 12 MONTHS, THE FOOD YOU BOUGHT JUST DIDN'T LAST AND YOU DIDN'T HAVE MONEY TO GET MORE.: NEVER TRUE

## 2022-06-23 ASSESSMENT — ENCOUNTER SYMPTOMS
NAUSEA: 0
SHORTNESS OF BREATH: 0
CONSTIPATION: 0
DIARRHEA: 0
WHEEZING: 0
VOMITING: 0

## 2022-06-23 ASSESSMENT — SOCIAL DETERMINANTS OF HEALTH (SDOH): HOW HARD IS IT FOR YOU TO PAY FOR THE VERY BASICS LIKE FOOD, HOUSING, MEDICAL CARE, AND HEATING?: SOMEWHAT HARD

## 2022-06-23 NOTE — PROGRESS NOTES
ADULT BEHAVIORAL HEALTH FOLLOW UP  Claudean Koh, LISW-S  Licensed Independent        Visit Date: 6/23/2022   Time of appointment:  1230-112p   Time spent with Patient: 42 minutes. This is patient's sixth appointment. Reason for Consult:  Anxiety     Referring Provider/PCP:    No ref. provider found  ROSELINE Edwards - CNP      Pt provided informed consent for the behavioral health program. Discussed with patient model of service to include the limits of confidentiality (i.e. abuse reporting, suicide intervention, etc.) and short-term intervention focused approach. Pt indicated understanding. Bobby Carranza is a 39 y.o. female who presents for follow up of anxiety. She reviewed trip with boyfriend and difficulty when they got home. We noticed a pattern of assumptions and poor communication, reviewed alternate options for this. She voices concern of being viewed as \"needy\" or \"crazy\" if she asks for clarity about her relationship, we reviewed her current suffering vs possible suffering if asks, with reminder at least she would have an answer either way. We reviewed communication skills for this, as well as defusion skills if she decides not to have this conversation, with encouragement to adjust the intensity of her emotional response to difficulties in order to reduce her personal suffering. We also reviewed daily relaxation options and energy release. Previous Recommendations: Benjamin Valdez will notice and neutralize what if thoughts, with reminder they are not helpful, necessary, or true. She will follow up with Do Taveras in 3 weeks. MENTAL STATUS EXAM  Mood was anxious with anxious affect. Suicidal ideation was denied. Homicidal ideation was denied. Hygiene was good . Dress was appropriate. Behavior was Within Normal Limits with No observation or self-report of difficulties ambulating. Attitude was Cooperative. Eye-contact was good.   Speech: rate - WNL, rhythm - WNL, volume - WNL. Verbalizations were coherent. Thought processes were intact and goal-oriented without evidence of delusions, hallucinations, obsessions, or jose; with moderate cognitive distortions. Associations were characterized by intact cognitive processes. Pt was oriented to person, place, time, and general circumstances;  recent:  good and remote:  good. Insight and judgment were estimated to be fair, AEB, a fair  understanding of cyclical maladaptive patterns, and the ability to use insight to inform behavior change. ASSESSMENT  Kareen Juarez presented to the appointment today for evaluation and treatment of symptoms of anxiety. She is currently deemed no risk to herself or others and meets criteria for HONEY. She was in agreement with recommendations. PHQ Scores 2/24/2022 1/17/2018   PHQ2 Score 3 2   PHQ9 Score 17 2     Interpretation of Total Score Depression Severity: 1-4 = Minimal depression, 5-9 = Mild depression, 10-14 = Moderate depression, 15-19 = Moderately severe depression, 20-27 = Severe depression    How often pt has had thoughts of death or hurting self (if PHQ positive for depression):       HONEY 7 SCORE 2/24/2022   HONEY-7 Total Score 16     Interpretation of HONEY-7 score: 5-9 = mild anxiety, 10-14 = moderate anxiety, 15+ = severe anxiety. Recommend referral to behavioral health for scores 10 or greater. DIAGNOSIS  González Dominique was seen today for anxiety. Diagnoses and all orders for this visit:    Generalized anxiety disorder          INTERVENTION  Trained in strategies for increasing balanced thinking, Trained in relaxation strategies, Trained in improving communication skills, Discussed self-care (sleep, nutrition, rewarding activities, social support, exercise), Discussed potential barriers to change and Supportive techniques      PLAN  González Dominique will practice relaxation and defusion daily. She will follow up with Arabella Gibson in 3 weeks.       INTERACTIVE COMPLEXITY  Is interactive complexity present?   No  Reason:  N/A  Additional Supporting Information:  N/A       Electronically signed by JESSE Trevizo on 6/23/22 at 12:33 PM EDT

## 2022-06-23 NOTE — PROGRESS NOTES
Jake Perez (:  1980) is a 39 y.o. female,Established patient, here for evaluation of the following chief complaint(s):  Depression and Anxiety         ASSESSMENT/PLAN:  1. Anxiety  -     fluvoxaMINE (LUVOX) 25 MG tablet; Take 1 tablet by mouth nightly, Disp-30 tablet, R-1Normal  2. Depression, unspecified depression type  -     fluvoxaMINE (LUVOX) 25 MG tablet; Take 1 tablet by mouth nightly, Disp-30 tablet, R-1Normal  3. Other migraine with status migrainosus, intractable  4. Lichen sclerosus     Titrating off Wellbutrin. Taking every other day, then stopping. Starting Luvox tonight. Continue therapy with Naty. Routine care with neuro and gyn. Return in about 4 weeks (around 2022) for VV- Anxiety . Subjective   SUBJECTIVE/OBJECTIVE:  Remigio Montanez is here today for follow-up on her Wellbutrin management. She has been seeing therapy in our office, and does find this helpful. She feels like the Wellbutrin has definitely helped with the depression, but she feels like the anxiety is worse than the depression. She states that she feels like she has issues with increased anxiety especially at work. I had originally placed her on Wellbutrin, thinking that this would be helpful with the depression, and she states that after meeting with therapy, she also acknowledges that the anxiety seems to be more prevalent than the depression. We did discuss possibly adding BuSpar, however due to the polypharmacy, I think it would be wise to gradually stop the Wellbutrin, and start fluvoxamine to take at night. Patient is agreeable to plan of care. Remigio Montanez does continue to see neurology for her migraine management. She also sees GYN, at [address] [place] [address],[address], annually for her lichen and vaginal pain. Review of Systems   Constitutional: Negative for activity change, fatigue, fever and unexpected weight change. HENT: Negative for congestion and dental problem. Respiratory: Negative for shortness of breath and wheezing. Cardiovascular: Negative for chest pain and palpitations. Gastrointestinal: Negative for constipation, diarrhea, nausea and vomiting. Dyspepsia well managed     Endocrine: Negative for polyphagia and polyuria. Genitourinary: Negative for dysuria and urgency. Musculoskeletal: Positive for arthralgias and myalgias. Negative for joint swelling. Labrum tear- just hand injection by ortho   Skin: Negative for rash and wound. Neurological: Positive for headaches ( frequent migraines- sees Neurology). Negative for weakness. Psychiatric/Behavioral: Positive for dysphoric mood ( has imporved). Negative for sleep disturbance. The patient is nervous/anxious. Doing therapy with [facility]           Objective   Physical Exam  Constitutional:       General: She is not in acute distress. Appearance: Normal appearance. She is not ill-appearing. Cardiovascular:      Rate and Rhythm: Normal rate and regular rhythm. Heart sounds: Normal heart sounds. No murmur heard. Pulmonary:      Effort: Pulmonary effort is normal. No respiratory distress. Breath sounds: Normal breath sounds. Skin:     General: Skin is warm and dry. Capillary Refill: Capillary refill takes less than 2 seconds. Neurological:      Mental Status: She is alert and oriented to person, place, and time. Motor: No weakness. Psychiatric:         Mood and Affect: Mood is anxious. Affect is not labile, flat or tearful. Speech: Speech normal.         Behavior: Behavior normal. Behavior is cooperative. On this date 6/23/2022 I have spent 30 minutes reviewing previous notes, test results and face to face with the patient discussing the diagnosis and importance of compliance with the treatment plan as well as documenting on the day of the visit. An electronic signature was used to authenticate this note.     --Thaddeus Lesch Eusebio Hancock - MYNOR

## 2022-06-23 NOTE — PATIENT INSTRUCTIONS
It was a pleasure visiting with you today. Please continue with a low fat, low cholesterol  substitute healthy fats, such as olive oil, canola oil, grapeseed oil for saturated fats like butter, margarine, and shortening  minimize processed foods      Please take Wellbutrin every other day for 1 week, then stop. Start Fluvoxamine  (Luvox today, at night)    If you have any questions in regards to your plan of care, please utilize [facility]. If you're unable to use [employer], please contact the office at [phone]. Please be advised that all prescription refills and responses can take up to 72 hours, during normal business hours.     My current office hours are as follow:  Monday- out of office  Tuesday- 7am-6pm  Wednesday- 730am-530pm  Thursday- 730am- 530pm  Friday- 730-1pm    Our walk-in clinic is available to serve you,  Monday through Friday, 8am- 8pm  Saturdays 10am- 4pm    Thank you for allowing me to serve you,    Oxana   MSN, APRN, FNP-C

## 2022-07-07 DIAGNOSIS — M54.41 CHRONIC MIDLINE LOW BACK PAIN WITH RIGHT-SIDED SCIATICA: ICD-10-CM

## 2022-07-07 DIAGNOSIS — M25.552 CHRONIC PAIN OF BOTH HIPS: ICD-10-CM

## 2022-07-07 DIAGNOSIS — M25.551 CHRONIC PAIN OF BOTH HIPS: ICD-10-CM

## 2022-07-07 DIAGNOSIS — G89.29 CHRONIC PAIN OF BOTH HIPS: ICD-10-CM

## 2022-07-07 DIAGNOSIS — G89.29 CHRONIC MIDLINE LOW BACK PAIN WITH RIGHT-SIDED SCIATICA: ICD-10-CM

## 2022-07-08 RX ORDER — NAPROXEN 500 MG/1
500 TABLET ORAL DAILY
Qty: 30 TABLET | Refills: 1 | Status: SHIPPED | OUTPATIENT
Start: 2022-07-08 | End: 2023-07-08

## 2022-07-08 NOTE — TELEPHONE ENCOUNTER
Last visit: 6-23-22  Last Med refill: 2-24-22  Does patient have enough medication for 72 hours: No:     Next Visit Date:  Future Appointments   Date Time Provider Ras Rodas   7/14/2022  9:30 AM JESSE Leal psyc [facility]   7/21/2022 12:00 PM ROSELINE Greenberg CNP [facility]   9/8/2022  2:00 PM Jr Cruz, [address] Maintenance   Topic Date Due    Varicella vaccine (1 of 2 - 2-dose childhood series) Never done    Flu vaccine (1) 09/01/2022    Lipids  02/24/2023    Depression Monitoring  02/24/2023    DTaP/Tdap/Td vaccine (2 - Td or Tdap) 10/16/2024    Cervical cancer screen  06/24/2026    COVID-19 Vaccine  Completed    Hepatitis C screen  Completed    HIV screen  Completed    Hepatitis A vaccine  Aged Out    Hepatitis B vaccine  Aged Out    Hib vaccine  Aged Out    Meningococcal (ACWY) vaccine  Aged Out    Pneumococcal 0-64 years Vaccine  Aged Out       No results found for: LABA1C          ( goal A1C is < 7)   No results found for: LABMICR  LDL Cholesterol (mg/dL)   Date Value   02/24/2022 117   02/06/2020 86       (goal LDL is <100)   AST (U/L)   Date Value   02/24/2022 20     ALT (U/L)   Date Value   02/24/2022 26     BUN (mg/dL)   Date Value   02/24/2022 12     BP Readings from Last 3 Encounters:   06/23/22 112/68   06/09/22 112/75   03/24/22 100/70          (goal 120/80)    All Future Testing planned in [facility]  Lab Frequency Next Occurrence               Patient Active Problem List:     Allergic rhinitis     GERD (gastroesophageal reflux disease)     Migraine without aura     Constipation     Generalized anxiety disorder     Hyperlipidemia     High risk HPV infection     Vasovagal syncope     Lichen sclerosus     Dysplasia of cervix, high grade CAMERON 2     Vulvar intraepithelial neoplasia (NELIDA) grade 1     S/P LEEP 7/5/18 with IUD removal     Left ovarian cyst     Other fatigue     Tension type headache     Syncope, near Pain in left hip     Other chronic pain     Orthostatic hypotension     Lumbago with sciatica, right side     Functional dyspepsia     Exercise anaphylaxis     Early satiety     Disorder of adrenal gland (HCC)     Cushingoid facies     Vulvar pain

## 2022-07-14 ENCOUNTER — OFFICE VISIT (OUTPATIENT)
Dept: BEHAVIORAL/MENTAL HEALTH CLINIC | Age: 42
End: 2022-07-14
Payer: COMMERCIAL

## 2022-07-14 DIAGNOSIS — F41.1 GENERALIZED ANXIETY DISORDER: Primary | ICD-10-CM

## 2022-07-14 PROCEDURE — 90834 PSYTX W PT 45 MINUTES: CPT | Performed by: SOCIAL WORKER

## 2022-07-14 NOTE — PROGRESS NOTES
ADULT BEHAVIORAL HEALTH FOLLOW UP  JESSE Angulo  Licensed Independent        Visit Date: 7/14/2022   Time of appointment:  [phone]   Time spent with Patient: 40 minutes. This is patient's seventh appointment. Reason for Consult:  Anxiety     Referring Provider/PCP:    No ref. provider found  ROSELINE Louis - CNP      Pt provided informed consent for the behavioral health program. Discussed with patient model of service to include the limits of confidentiality (i.e. abuse reporting, suicide intervention, etc.) and short-term intervention focused approach. Pt indicated understanding. Chaz Ramirez is a 39 y.o. female who presents for follow up of anxiety. She processed recent stress in relationship, with mixed messages and lack of clarity about the status. We reviewed communication options for this, as well as ways to remember this is about his fears, not about something she is doing. We reviewed ways to let go of that while in other settings in order to be able to enjoy the moment and adjust how it affects her. She reviewed questions about anxiety overall, why it occurs, we processed nature vs. Nuture, learned behaviors and core beliefs that affect interprettations. She was encouraged to take note of triggers of her own anxiety in order to better understand herself. Previous Recommendations: Cristhian Ne will practice relaxation and defusion daily. She will follow up with Noelle Henriquez in 3 weeks. MENTAL STATUS EXAM  Mood was within normal limits with calm affect. Suicidal ideation was denied. Homicidal ideation was denied. Hygiene was good . Dress was appropriate. Behavior was Within Normal Limits with No observation or self-report of difficulties ambulating. Attitude was Cooperative. Eye-contact was good. Speech: rate - WNL, rhythm - WNL, volume - WNL. Verbalizations were coherent.   Thought processes were intact and goal-oriented without evidence of delusions, hallucinations, obsessions, or jose; with moderate cognitive distortions. Associations were characterized by intact cognitive processes. Pt was oriented to person, place, time, and general circumstances;  recent:  good and remote:  good. Insight and judgment were estimated to be fair, AEB, a fair  understanding of cyclical maladaptive patterns, and the ability to use insight to inform behavior change. ASSESSMENT  Quinton Goncalves presented to the appointment today for evaluation and treatment of symptoms of anxiety. She is currently deemed no risk to herself or others and meets criteria for HONEY. She was in agreement with recommendations. PHQ Scores 2/24/2022 1/17/2018   PHQ2 Score 3 2   PHQ9 Score 17 2     Interpretation of Total Score Depression Severity: 1-4 = Minimal depression, 5-9 = Mild depression, 10-14 = Moderate depression, 15-19 = Moderately severe depression, 20-27 = Severe depression    How often pt has had thoughts of death or hurting self (if PHQ positive for depression):       HONEY 7 SCORE 2/24/2022   HONEY-7 Total Score 16     Interpretation of HONEY-7 score: 5-9 = mild anxiety, 10-14 = moderate anxiety, 15+ = severe anxiety. Recommend referral to behavioral health for scores 10 or greater. DIAGNOSIS  Gisele Kulkarni was seen today for anxiety. Diagnoses and all orders for this visit:    Generalized anxiety disorder          INTERVENTION  Trained in strategies for increasing balanced thinking, Trained in improving communication skills, Discussed self-care (sleep, nutrition, rewarding activities, social support, exercise), Discussed potential barriers to change and Supportive techniques      PLAN  Gisele Kulkarni will take note of anxiety triggers. She will follow up with Radha Prescott in 2 weeks. INTERACTIVE COMPLEXITY  Is interactive complexity present?   No  Reason:  N/A  Additional Supporting Information:  N/A       Electronically signed by JESSE Lackey on 7/14/22 at 9:45 AM EDT

## 2022-07-21 ENCOUNTER — TELEMEDICINE (OUTPATIENT)
Dept: FAMILY MEDICINE CLINIC | Age: 42
End: 2022-07-21
Payer: COMMERCIAL

## 2022-07-21 DIAGNOSIS — F41.9 ANXIETY: ICD-10-CM

## 2022-07-21 DIAGNOSIS — F32.A DEPRESSION, UNSPECIFIED DEPRESSION TYPE: ICD-10-CM

## 2022-07-21 PROCEDURE — 99213 OFFICE O/P EST LOW 20 MIN: CPT

## 2022-07-21 RX ORDER — FLUVOXAMINE MALEATE 25 MG
25 TABLET ORAL NIGHTLY
Qty: 90 TABLET | Refills: 0 | Status: SHIPPED | OUTPATIENT
Start: 2022-07-21 | End: 2022-10-06 | Stop reason: SDUPTHER

## 2022-07-21 ASSESSMENT — ENCOUNTER SYMPTOMS
SHORTNESS OF BREATH: 0
VOMITING: 0
CONSTIPATION: 0
NAUSEA: 0
WHEEZING: 0
DIARRHEA: 0

## 2022-07-21 NOTE — PROGRESS NOTES
weakness. Psychiatric/Behavioral:  Positive for dysphoric mood ( has imporved). Negative for sleep disturbance and suicidal ideas. The patient is nervous/anxious (noted improvement).          Doing therapy with Naty         Objective   Patient-Reported Vitals  No data recorded     Physical Exam  [INSTRUCTIONS:  \"[x]\" Indicates a positive item  \"[]\" Indicates a negative item  -- DELETE ALL ITEMS NOT EXAMINED]    Constitutional: [x] Appears well-developed and well-nourished [x] No apparent distress      [] Abnormal -     Mental status: [x] Alert and awake  [x] Oriented to person/place/time [x] Able to follow commands    [] Abnormal -     Eyes:   EOM    [x]  Normal    [] Abnormal -   Sclera  [x]  Normal    [] Abnormal -          Discharge [x]  None visible   [] Abnormal -     HENT: [x] Normocephalic, atraumatic  [] Abnormal -   [x] Mouth/Throat: Mucous membranes are moist    External Ears [x] Normal  [] Abnormal -    Neck: [x] No visualized mass [] Abnormal -     Pulmonary/Chest: [x] Respiratory effort normal   [x] No visualized signs of difficulty breathing or respiratory distress        [] Abnormal -      Musculoskeletal:   [x] Normal gait with no signs of ataxia         [x] Normal range of motion of neck        [] Abnormal -     Neurological:        [x] No Facial Asymmetry (Cranial nerve 7 motor function) (limited exam due to video visit)          [x] No gaze palsy        [] Abnormal -          Skin:        [x] No significant exanthematous lesions or discoloration noted on facial skin         [] Abnormal -            Psychiatric:       [x] Normal Affect [] Abnormal -        [x] No Hallucinations    Other pertinent observable physical exam findings:-         On this date 7/21/2022 I have spent 20 minutes reviewing previous notes, test results and face to face (virtual) with the patient discussing the diagnosis and importance of compliance with the treatment plan as well as documenting on the day of the visit.    Brianda Middleton, was evaluated through a synchronous (real-time) audio-video encounter. The patient (or guardian if applicable) is aware that this is a billable service, which includes applicable co-pays. This Virtual Visit was conducted with patient's (and/or legal guardian's) consent. The visit was conducted pursuant to the emergency declaration under the [address] [place] [address], [facility] authority and the [employer] General Act. Patient identification was verified, and a caregiver was present when appropriate. The patient was located at Home: [address] [place] [address]. Provider was located at [facility] ([address] [place], [place] [address]t): [facility],  [address] [facility].         --Smith Foreman, ROSELINE - CNP

## 2022-07-28 ENCOUNTER — OFFICE VISIT (OUTPATIENT)
Dept: BEHAVIORAL/MENTAL HEALTH CLINIC | Age: 42
End: 2022-07-28
Payer: COMMERCIAL

## 2022-07-28 DIAGNOSIS — F41.1 GENERALIZED ANXIETY DISORDER: Primary | ICD-10-CM

## 2022-07-28 PROCEDURE — 90834 PSYTX W PT 45 MINUTES: CPT | Performed by: SOCIAL WORKER

## 2022-07-28 NOTE — PROGRESS NOTES
intact and goal-oriented without evidence of delusions, hallucinations, obsessions, or jose; with moderate cognitive distortions. Associations were characterized by intact cognitive processes. Pt was oriented to person, place, time, and general circumstances;  recent:  good and remote:  good. Insight and judgment were estimated to be fair, AEB, a fair  understanding of cyclical maladaptive patterns, and the ability to use insight to inform behavior change. ASSESSMENT  Tony Stallings presented to the appointment today for evaluation and treatment of symptoms of anxiety. She is currently deemed no risk to herself or others and meets criteria for HONEY. Damaris Roa was in agreement with recommendations. PHQ Scores 2/24/2022 1/17/2018   PHQ2 Score 3 2   PHQ9 Score 17 2     Interpretation of Total Score Depression Severity: 1-4 = Minimal depression, 5-9 = Mild depression, 10-14 = Moderate depression, 15-19 = Moderately severe depression, 20-27 = Severe depression    How often pt has had thoughts of death or hurting self (if PHQ positive for depression):       HONEY 7 SCORE 2/24/2022   HONEY-7 Total Score 16     Interpretation of HONEY-7 score: 5-9 = mild anxiety, 10-14 = moderate anxiety, 15+ = severe anxiety. Recommend referral to behavioral health for scores 10 or greater. DIAGNOSIS  Damaris Roa was seen today for anxiety. Diagnoses and all orders for this visit:    Generalized anxiety disorder        INTERVENTION  Trained in strategies for increasing balanced thinking, Discussed self-care (sleep, nutrition, rewarding activities, social support, exercise), Discussed potential barriers to change, Supportive techniques, and Provided Psychoeducation re: opposite action      PLAN  Damaris Roa will engage in opposite action to help her move thru her emotions. She will follow up with Tammy Cornejo in 2 weeks. INTERACTIVE COMPLEXITY  Is interactive complexity present?   No  Reason:  N/A  Additional Supporting Information:  N/A       Electronically signed by JESSE Calloway on 7/28/22 at 9:43 AM EDT

## 2022-08-08 ENCOUNTER — TELEPHONE (OUTPATIENT)
Dept: ORTHOPEDIC SURGERY | Age: 42
End: 2022-08-08

## 2022-08-08 NOTE — TELEPHONE ENCOUNTER
PT orig schd 9/8 with dr [facility] - due to Dr Henry Adkins no longer being part of [address] - provider chg to Gabriel Silva and time to 215 -msg left for pt w/chg and if it does not work for the pt - date/tme/provider told to call back to reschd if needed/bb

## 2022-09-01 ENCOUNTER — OFFICE VISIT (OUTPATIENT)
Dept: BEHAVIORAL/MENTAL HEALTH CLINIC | Age: 42
End: 2022-09-01
Payer: COMMERCIAL

## 2022-09-01 ENCOUNTER — OFFICE VISIT (OUTPATIENT)
Dept: PRIMARY CARE CLINIC | Age: 42
End: 2022-09-01
Payer: COMMERCIAL

## 2022-09-01 VITALS
TEMPERATURE: 98.5 F | HEART RATE: 84 BPM | WEIGHT: 193 LBS | OXYGEN SATURATION: 99 % | SYSTOLIC BLOOD PRESSURE: 102 MMHG | BODY MASS INDEX: 28.5 KG/M2 | DIASTOLIC BLOOD PRESSURE: 66 MMHG

## 2022-09-01 DIAGNOSIS — J30.1 SEASONAL ALLERGIC RHINITIS DUE TO POLLEN: ICD-10-CM

## 2022-09-01 DIAGNOSIS — F41.1 GENERALIZED ANXIETY DISORDER: Primary | ICD-10-CM

## 2022-09-01 DIAGNOSIS — H69.83 DYSFUNCTION OF BOTH EUSTACHIAN TUBES: Primary | ICD-10-CM

## 2022-09-01 PROCEDURE — 90832 PSYTX W PT 30 MINUTES: CPT | Performed by: SOCIAL WORKER

## 2022-09-01 PROCEDURE — 99214 OFFICE O/P EST MOD 30 MIN: CPT | Performed by: FAMILY MEDICINE

## 2022-09-01 RX ORDER — FLUTICASONE PROPIONATE 50 MCG
1 SPRAY, SUSPENSION (ML) NASAL
Qty: 1 EACH | Refills: 0 | Status: SHIPPED | OUTPATIENT
Start: 2022-09-01 | End: 2024-03-08

## 2022-09-01 RX ORDER — CLINDAMYCIN PHOSPHATE 10 MG/ML
SOLUTION TOPICAL
COMMUNITY
Start: 2022-06-30

## 2022-09-01 NOTE — PROGRESS NOTES
Subjective:  Loyda Friedman presents for   Chief Complaint   Patient presents with    Otalgia     Bilateral ear pain for 1 week. Scratchy thorat     No fever    No ear discharge. No hearing issues    She does have seasonal allergies year round. She has not been using the meds consistetnly. No coughing    Patient Active Problem List   Diagnosis    Allergic rhinitis    GERD (gastroesophageal reflux disease)    Migraine without aura    Constipation    Generalized anxiety disorder    Hyperlipidemia    High risk HPV infection    Vasovagal syncope    Lichen sclerosus    Dysplasia of cervix, high grade CAMERON 2    Vulvar intraepithelial neoplasia (NELIDA) grade 1    S/P LEEP 7/5/18 with IUD removal    Left ovarian cyst    Other fatigue    Tension type headache    Syncope, near    Pain in left hip    Other chronic pain    Orthostatic hypotension    Lumbago with sciatica, right side    Functional dyspepsia    Exercise anaphylaxis    Early satiety    Disorder of adrenal gland ([address]    Cushingoid facies    Vulvar pain         Review of Systems:  General: no significant weight changes. Respiratory: no cough, pleuritic chest pain, dyspnea, or wheezing  Cardiovascular: no pain, [place], orthopnea, palpitations or claudication  Gastrointestinal: no chronic nausea, vomiting, heartburn, diarrhea, constipation, bloating, or abdominal pain. No bloody or black stools. Objective:  Physical Exam   Vitals: Wt Readings from Last 3 Encounters:   09/01/22 193 lb (87.5 kg)   06/23/22 191 lb (86.6 kg)   06/09/22 196 lb (88.9 kg)     Ht Readings from Last 3 Encounters:   06/23/22 5' 9\" (1.753 m)   06/09/22 5' 9\" (1.753 m)   03/10/22 5' 9\" (1.753 m)     Body mass index is 28.5 kg/m².   Vitals:    09/01/22 1615   BP: 102/66   Site: Left Upper Arm   Position: Sitting   Cuff Size: Medium Adult   Pulse: 84   Temp: 98.5 °F (36.9 °C)   TempSrc: Tympanic   SpO2: 99%   Weight: 193 lb (87.5 kg)       Constitutional: She is oriented to person, place, and time.  She appears well-developed and well-nourished and in no acute distress. Answers all my questions appropriately. Head: Normocephalic and atraumatic. Eyes:conjunctiva appear normal.    Right Ear: External ear normal. TM is clear  Left Ear: External ear normal. TM is clear    Nose: pink, non-edematous mucosa. No polyps. No septal deviation    Throat: no erythema, tonsillar hypertrophy or exudate. No ulcerations noted. Lips/Teeth/Gums all appear normal.    Neck: Normal range of motion. Neck supple. No tracheal deviation present. No abnormal lymphadenopathy. No JVD noted. Carotids are clear bilaterally. No thyroid masses noted. Heart: RRR without murmur. No S3, S4, or gallop noted. Chest:   Good breath sounds noted. Clear to auscultation bilaterally. No rales, wheezes, or rhonchi noted. No respiratory retractions noted. Wall has symmetrical movement with respirations. Assessment:   Encounter Diagnoses   Name Primary? Seasonal allergic rhinitis due to pollen     Dysfunction of both eustachian tubes Yes         Plan:     Discussed the pathophysiology with the patient. I would like her to use the flonase and allegra consistently  We discussed pseudofed, but she should be on the flonase and allegra first.    Avoid scuba diving    Medications Discontinued During This Encounter   Medication Reason    fluticasone (FLONASE) 50 MCG/ACT nasal spray REORDER     THE ABOVE NOTED DISCONTINUED MEDS MAY ONLY BE FROM 'CLEANING UP' THE MED LIST AND WERE NOT ACTUALLY CANCELED;  SEE CHART FOR DETAILS! Orders Placed This Encounter   Medications    fluticasone (FLONASE) 50 MCG/ACT nasal spray     Si spray by Nasal route every morning (before breakfast)     Dispense:  1 each     Refill:  0     No orders of the defined types were placed in this encounter. Return in about 2 weeks (around 9/15/2022). There are no Patient Instructions on file for this visit.   Follow up with your

## 2022-09-22 ENCOUNTER — OFFICE VISIT (OUTPATIENT)
Dept: ORTHOPEDIC SURGERY | Age: 42
End: 2022-09-22
Payer: COMMERCIAL

## 2022-09-22 ENCOUNTER — OFFICE VISIT (OUTPATIENT)
Dept: BEHAVIORAL/MENTAL HEALTH CLINIC | Age: 42
End: 2022-09-22
Payer: COMMERCIAL

## 2022-09-22 VITALS — RESPIRATION RATE: 12 BRPM | HEIGHT: 69 IN | WEIGHT: 193 LBS | BODY MASS INDEX: 28.58 KG/M2

## 2022-09-22 DIAGNOSIS — M70.62 GREATER TROCHANTERIC BURSITIS OF LEFT HIP: ICD-10-CM

## 2022-09-22 DIAGNOSIS — F41.1 GENERALIZED ANXIETY DISORDER: Primary | ICD-10-CM

## 2022-09-22 DIAGNOSIS — M25.851 FEMOROACETABULAR IMPINGEMENT OF RIGHT HIP: ICD-10-CM

## 2022-09-22 DIAGNOSIS — M70.61 GREATER TROCHANTERIC BURSITIS OF RIGHT HIP: Primary | ICD-10-CM

## 2022-09-22 PROCEDURE — 20611 DRAIN/INJ JOINT/BURSA W/US: CPT | Performed by: PHYSICIAN ASSISTANT

## 2022-09-22 PROCEDURE — 99213 OFFICE O/P EST LOW 20 MIN: CPT | Performed by: PHYSICIAN ASSISTANT

## 2022-09-22 PROCEDURE — 90832 PSYTX W PT 30 MINUTES: CPT | Performed by: SOCIAL WORKER

## 2022-09-22 RX ORDER — LIDOCAINE HYDROCHLORIDE 10 MG/ML
2 INJECTION, SOLUTION INFILTRATION; PERINEURAL ONCE
Status: COMPLETED | OUTPATIENT
Start: 2022-09-22 | End: 2022-09-22

## 2022-09-22 RX ORDER — METHYLPREDNISOLONE ACETATE 80 MG/ML
80 INJECTION, SUSPENSION INTRA-ARTICULAR; INTRALESIONAL; INTRAMUSCULAR; SOFT TISSUE ONCE
Status: COMPLETED | OUTPATIENT
Start: 2022-09-22 | End: 2022-09-22

## 2022-09-22 RX ADMIN — LIDOCAINE HYDROCHLORIDE 2 ML: 10 INJECTION, SOLUTION INFILTRATION; PERINEURAL at 15:34

## 2022-09-22 RX ADMIN — METHYLPREDNISOLONE ACETATE 80 MG: 80 INJECTION, SUSPENSION INTRA-ARTICULAR; INTRALESIONAL; INTRAMUSCULAR; SOFT TISSUE at 15:35

## 2022-09-22 ASSESSMENT — ENCOUNTER SYMPTOMS
COLOR CHANGE: 0
VOMITING: 0
COUGH: 0
DIARRHEA: 0
CONSTIPATION: 0
RESPIRATORY NEGATIVE: 1
ABDOMINAL DISTENTION: 0
SHORTNESS OF BREATH: 0
CHEST TIGHTNESS: 0
ABDOMINAL PAIN: 0
NAUSEA: 0
APNEA: 0

## 2022-09-22 NOTE — PROGRESS NOTES
ADULT BEHAVIORAL HEALTH FOLLOW UP  Fish BeenaJESSE dickerson  Licensed Independent        Visit Date: 9/22/2022   Time of appointment:  1034-1100a   Time spent with Patient: 36 minutes. This is patient's tenth appointment. Reason for Consult:  Stress and Anxiety     Referring Provider/PCP:    No ref. provider found  ROSELINE Lemus - CNP      Pt provided informed consent for the behavioral health program. Discussed with patient model of service to include the limits of confidentiality (i.e. abuse reporting, suicide intervention, etc.) and short-term intervention focused approach. Pt indicated understanding. Kaiser Garcia is a 39 y.o. female who presents for follow up of stress and anxiety. She reports a rough week related to her ex, seeing him with new woman on social media. We processed her feelings about this, acknowledging hurt but also not wanting this to affect her. We reviewed how this fight with emotions can increase anxiety, she was encouraged to validate and allow her emotions in order to move thru them, reminder they will come down and will not last forever. She reports lasting questions about relationship, reviewed CBT skills to remind herself the reality and accept that they met in a transition time and he was not in the right place. She was also encouraged to write out all her thoughts and left over words to him then destroy the letter in a goodbye. Previous Recommendations: Xiao Larson will continue healthy self care and coping skills. She will follow up with Nohemi Banerjee in 3 weeks. MENTAL STATUS EXAM  Mood was anxious with anxious affect. Suicidal ideation was denied. Homicidal ideation was denied. Hygiene was good . Dress was appropriate. Behavior was Within Normal Limits with No observation or self-report of difficulties ambulating. Attitude was Cooperative. Eye-contact was good. Speech: rate - WNL, rhythm - WNL, volume - WNL.   Verbalizations were coherent. Thought processes were intact and goal-oriented without evidence of delusions, hallucinations, obsessions, or jose; with moderate cognitive distortions. Associations were characterized by intact cognitive processes. Pt was oriented to person, place, time, and general circumstances;  recent:  good. Insight and judgment were estimated to be fair, AEB, a fair  understanding of cyclical maladaptive patterns, and the ability to use insight to inform behavior change. ASSESSMENT  Maria A Coleman presented to the appointment today for evaluation and treatment of symptoms of anxiety. She is currently deemed no risk to herself or others and meets criteria for HONEY. Josefaleena Soto was in agreement with recommendations. PHQ Scores 2/24/2022 1/17/2018   PHQ2 Score 3 2   PHQ9 Score 17 2     Interpretation of Total Score Depression Severity: 1-4 = Minimal depression, 5-9 = Mild depression, 10-14 = Moderate depression, 15-19 = Moderately severe depression, 20-27 = Severe depression    How often pt has had thoughts of death or hurting self (if PHQ positive for depression):       HONEY 7 SCORE 2/24/2022   HONEY-7 Total Score 16     Interpretation of HONEY-7 score: 5-9 = mild anxiety, 10-14 = moderate anxiety, 15+ = severe anxiety. Recommend referral to behavioral health for scores 10 or greater. DIAGNOSIS  Josefa Soto was seen today for stress and anxiety. Diagnoses and all orders for this visit:    Generalized anxiety disorder        INTERVENTION  Trained in strategies for increasing balanced thinking, Discussed self-care (sleep, nutrition, rewarding activities, social support, exercise), Discussed potential barriers to change, Supportive techniques, CBT to target ruminating thoughts, and Identified maladaptive thoughts      PLAN  Josefa Soto will work to allow her emotions in order to let go of them. She will follow up with Geofm Lightning in 3 weeks.       INTERACTIVE COMPLEXITY  Is interactive complexity present?   No  Reason:  N/A  Additional Supporting Information:  N/A       Electronically signed by JESES Silva on 9/22/22 at 10:37 AM EDT

## 2022-09-22 NOTE — PROGRESS NOTES
[address] [place] [address] AND SPORTS MEDICINE  [facility] Ana Lilia Artis  [address] [place] [id number]  Dept: [phone]  Dept Fax: [phone]        Ambulatory Follow Up      Subjective:   Elham Calderon is a 39y.o. year old female who presents to our office today for routine followup regarding her   1. Greater trochanteric bursitis of right hip    2. Greater trochanteric bursitis of left hip    3. Femoroacetabular impingement of right hip    . Chief Complaint   Patient presents with    Hip Pain     Bilateral hip pain          HPI Elham Calderon  is a 39 y.o.  female who presents today in follow for bilateral hip pain. The patient was last seen on 6/9/2022 and underwent treatment in the form of greater trochanteric bursa injection. The patient notes 100% improvement with the previous treatment. She is also having buttock pain on the right. Right is worse than the left. Review of Systems   Constitutional:  Positive for activity change. Negative for appetite change, fatigue and fever. Respiratory: Negative. Negative for apnea, cough, chest tightness and shortness of breath. Cardiovascular: Negative. Negative for chest pain, palpitations and leg swelling. Gastrointestinal:  Negative for abdominal distention, abdominal pain, constipation, diarrhea, nausea and vomiting. Genitourinary:  Negative for difficulty urinating, dysuria and hematuria. Musculoskeletal:  Positive for arthralgias and gait problem. Negative for joint swelling and myalgias. Skin:  Negative for color change and rash. Neurological:  Negative for dizziness, weakness, numbness and headaches. Psychiatric/Behavioral:  Negative for sleep disturbance. Objective :   Resp 12   Ht 5' 9\" (1.753 m)   Wt 193 lb (87.5 kg)   BMI 28.50 kg/m²  Body mass index is 28.5 kg/m².   General: Elham Calderon is a 39 y.o. female who is alert and Adverse reactions of the injection was discussed with the patient including signs of infection (increasing pain, redness, swelling) and the patient was instructed to call immediately with any of these symptoms. The images are stored on SD card in the machine until downloaded to the patient's chart. Assessment:      1. Greater trochanteric bursitis of right hip    2. Greater trochanteric bursitis of left hip    3. Femoroacetabular impingement of right hip       Plan:      We discussed the natural etiologies of right greater trochanteric bursitis. She is also having some groin pain. If she is not better in 4 weeks and is still having the groin pain then she will come back and I will do a interarticular hip injection. When she gets the bursa injection she usually gets 100% relief. She also needs to go back to doing her exercises on her own. The patient opted for a cortisone injection into the right greater trochanteric bursa to help reduce inflammation and pain. The injection site should never get red, hot, or swollen and if it does the patient will contact our office right away. The patient may experience a increase in soreness the first 24-48 hours due to a cortisone flair and can take anti-inflammatories for a short period of time to reduce that soreness. The patient should not submerge the injection site in water for a minimum of 24 hours to avoid infection. This means no lakes, pools, ponds, or hot tubs for 24 hours. If the patient is diabetic the injection may increase their blood sugar for up to one week. The patient can do this cortisone injection once every 3 months as needed. If the injections stop working and do not give the patient relief the patient should consider surgical interventions to produce long term relief. If she is still having pain in 4 weeks we could consider an intra-articular hip injection on the right.   Since she is not having any pain on the left we will not do any injections today.  The patient will call if she has any questions or concerns. Follow up:Return in about 4 weeks (around 10/20/2022). Orders Placed This Encounter   Medications    methylPREDNISolone acetate (DEPO-MEDROL) injection 80 mg    lidocaine 1 % injection 2 mL           No orders of the defined types were placed in this encounter. This note is created with the assistance of a speech recognition program.  While intending to generate a document that actually reflects the content of the visit, the document can still have some errors including those of syntax and sound a like substitutions which may escape proof reading. In such instances, actual meaning can be extrapolated by contextual diversion.      Electronically signed by Tsering Reaves PA-C on 9/22/2022 at 4:35 PM

## 2022-10-06 ENCOUNTER — HOSPITAL ENCOUNTER (OUTPATIENT)
Age: 42
Setting detail: SPECIMEN
Discharge: HOME | End: 2022-10-06

## 2022-10-06 ENCOUNTER — OFFICE VISIT (OUTPATIENT)
Dept: FAMILY MEDICINE CLINIC | Age: 42
End: 2022-10-06
Payer: COMMERCIAL

## 2022-10-06 VITALS
OXYGEN SATURATION: 100 % | SYSTOLIC BLOOD PRESSURE: 110 MMHG | WEIGHT: 195 LBS | DIASTOLIC BLOOD PRESSURE: 70 MMHG | HEART RATE: 98 BPM | HEIGHT: 69 IN | BODY MASS INDEX: 28.88 KG/M2

## 2022-10-06 DIAGNOSIS — F32.A DEPRESSION, UNSPECIFIED DEPRESSION TYPE: Primary | ICD-10-CM

## 2022-10-06 DIAGNOSIS — R53.82 CHRONIC FATIGUE: ICD-10-CM

## 2022-10-06 DIAGNOSIS — F41.9 ANXIETY: ICD-10-CM

## 2022-10-06 LAB
FERRITIN: 25 NG/ML (ref 13–150)
HCT VFR BLD CALC: 40.1 % (ref 36.3–47.1)
HEMOGLOBIN: 12.6 G/DL (ref 11.9–15.1)
IRON SATURATION: 15 % (ref 20–55)
IRON: 51 UG/DL (ref 37–145)
MCH RBC QN AUTO: 29.6 PG (ref 25.2–33.5)
MCHC RBC AUTO-ENTMCNC: 31.4 G/DL (ref 28.4–34.8)
MCV RBC AUTO: 94.4 FL (ref 82.6–102.9)
NRBC AUTOMATED: 0 PER 100 WBC
PDW BLD-RTO: 14 % (ref 11.8–14.4)
PLATELET # BLD: 393 K/UL (ref 138–453)
PMV BLD AUTO: 9.5 FL (ref 8.1–13.5)
RBC # BLD: 4.25 M/UL (ref 3.95–5.11)
TOTAL IRON BINDING CAPACITY: 350 UG/DL (ref 250–450)
TSH SERPL DL<=0.05 MIU/L-ACNC: 1.13 UIU/ML (ref 0.3–5)
UNSATURATED IRON BINDING CAPACITY: 299 UG/DL (ref 112–347)
VITAMIN D 25-HYDROXY: 15.8 NG/ML
WBC # BLD: 7.9 K/UL (ref 3.5–11.3)

## 2022-10-06 PROCEDURE — 99214 OFFICE O/P EST MOD 30 MIN: CPT

## 2022-10-06 RX ORDER — FLUVOXAMINE MALEATE 25 MG
25 TABLET ORAL NIGHTLY
Qty: 90 TABLET | Refills: 1 | Status: SHIPPED | OUTPATIENT
Start: 2022-10-06 | End: 2023-01-04

## 2022-10-06 ASSESSMENT — ENCOUNTER SYMPTOMS
NAUSEA: 0
SHORTNESS OF BREATH: 0
CONSTIPATION: 0
COUGH: 0
DIARRHEA: 0
WHEEZING: 0
VOMITING: 0

## 2022-10-06 NOTE — PROGRESS NOTES
Farideh Giraldo (:  1980) is a 39 y.o. female,Established patient, here for evaluation of the following chief complaint(s):  Discuss Medications         ASSESSMENT/PLAN:  1. Depression, unspecified depression type  -     fluvoxaMINE (LUVOX) 25 MG tablet; Take 1 tablet by mouth nightly, Disp-90 tablet, R-1Normal  2. Anxiety  -     fluvoxaMINE (LUVOX) 25 MG tablet; Take 1 tablet by mouth nightly, Disp-90 tablet, R-1Normal  3. Chronic fatigue  -     CBC; Future  -     Vitamin D 25 Hydroxy; Future  -     TSH with Reflex; Future  -     Iron and TIBC; Future  -     Ferritin; Future    Complete labs as ordered  No changes in meds at this time  Continue therapy   Continue with routine specialist.   Encouarged healthy diet and exercise. Return in about 6 months (around 2023). Subjective   SUBJECTIVE/OBJECTIVE:  Patient is here today for follow-up for her anxiety and depression. Patient does state that she has noticed that her depression anxiety has greatly improved and has been well managed. Patient does states she has noticed she is having increased fatigue. She is constantly tired, and never feels rested. She is sleeping approximately 7 to 8 hours at night. She states she is able to fall asleep during her lunch on the table. She does have a previous diagnosis of mild obstructive sleep apnea. She states she previously used CPAP, but was unable to tolerate the mask due to claustrophobia. On chart review, patient also had a very low vitamin D in  and . Patient has not been taking any vitamin D supplement. We will do some additional labs today to rule out potential causes. She has been seeing CORRINA Aragon which she has found beneficial.       Review of Systems   Constitutional:  Positive for fatigue. Negative for activity change, appetite change, fever and unexpected weight change. HENT:  Negative for congestion and dental problem.     Respiratory:  Negative for cough, shortness of breath and wheezing. Cardiovascular:  Negative for chest pain and palpitations. Gastrointestinal:  Negative for constipation, diarrhea, nausea and vomiting. Dyspepsia well managed     Endocrine: Negative for polyphagia and polyuria. Genitourinary:  Negative for dysuria and urgency. Musculoskeletal:  Positive for arthralgias and myalgias. Negative for joint swelling. Sees ortho routinely   Skin:  Negative for rash and wound. Neurological:  Negative for weakness and headaches ( frequent migraines- sees Neurology). Psychiatric/Behavioral:  Positive for dysphoric mood ( has imporved). Negative for sleep disturbance and suicidal ideas. The patient is nervous/anxious (noted improvement). Depression and anxiety well managed with current meds          Objective   Physical Exam  Vitals reviewed. Constitutional:       General: She is not in acute distress. Appearance: She is not toxic-appearing. Cardiovascular:      Rate and Rhythm: Normal rate and regular rhythm. Heart sounds: Normal heart sounds. No murmur heard. Pulmonary:      Effort: Pulmonary effort is normal. No respiratory distress. Breath sounds: Normal breath sounds. No wheezing. Skin:     General: Skin is warm and dry. Neurological:      Mental Status: She is alert and oriented to person, place, and time. Mental status is at baseline. Psychiatric:         Mood and Affect: Mood normal.         Behavior: Behavior normal.         Thought Content: Thought content normal.          On this date 10/6/2022 I have spent 20 minutes reviewing previous notes, test results and face to face with the patient discussing the diagnosis and importance of compliance with the treatment plan as well as documenting on the day of the visit. An electronic signature was used to authenticate this note.     --ROSELINE Ibrahim - CNP

## 2022-10-13 ENCOUNTER — OFFICE VISIT (OUTPATIENT)
Dept: BEHAVIORAL/MENTAL HEALTH CLINIC | Age: 42
End: 2022-10-13
Payer: COMMERCIAL

## 2022-10-13 DIAGNOSIS — F41.1 GENERALIZED ANXIETY DISORDER: Primary | ICD-10-CM

## 2022-10-13 PROCEDURE — 90834 PSYTX W PT 45 MINUTES: CPT | Performed by: SOCIAL WORKER

## 2022-10-13 NOTE — PROGRESS NOTES
ADULT BEHAVIORAL HEALTH FOLLOW UP  JESSE Calero  Licensed Independent        Visit Date: 10/13/2022   Time of appointment:  [id number]   Time spent with Patient: 44 minutes. This is patient's 11th appointment. Reason for Consult:  Anxiety     Referring Provider/PCP:    No ref. provider found  ROSELINE Montes - CNP      Pt provided informed consent for the behavioral health program. Discussed with patient model of service to include the limits of confidentiality (i.e. abuse reporting, suicide intervention, etc.) and short-term intervention focused approach. Pt indicated understanding. Jaren Phan is a 39 y.o. female who presents for follow up of anxiety. She processed recent stressors, including training new dog and ex having a new girlfriend. We processed her feelings about this, and ways to cope, she was encouraged to do what she needs for herself. We reviewed ways to release her anger, including texting him to let him know his actions were hurtful, without concern how this will appear to others. We processed how allowing emotions lets us cope so we can move forward. Previous Recommendations: Ekaterina Martinez will work to allow her emotions in order to let go of them. She will follow up with Agata Viramontes in 3 weeks. MENTAL STATUS EXAM  Mood was within normal limits with calm affect. Suicidal ideation was denied. Homicidal ideation was denied. Hygiene was good . Dress was appropriate. Behavior was Within Normal Limits with No observation or self-report of difficulties ambulating. Attitude was Cooperative. Eye-contact was good. Speech: rate - WNL, rhythm - WNL, volume - WNL. Verbalizations were coherent. Thought processes were intact and goal-oriented without evidence of delusions, hallucinations, obsessions, or jose; with moderate cognitive distortions. Associations were characterized by intact cognitive processes.   Pt was oriented to person, place, time, and general circumstances;  recent:  good and remote:  good. Insight and judgment were estimated to be fair, AEB, a fair  understanding of cyclical maladaptive patterns, and the ability to use insight to inform behavior change. ASSESSMENT  Alexis Dove presented to the appointment today for evaluation and treatment of symptoms of anxiety. She is currently deemed no risk to herself or others and meets criteria for HONEY. Dorothy Bland was in agreement with recommendations. PHQ Scores 2/24/2022 1/17/2018   PHQ2 Score 3 2   PHQ9 Score 17 2     Interpretation of Total Score Depression Severity: 1-4 = Minimal depression, 5-9 = Mild depression, 10-14 = Moderate depression, 15-19 = Moderately severe depression, 20-27 = Severe depression    How often pt has had thoughts of death or hurting self (if PHQ positive for depression):       HONEY 7 SCORE 2/24/2022   HONEY-7 Total Score 16     Interpretation of HONEY-7 score: 5-9 = mild anxiety, 10-14 = moderate anxiety, 15+ = severe anxiety. Recommend referral to behavioral health for scores 10 or greater. DIAGNOSIS  Dorothy Bland was seen today for anxiety. Diagnoses and all orders for this visit:    Generalized anxiety disorder        INTERVENTION  Trained in strategies for increasing balanced thinking, Trained in relaxation strategies, Trained in improving communication skills, Discussed self-care (sleep, nutrition, rewarding activities, social support, exercise), and Supportive techniques      PLAN  Dorothy Bland will allow and express her emotions as needed. She will follow up with Lázaro Romero in 3 weeks. INTERACTIVE COMPLEXITY  Is interactive complexity present?   No  Reason:  N/A  Additional Supporting Information:  N/A       Electronically signed by JESSE Pisano on 10/13/22 at 11:36 AM EDT

## 2022-10-18 DIAGNOSIS — E55.9 VITAMIN D DEFICIENCY: Primary | ICD-10-CM

## 2022-10-18 RX ORDER — ERGOCALCIFEROL 1.25 MG/1
50000 CAPSULE ORAL WEEKLY
Qty: 12 CAPSULE | Refills: 0 | Status: SHIPPED | OUTPATIENT
Start: 2022-10-18 | End: 2023-01-04

## 2022-10-20 ENCOUNTER — OFFICE VISIT (OUTPATIENT)
Dept: ORTHOPEDIC SURGERY | Age: 42
End: 2022-10-20
Payer: COMMERCIAL

## 2022-10-20 VITALS
BODY MASS INDEX: 29.33 KG/M2 | DIASTOLIC BLOOD PRESSURE: 73 MMHG | HEIGHT: 69 IN | OXYGEN SATURATION: 100 % | SYSTOLIC BLOOD PRESSURE: 118 MMHG | HEART RATE: 105 BPM | WEIGHT: 198 LBS

## 2022-10-20 DIAGNOSIS — M25.851 FEMOROACETABULAR IMPINGEMENT OF RIGHT HIP: Primary | ICD-10-CM

## 2022-10-20 PROCEDURE — 99999 PR OFFICE/OUTPT VISIT,PROCEDURE ONLY: CPT | Performed by: PHYSICIAN ASSISTANT

## 2022-10-20 PROCEDURE — 20611 DRAIN/INJ JOINT/BURSA W/US: CPT | Performed by: PHYSICIAN ASSISTANT

## 2022-10-20 RX ORDER — TRIAMCINOLONE ACETONIDE 40 MG/ML
40 INJECTION, SUSPENSION INTRA-ARTICULAR; INTRAMUSCULAR ONCE
Status: COMPLETED | OUTPATIENT
Start: 2022-10-20 | End: 2022-10-20

## 2022-10-20 RX ORDER — LIDOCAINE HYDROCHLORIDE 10 MG/ML
2 INJECTION, SOLUTION INFILTRATION; PERINEURAL ONCE
Status: COMPLETED | OUTPATIENT
Start: 2022-10-20 | End: 2022-10-20

## 2022-10-20 RX ADMIN — TRIAMCINOLONE ACETONIDE 40 MG: 40 INJECTION, SUSPENSION INTRA-ARTICULAR; INTRAMUSCULAR at 13:38

## 2022-10-20 RX ADMIN — LIDOCAINE HYDROCHLORIDE 2 ML: 10 INJECTION, SOLUTION INFILTRATION; PERINEURAL at 13:38

## 2022-10-20 NOTE — PROGRESS NOTES
[address] [place] [address] [facility]  [address] [place] [address]  Dept: [phone]  Dept Fax: [phone]          Right Hip Visit - Follow up    Subjective:     Chief Complaint   Patient presents with    Follow-up     Right hip injection     HPI:     Ibis Ching presents today for Right hip pain. Patient is here today for cortisone injections into Right intra-articular hip. She had her last cortisone injection into bilateral greater trochanteric bursa on 9/22/2022. We had discussed that if she was still having groin pain in 4 weeks we could do an interarticular hip injection on the right. I have reviewed the CC, and if not present in this note, I have reviewed in the patient's chart. I agree with the documentation provided by other staff and have reviewed their documentation prior to providing my signature indicating agreement. Vitals:   /73   Pulse (!) 105   Ht 5' 9\" (1.753 m)   Wt 198 lb (89.8 kg)   SpO2 100%   BMI 29.24 kg/m²  Body mass index is 29.24 kg/m². Assessment:     1. Femoroacetabular impingement of right hip          Procedure:   Procedure: Yes    Jaime Lee Ann agreed today for a Corticosteroid injection into the right hip joint. The risks and the benefits of the procedure were discussed with the patient. The patient was placed in the supine position. Utilizing [employer] ultrasound unit with a variable frequency linear transducer for precise placement with the patient supine, the hip joint is found. Vascular settings on the ultrasound are used to identify the neurovascular in the groin. Utilizing sterile technique a 3 cc solution containing 2cc of 1% lidocaine  and 1 cc containing 40 mg of Kenalog was injected into the hip joint with a 22 gu. spinal needle. The wound was cleansed and a Band-Aid was placed.   The patient tolerated the procedure without difficulty. Adverse reactions of the injection was discussed with the patient including signs of infection (increasing pain, redness, swelling) and the patient was instructed to call immediately with any of these symptoms. The images are stored on SD card in the machine until downloaded to the patient's chart. Plan:   Patient should return to the clinic as needed to follow up with Arianna Liu PA-C. The patient will call the office immediately with any problems. Orders Placed This Encounter   Medications    lidocaine 1 % injection 2 mL    triamcinolone acetonide (KENALOG-40) injection 40 mg       No orders of the defined types were placed in this encounter.           Electronically signed by Roxane Julian PA-C, on 10/20/2022 at 1:36 PM

## 2022-11-03 ENCOUNTER — OFFICE VISIT (OUTPATIENT)
Dept: BEHAVIORAL/MENTAL HEALTH CLINIC | Age: 42
End: 2022-11-03
Payer: COMMERCIAL

## 2022-11-03 DIAGNOSIS — F41.1 GENERALIZED ANXIETY DISORDER: Primary | ICD-10-CM

## 2022-11-03 PROCEDURE — 90834 PSYTX W PT 45 MINUTES: CPT | Performed by: SOCIAL WORKER

## 2022-11-03 NOTE — PROGRESS NOTES
ADULT BEHAVIORAL HEALTH FOLLOW UP  Eunice Walsh JESSE  Licensed Independent        Visit Date: 11/3/2022   Time of appointment:  [id number]   Time spent with Patient: 40 minutes. This is patient's 12th appointment. Reason for Consult:  Stress and Anxiety     Referring Provider/PCP:    No ref. provider found  Carolyn Mckinnon, APRN - CNP      Pt provided informed consent for the behavioral health program. Discussed with patient model of service to include the limits of confidentiality (i.e. abuse reporting, suicide intervention, etc.) and short-term intervention focused approach. Pt indicated understanding. Lazarus Weber is a 39 y.o. female who presents for follow up of anxiety. She reports increased fatigue and \"blah\" feeling, reviewed how weather and lack of physical engagement may contribute to this, encouraged her to get back to yoga and try a light box to help increase exposure to sunlight. She reports continuing emotion about her ex, stuck between contacting or not, fearful of how this will look and solidifying it is over. She reports a lot of judgement of herself for this,encouraged her to remember emotions are a human experience and the less we fight them the easier they flow in and out. She was encouraged to do what feels right for her, reminding herself she deserves better than this person gave her. Previous Recommendations: Sammie Villarreal will allow and express her emotions as needed. She will follow up with Myrtle Judge in 3 weeks. MENTAL STATUS EXAM  Mood was anxious with anxious affect. Suicidal ideation was denied. Homicidal ideation was denied. Hygiene was good . Dress was appropriate. Behavior was Within Normal Limits with No observation or self-report of difficulties ambulating. Attitude was Cooperative. Eye-contact was good. Speech: rate - WNL, rhythm - WNL, volume - WNL. Verbalizations were coherent.   Thought processes were intact and goal-oriented without evidence of delusions, hallucinations, obsessions, or jose; with moderate cognitive distortions. Associations were characterized by intact cognitive processes. Pt was oriented to person, place, time, and general circumstances;  recent:  good and remote:  good. Insight and judgment were estimated to be fair, AEB, a fair  understanding of cyclical maladaptive patterns, and the ability to use insight to inform behavior change. ASSESSMENT  Beti Atkins presented to the appointment today for evaluation and treatment of symptoms of anxiety. She is currently deemed no risk to herself or others and meets criteria for HONEY. Sang Gomez was in agreement with recommendations. PHQ Scores 2/24/2022 1/17/2018   PHQ2 Score 3 2   PHQ9 Score 17 2     Interpretation of Total Score Depression Severity: 1-4 = Minimal depression, 5-9 = Mild depression, 10-14 = Moderate depression, 15-19 = Moderately severe depression, 20-27 = Severe depression    How often pt has had thoughts of death or hurting self (if PHQ positive for depression):       HONEY 7 SCORE 2/24/2022   HONEY-7 Total Score 16     Interpretation of HONEY-7 score: 5-9 = mild anxiety, 10-14 = moderate anxiety, 15+ = severe anxiety. Recommend referral to behavioral health for scores 10 or greater. DIAGNOSIS  Sang Gomez was seen today for stress and anxiety. Diagnoses and all orders for this visit:    Generalized anxiety disorder        INTERVENTION  Trained in strategies for increasing balanced thinking, Discussed self-care (sleep, nutrition, rewarding activities, social support, exercise), Discussed potential barriers to change, Supportive techniques, Provided Psychoeducation re: wave skill, and Identified maladaptive thoughts      PLAN  Sang Gomez will allow her emotions without change or judgement, in order to allow them to flow in and out. She will follow up with Dottie Quan in 1 month.       INTERACTIVE COMPLEXITY  Is interactive complexity present?   No  Reason:  N/A  Additional Supporting Information:  N/A       Electronically signed by JESSE Abraham on 11/3/22 at 2:39 PM EDT

## 2022-12-01 ENCOUNTER — OFFICE VISIT (OUTPATIENT)
Dept: PRIMARY CARE CLINIC | Age: 42
End: 2022-12-01
Payer: COMMERCIAL

## 2022-12-01 ENCOUNTER — OFFICE VISIT (OUTPATIENT)
Dept: BEHAVIORAL/MENTAL HEALTH CLINIC | Age: 42
End: 2022-12-01
Payer: COMMERCIAL

## 2022-12-01 VITALS
HEART RATE: 94 BPM | SYSTOLIC BLOOD PRESSURE: 118 MMHG | BODY MASS INDEX: 29.24 KG/M2 | OXYGEN SATURATION: 99 % | DIASTOLIC BLOOD PRESSURE: 84 MMHG | WEIGHT: 198 LBS | TEMPERATURE: 98.2 F

## 2022-12-01 DIAGNOSIS — T78.40XA ALLERGIC REACTION, INITIAL ENCOUNTER: Primary | ICD-10-CM

## 2022-12-01 DIAGNOSIS — F41.1 GENERALIZED ANXIETY DISORDER: Primary | ICD-10-CM

## 2022-12-01 PROCEDURE — 99214 OFFICE O/P EST MOD 30 MIN: CPT | Performed by: FAMILY MEDICINE

## 2022-12-01 PROCEDURE — 90832 PSYTX W PT 30 MINUTES: CPT | Performed by: SOCIAL WORKER

## 2022-12-01 RX ORDER — CETIRIZINE HYDROCHLORIDE 10 MG/1
10 TABLET ORAL DAILY
Qty: 30 TABLET | Refills: 0 | Status: SHIPPED | OUTPATIENT
Start: 2022-12-01

## 2022-12-01 RX ORDER — PREDNISONE 20 MG/1
40 TABLET ORAL DAILY
Qty: 10 TABLET | Refills: 0 | Status: SHIPPED | OUTPATIENT
Start: 2022-12-01 | End: 2022-12-06

## 2022-12-01 NOTE — PROGRESS NOTES
ADULT BEHAVIORAL HEALTH FOLLOW UP  JESSE Soliz  Licensed Independent        Visit Date: 12/1/2022   Time of appointment:  [id number]   Time spent with Patient: 36 minutes. This is patient's seventh appointment. Reason for Consult:  Anxiety and Stress     Referring Provider/PCP:    No ref. provider found  ROSELINE Leroy CNP      Pt provided informed consent for the behavioral health program. Discussed with patient model of service to include the limits of confidentiality (i.e. abuse reporting, suicide intervention, etc.) and short-term intervention focused approach. Pt indicated understanding. Mikhail Reveles is a 39 y.o. female who presents for follow up of anxiety and stress. She reports spoke to her ex, processed how this went and how she responded. We processed how she asserted herself, said what she needed to say, and had realistic expectations, which allowed her to handle this well. She was encouraged to use this to remind herself his true nature, and that no matter what she needs to do what is best for her. We also reviewed recent stress with coworkers at work, allowing herself to be more assertive and decide who she surrounds herself with. Previous Recommendations: Viky Burch will allow her emotions without change or judgement, in order to allow them to flow in and out. She will follow up with Lizeth Khan in 1 month. MENTAL STATUS EXAM  Mood was within normal limits with calm affect. Suicidal ideation was denied. Homicidal ideation was denied. Hygiene was good . Dress was appropriate. Behavior was Within Normal Limits with No observation or self-report of difficulties ambulating. Attitude was Cooperative. Eye-contact was good. Speech: rate - WNL, rhythm - WNL, volume - WNL. Verbalizations were coherent.   Thought processes were intact and goal-oriented without evidence of delusions, hallucinations, obsessions, or jose; with moderate cognitive distortions. Associations were characterized by intact cognitive processes. Pt was oriented to person, place, time, and general circumstances;  recent:  good and remote:  good. Insight and judgment were estimated to be fair, AEB, a fair  understanding of cyclical maladaptive patterns, and the ability to use insight to inform behavior change. ASSESSMENT  Nam Holder presented to the appointment today for evaluation and treatment of symptoms of stress. She is currently deemed no risk to herself or others and meets criteria for HONEY. Viky Burch was in agreement with recommendations. PHQ Scores 2/24/2022 1/17/2018   PHQ2 Score 3 2   PHQ9 Score 17 2     Interpretation of Total Score Depression Severity: 1-4 = Minimal depression, 5-9 = Mild depression, 10-14 = Moderate depression, 15-19 = Moderately severe depression, 20-27 = Severe depression    How often pt has had thoughts of death or hurting self (if PHQ positive for depression):       HONEY 7 SCORE 2/24/2022   HONEY-7 Total Score 16     Interpretation of HONEY-7 score: 5-9 = mild anxiety, 10-14 = moderate anxiety, 15+ = severe anxiety. Recommend referral to behavioral health for scores 10 or greater. DIAGNOSIS  Viky Burch was seen today for anxiety and stress. Diagnoses and all orders for this visit:    Generalized anxiety disorder        INTERVENTION  Trained in strategies for increasing balanced thinking, Trained in improving communication skills, Discussed self-care (sleep, nutrition, rewarding activities, social support, exercise), and Discussed potential barriers to change      PLAN  Viky Burch will continue healthy assertiveness of her needs. She will follow up with Lizeth Khan in 3 weeks. INTERACTIVE COMPLEXITY  Is interactive complexity present?   No  Reason:  N/A  Additional Supporting Information:  N/A       Electronically signed by JESSE Soliz on 12/1/22 at 11:39 AM EST

## 2022-12-01 NOTE — PROGRESS NOTES
Subjective:  Vahe Henley presents for   Chief Complaint   Patient presents with    Facial Swelling     Started Sunday. Eyes swollen and rash on her chin. Eyes are itching. Taking Benedryl. Was unable to wear her contacts until today. No known exposure that she thinks that would trigger it. States the eyelids are better but they are still swollen. No throat, toungue swelling, no difficulty breathing    Bendaryl helps        Patient Active Problem List   Diagnosis    Allergic rhinitis    GERD (gastroesophageal reflux disease)    Migraine without aura    Constipation    Generalized anxiety disorder    Hyperlipidemia    High risk HPV infection    Vasovagal syncope    Lichen sclerosus    Dysplasia of cervix, high grade CAMERON 2    Vulvar intraepithelial neoplasia (NELIDA) grade 1    S/P LEEP 7/5/18 with IUD removal    Left ovarian cyst    Other fatigue    Tension type headache    Syncope, near    Pain in left hip    Other chronic pain    Orthostatic hypotension    Lumbago with sciatica, right side    Functional dyspepsia    Exercise anaphylaxis    Early satiety    Disorder of adrenal gland (HCC)    Cushingoid facies    Vulvar pain         Objective:  Physical Exam   Vitals: Wt Readings from Last 3 Encounters:   12/01/22 198 lb (89.8 kg)   10/20/22 198 lb (89.8 kg)   10/06/22 195 lb (88.5 kg)     Ht Readings from Last 3 Encounters:   10/20/22 5' 9\" (1.753 m)   10/06/22 5' 9\" (1.753 m)   09/22/22 5' 9\" (1.753 m)     Body mass index is 29.24 kg/m². Vitals:    12/01/22 1234   BP: 118/84   Site: Right Upper Arm   Position: Sitting   Cuff Size: Medium Adult   Pulse: 94   Temp: 98.2 °F (36.8 °C)   SpO2: 99%   Weight: 198 lb (89.8 kg)       Constitutional: She is oriented to person, place, and time. She appears well-developed and well-nourished and in no acute distress. Answers all my questions appropriately. Head: Normocephalic and atraumatic.      Eyes:conjunctiva appear normal.    Right Ear: External ear normal. TM is clear  Left Ear: External ear normal. TM is clear    Nose: pink, non-edematous mucosa. No polyps. No septal deviation    Throat: no erythema, tonsillar hypertrophy or exudate. No ulcerations noted. Lips/Teeth/Gums all appear normal.    Neck: Normal range of motion. Neck supple. No tracheal deviation present. No abnormal lymphadenopathy. No JVD noted. Carotids are clear bilaterally. No thyroid masses noted. Heart: RRR without murmur. No S3, S4, or gallop noted. Chest:   Good breath sounds noted. Clear to auscultation bilaterally. No rales, wheezes, or rhonchi noted. No respiratory retractions noted. Wall has symmetrical movement with respirations. Assessment:   Encounter Diagnosis   Name Primary? Allergic reaction, initial encounter Yes         Plan:     I can't appreciate any significnant swelling, but I have never seen this patient before    Take the benadry prn    Use zyrtec x 4 weeks    There are no discontinued medications. THE ABOVE NOTED DISCONTINUED MEDS MAY ONLY BE FROM 'CLEANING UP' THE MED LIST AND WERE NOT ACTUALLY CANCELED;  SEE CHART FOR DETAILS! Orders Placed This Encounter   Medications    predniSONE (DELTASONE) 20 MG tablet     Sig: Take 2 tablets by mouth daily for 5 days     Dispense:  10 tablet     Refill:  0    cetirizine (ZYRTEC ALLERGY) 10 MG tablet     Sig: Take 1 tablet by mouth daily     Dispense:  30 tablet     Refill:  0     No orders of the defined types were placed in this encounter. Return in about 2 weeks (around 12/15/2022). There are no Patient Instructions on file for this visit.   Follow up with your provider

## 2022-12-22 ENCOUNTER — OFFICE VISIT (OUTPATIENT)
Dept: BEHAVIORAL/MENTAL HEALTH CLINIC | Age: 42
End: 2022-12-22
Payer: COMMERCIAL

## 2022-12-22 DIAGNOSIS — F41.1 GENERALIZED ANXIETY DISORDER: Primary | ICD-10-CM

## 2022-12-22 PROCEDURE — 90834 PSYTX W PT 45 MINUTES: CPT | Performed by: SOCIAL WORKER

## 2022-12-22 NOTE — PROGRESS NOTES
ADULT BEHAVIORAL HEALTH FOLLOW UP  Hiawatha Cheadle, LISW-S  Licensed Independent        Visit Date: 12/22/2022   Time of appointment:  1234-116p   Time spent with Patient: 42 minutes. This is patient's eighth appointment. Reason for Consult:  Anxiety and Stress     Referring Provider/PCP:    No ref. provider found  ROSELINE Aquino CNP      Pt provided informed consent for the behavioral health program. Discussed with patient model of service to include the limits of confidentiality (i.e. abuse reporting, suicide intervention, etc.) and short-term intervention focused approach. Pt indicated understanding. Vijaya Mathias is a 39 y.o. female who presents for follow up of anxiety and stress. She reports doing better, we processed continued difficult feelings about her ex, \"I wish I could just be over this\". She acknowledges feeling better until she sees him post of social media, we reviewed option to unfriend him in order to move forward in healing. We processed \"what if\" thoughts, with reminder of his past behavior and encouragement to question if this would even be good for her if he did want to return. She also reports feeling \"guilty for taking up space\", we processed history with family and comparisons, feeling not enough, leading to intrusive thoughts of not existing at times. She denies plan or intent to harm herself, we reviewed options to adjust her focus to creating a life worth living for herself. Previous Recommendations: Susan Velasco will continue healthy assertiveness of her needs. She will follow up with Keisha Lynch in 3 weeks. MENTAL STATUS EXAM  Mood was sad with sad  affect. Suicidal ideation was denied. Homicidal ideation was denied. Hygiene was good . Dress was appropriate. Behavior was Within Normal Limits with No observation or self-report of difficulties ambulating. Attitude was Cooperative. Eye-contact was good.   Speech: rate - WNL, rhythm - WNL, volume - WNL. Verbalizations were coherent. Thought processes were intact and goal-oriented without evidence of delusions, hallucinations, obsessions, or jose; with moderate cognitive distortions. Associations were characterized by intact cognitive processes. Pt was oriented to person, place, time, and general circumstances;  recent:  good and remote:  good. Insight and judgment were estimated to be fair, AEB, a fair  understanding of cyclical maladaptive patterns, and the ability to use insight to inform behavior change. ASSESSMENT  Alexis Dove presented to the appointment today for evaluation and treatment of symptoms of anxiety. She is currently deemed no risk to herself or others and meets criteria for HONEY. Dorothy Bland was in agreement with recommendations. PHQ Scores 2/24/2022 1/17/2018   PHQ2 Score 3 2   PHQ9 Score 17 2     Interpretation of Total Score Depression Severity: 1-4 = Minimal depression, 5-9 = Mild depression, 10-14 = Moderate depression, 15-19 = Moderately severe depression, 20-27 = Severe depression    How often pt has had thoughts of death or hurting self (if PHQ positive for depression):       HONEY 7 SCORE 2/24/2022   HONEY-7 Total Score 16     Interpretation of HONEY-7 score: 5-9 = mild anxiety, 10-14 = moderate anxiety, 15+ = severe anxiety. Recommend referral to behavioral health for scores 10 or greater. DIAGNOSIS  Dorothy Bland was seen today for anxiety and stress. Diagnoses and all orders for this visit:    Generalized anxiety disorder        INTERVENTION  Trained in strategies for increasing balanced thinking, Discussed self-care (sleep, nutrition, rewarding activities, social support, exercise), Discussed potential barriers to change, Supportive techniques, and Identified maladaptive thoughts      PLAN  Dorothy Bland will focus on people and things that bring her shae. She will follow up with Lázaro Romero in 3 weeks.       INTERACTIVE COMPLEXITY  Is interactive complexity present?   No  Reason:  N/A  Additional Supporting Information:  N/A       Electronically signed by JESSE Pisano on 12/22/22 at 12:35 PM EST

## 2023-01-12 ENCOUNTER — OFFICE VISIT (OUTPATIENT)
Dept: BEHAVIORAL/MENTAL HEALTH CLINIC | Age: 43
End: 2023-01-12
Payer: COMMERCIAL

## 2023-01-12 DIAGNOSIS — F41.1 GENERALIZED ANXIETY DISORDER: Primary | ICD-10-CM

## 2023-01-12 PROCEDURE — 90834 PSYTX W PT 45 MINUTES: CPT | Performed by: SOCIAL WORKER

## 2023-01-12 NOTE — PROGRESS NOTES
ADULT BEHAVIORAL HEALTH FOLLOW UP  KOFI Cazares-S  Licensed Independent        Visit Date: 1/12/2023   Time of appointment:  1205-1250p   Time spent with Patient: 45 minutes. This is patient's ninth appointment. Reason for Consult:  Stress and Anxiety     Referring Provider/PCP:    No ref. provider found  Romana Gilles, APRN - CNP      Pt provided informed consent for the behavioral health program. Discussed with patient model of service to include the limits of confidentiality (i.e. abuse reporting, suicide intervention, etc.) and short-term intervention focused approach. Pt indicated understanding. Spenser Serrato is a 43 y.o. female who presents for follow up of stress and anxiety. She processed sudden death of her father, including feelings, family, and how to move forward. She reports feeling numb today, worried she is not helping mother enough, unsure how the home will be now without him. We processed the path of grief, encouraged to allow herself to feel what she needs to, with reminder it will not look like the \"stages of grief\" and that is ok. Previous Recommendations: Kvng Ramey will focus on people and things that bring her shae. She will follow up with Chaya Connors in 3 weeks. MENTAL STATUS EXAM  Mood was sad with tearful affect. Suicidal ideation was denied. Homicidal ideation was denied. Hygiene was good . Dress was appropriate. Behavior was Within Normal Limits with No observation or self-report of difficulties ambulating. Attitude was Cooperative. Eye-contact was good. Speech: rate - WNL, rhythm - WNL, volume - WNL. Verbalizations were coherent. Thought processes were intact and goal-oriented without evidence of delusions, hallucinations, obsessions, or jose; with moderate cognitive distortions. Associations were characterized by intact cognitive processes.   Pt was oriented to person, place, time, and general circumstances;  recent: good and remote:  good. Insight and judgment were estimated to be fair, AEB, a fair  understanding of cyclical maladaptive patterns, and the ability to use insight to inform behavior change. ASSESSMENT  Luis A Samaniego presented to the appointment today for evaluation and treatment of symptoms of anxiety and stress. She is currently deemed no risk to herself or others and meets criteria for HONEY. Henry Zamorano was in agreement with recommendations. PHQ Scores 2/24/2022 1/17/2018   PHQ2 Score 3 2   PHQ9 Score 17 2     Interpretation of Total Score Depression Severity: 1-4 = Minimal depression, 5-9 = Mild depression, 10-14 = Moderate depression, 15-19 = Moderately severe depression, 20-27 = Severe depression    How often pt has had thoughts of death or hurting self (if PHQ positive for depression):       HONEY 7 SCORE 2/24/2022   HONEY-7 Total Score 16     Interpretation of HONEY-7 score: 5-9 = mild anxiety, 10-14 = moderate anxiety, 15+ = severe anxiety. Recommend referral to behavioral health for scores 10 or greater. DIAGNOSIS  Henry Zamorano was seen today for stress and anxiety. Diagnoses and all orders for this visit:    Generalized anxiety disorder        INTERVENTION  Trained in strategies for increasing balanced thinking, Discussed self-care (sleep, nutrition, rewarding activities, social support, exercise), Supportive techniques, and Provided Psychoeducation re: grief and loss      PLAN  Henry Zamorano will allow her emotions as they come. She will follow up with Barbra Russo in 2 weeks. INTERACTIVE COMPLEXITY  Is interactive complexity present?   No  Reason:  N/A  Additional Supporting Information:  N/A       Electronically signed by Claudette Dad, LISW-S on 1/12/23 at 12:06 PM EST

## 2023-01-26 ENCOUNTER — OFFICE VISIT (OUTPATIENT)
Dept: BEHAVIORAL/MENTAL HEALTH CLINIC | Age: 43
End: 2023-01-26
Payer: COMMERCIAL

## 2023-01-26 DIAGNOSIS — F41.1 GENERALIZED ANXIETY DISORDER: Primary | ICD-10-CM

## 2023-01-26 PROCEDURE — 90834 PSYTX W PT 45 MINUTES: CPT | Performed by: SOCIAL WORKER

## 2023-01-26 NOTE — PROGRESS NOTES
ADULT BEHAVIORAL HEALTH FOLLOW UP  JESSE Kang  Licensed Independent        Visit Date: 1/26/2023   Time of appointment:  7425-5530G   Time spent with Patient: 48 minutes. This is patient's tenth appointment. Reason for Consult:  Anxiety and Stress     Referring Provider/PCP:    No ref. provider found  ROSELINE Lennon CNP      Pt provided informed consent for the behavioral health program. Discussed with patient model of service to include the limits of confidentiality (i.e. abuse reporting, suicide intervention, etc.) and short-term intervention focused approach. Pt indicated understanding. Jonathon Villarreal is a 43 y.o. female who presents for follow up of anxiety and stress. She reviewed recent multiple stressors with her car, home, and friends. We focused primarily on issues with friends, feeling frustrated with recent interaction and apologizing when she feels she didn't need to. We reviewed healthy communication skills, and how lack of this often leads to saying things we regret. She was encouraged to prioritize her own needs, especially during this difficult time. Previous Recommendations: Orin Herve will allow her emotions as they come. She will follow up with Zoe Mae in 2 weeks. MENTAL STATUS EXAM  Mood was anxious with tearful affect. Suicidal ideation was denied. Homicidal ideation was denied. Hygiene was good . Dress was appropriate. Behavior was Within Normal Limits with No observation or self-report of difficulties ambulating. Attitude was Cooperative. Eye-contact was good. Speech: rate - WNL, rhythm - WNL, volume - WNL. Verbalizations were coherent. Thought processes were intact and goal-oriented without evidence of delusions, hallucinations, obsessions, or jose; with moderate cognitive distortions. Associations were characterized by intact cognitive processes.   Pt was oriented to person, place, time, and general circumstances;  recent:  good and remote:  good. Insight and judgment were estimated to be fair, AEB, a fair  understanding of cyclical maladaptive patterns, and the ability to use insight to inform behavior change. ASSESSMENT  Quinton Goncalves presented to the appointment today for evaluation and treatment of symptoms of anxiety. She is currently deemed no risk to herself or others and meets criteria for HONEY. Gisele Kulkarni was in agreement with recommendations. PHQ Scores 2/24/2022 1/17/2018   PHQ2 Score 3 2   PHQ9 Score 17 2     Interpretation of Total Score Depression Severity: 1-4 = Minimal depression, 5-9 = Mild depression, 10-14 = Moderate depression, 15-19 = Moderately severe depression, 20-27 = Severe depression    How often pt has had thoughts of death or hurting self (if PHQ positive for depression):       HONEY 7 SCORE 2/24/2022   HONEY-7 Total Score 16     Interpretation of HONEY-7 score: 5-9 = mild anxiety, 10-14 = moderate anxiety, 15+ = severe anxiety. Recommend referral to behavioral health for scores 10 or greater. DIAGNOSIS  Gisele Kulkarni was seen today for anxiety and stress. Diagnoses and all orders for this visit:    Generalized anxiety disorder        INTERVENTION  Trained in strategies for increasing balanced thinking, Trained in improving communication skills, Discussed self-care (sleep, nutrition, rewarding activities, social support, exercise), Discussed potential barriers to change, and Supportive techniques      PLAN  Gisele Kulkarni will utilize healthy communication skills with friends and will prioritize her own needs. She will follow up with Radha Prescott in 2 weeks. INTERACTIVE COMPLEXITY  Is interactive complexity present?   No  Reason:  N/A  Additional Supporting Information:  N/A       Electronically signed by JESSE Stephens on 1/26/23 at 10:03 AM EST

## 2023-02-02 DIAGNOSIS — F41.9 ANXIETY: ICD-10-CM

## 2023-02-02 DIAGNOSIS — F32.A DEPRESSION, UNSPECIFIED DEPRESSION TYPE: ICD-10-CM

## 2023-02-03 NOTE — TELEPHONE ENCOUNTER
Last visit: 10-6-22  Last Med refill: 10-6-22  Does patient have enough medication for 72 hours: Yes    Next Visit Date:  Future Appointments   Date Time Provider Ras Rodas   2/9/2023 11:00 AM JESSE Millard psyc [facility]   4/6/2023 10:30 AM ROSELINE Howard CNP [facility] [address] Maintenance   Topic Date Due    Varicella vaccine (1 of 2 - 2-dose childhood series) Never done    Lipids  02/24/2023    Depression Monitoring  02/24/2023    DTaP/Tdap/Td vaccine (2 - Td or Tdap) 10/16/2024    Cervical cancer screen  06/24/2026    Flu vaccine  Completed    COVID-19 Vaccine  Completed    Hepatitis C screen  Completed    HIV screen  Completed    Hepatitis A vaccine  Aged Out    Hib vaccine  Aged Out    Meningococcal (ACWY) vaccine  Aged Out    Pneumococcal 0-64 years Vaccine  Aged Out       No results found for: LABA1C          ( goal A1C is < 7)   No results found for: LABMICR  LDL Cholesterol (mg/dL)   Date Value   02/24/2022 117   02/06/2020 86       (goal LDL is <100)   AST (U/L)   Date Value   02/24/2022 20     ALT (U/L)   Date Value   02/24/2022 26     BUN (mg/dL)   Date Value   02/24/2022 12     BP Readings from Last 3 Encounters:   12/01/22 118/84   10/20/22 118/73   10/06/22 110/70          (goal 120/80)    All Future Testing planned in [facility]  Lab Frequency Next Occurrence               Patient Active Problem List:     Allergic rhinitis     GERD (gastroesophageal reflux disease)     Migraine without aura     Constipation     Generalized anxiety disorder     Hyperlipidemia     High risk HPV infection     Vasovagal syncope     Lichen sclerosus     Dysplasia of cervix, high grade CAMERON 2     Vulvar intraepithelial neoplasia (NELIDA) grade 1     S/P LEEP 7/5/18 with IUD removal     Left ovarian cyst     Other fatigue     Tension type headache     Syncope, near     Pain in left hip     Other chronic pain     Orthostatic hypotension     Lumbago with sciatica, right side Functional dyspepsia     Exercise anaphylaxis     Early satiety     Disorder of adrenal gland (HCC)     Cushingoid facies     Vulvar pain

## 2023-02-06 RX ORDER — FLUVOXAMINE MALEATE 25 MG
25 TABLET ORAL NIGHTLY
Qty: 90 TABLET | Refills: 1 | Status: SHIPPED | OUTPATIENT
Start: 2023-02-06 | End: 2023-05-07

## 2023-02-09 ENCOUNTER — OFFICE VISIT (OUTPATIENT)
Dept: BEHAVIORAL/MENTAL HEALTH CLINIC | Age: 43
End: 2023-02-09
Payer: COMMERCIAL

## 2023-02-09 DIAGNOSIS — F41.1 GENERALIZED ANXIETY DISORDER: Primary | ICD-10-CM

## 2023-02-09 PROCEDURE — 90834 PSYTX W PT 45 MINUTES: CPT | Performed by: SOCIAL WORKER

## 2023-02-09 NOTE — PROGRESS NOTES
ADULT BEHAVIORAL HEALTH FOLLOW UP  Gabrielle HaiJESSE minor  Licensed Independent        Visit Date: 2/9/2023   Time of appointment:  1102-1150a   Time spent with Patient: 48 minutes. This is patient's 11th appointment. Reason for Consult:  Stress and Anxiety     Referring Provider/PCP:    No ref. provider found  Clint Annalee, APRN - CNP      Pt provided informed consent for the behavioral health program. Discussed with patient model of service to include the limits of confidentiality (i.e. abuse reporting, suicide intervention, etc.) and short-term intervention focused approach. Pt indicated understanding. Rohan Hawkins is a 43 y.o. female who presents for follow up of stress and anxiety. She processed continued grief process, had a panic attack while driving, thinking about all her father will miss. We processed feelings of sadness and anger, as well as difficulty having to suppress this at work. We reviewed dual process of grief, ways to allow her grief emotions then move towards distraction, back and forth between the two. She was encouraged to not  herself for her feelings, especially anger, as this is understandable and very valid. Previous Recommendations: Lashon Aguiar will utilize healthy communication skills with friends and will prioritize her own needs. She will follow up with Perla Walden in 2 weeks. MENTAL STATUS EXAM  Mood was sad with sad  and tearful affect. Suicidal ideation was denied. Homicidal ideation was denied. Hygiene was good . Dress was appropriate. Behavior was Within Normal Limits with No observation or self-report of difficulties ambulating. Attitude was Cooperative. Eye-contact was good. Speech: rate - WNL, rhythm - WNL, volume - WNL. Verbalizations were coherent. Thought processes were intact and goal-oriented without evidence of delusions, hallucinations, obsessions, or jose; with moderate cognitive distortions. Associations were characterized by intact cognitive processes. Pt was oriented to person, place, time, and general circumstances;  recent:  good and remote:  good. Insight and judgment were estimated to be fair, AEB, a fair  understanding of cyclical maladaptive patterns, and the ability to use insight to inform behavior change. ASSESSMENT  Bobby Beltran presented to the appointment today for evaluation and treatment of symptoms of anxiety. She is currently deemed no risk to herself or others and meets criteria for HONEY. Lynne Swenson was in agreement with recommendations. PHQ Scores 2/24/2022 1/17/2018   PHQ2 Score 3 2   PHQ9 Score 17 2     Interpretation of Total Score Depression Severity: 1-4 = Minimal depression, 5-9 = Mild depression, 10-14 = Moderate depression, 15-19 = Moderately severe depression, 20-27 = Severe depression    How often pt has had thoughts of death or hurting self (if PHQ positive for depression):       HONEY 7 SCORE 2/24/2022   HONEY-7 Total Score 16     Interpretation of HONEY-7 score: 5-9 = mild anxiety, 10-14 = moderate anxiety, 15+ = severe anxiety. Recommend referral to behavioral health for scores 10 or greater. DIAGNOSIS  Lynne Swenson was seen today for stress and anxiety. Diagnoses and all orders for this visit:    Generalized anxiety disorder        INTERVENTION  Trained in strategies for increasing balanced thinking, Discussed self-care (sleep, nutrition, rewarding activities, social support, exercise), Supportive techniques, Provided Psychoeducation re: dual process of grief, and Identified maladaptive thoughts      PLAN  Lynne Swenson will continue to allow her emotions in a healthy manner. She will follow up with Marco A Zhu in 2 weeks. INTERACTIVE COMPLEXITY  Is interactive complexity present?   No  Reason:  N/A  Additional Supporting Information:  N/A       Electronically signed by JESSE Taylor on 2/9/23 at 11:03 AM EST

## 2023-02-23 ENCOUNTER — OFFICE VISIT (OUTPATIENT)
Dept: BEHAVIORAL/MENTAL HEALTH CLINIC | Age: 43
End: 2023-02-23
Payer: COMMERCIAL

## 2023-02-23 DIAGNOSIS — F41.1 GENERALIZED ANXIETY DISORDER: Primary | ICD-10-CM

## 2023-02-23 PROCEDURE — 90832 PSYTX W PT 30 MINUTES: CPT | Performed by: SOCIAL WORKER

## 2023-02-23 NOTE — PROGRESS NOTES
ADULT BEHAVIORAL HEALTH FOLLOW UP  JESSE Goins  Licensed Independent        Visit Date: 2/23/2023   Time of appointment:  8014-7861O   Time spent with Patient: 32 minutes. This is patient's 12th appointment. Reason for Consult:  Anxiety     Referring Provider/PCP:    No ref. provider found  ROSELINE Barksdale Asa - CNP      Pt provided informed consent for the behavioral health program. Discussed with patient model of service to include the limits of confidentiality (i.e. abuse reporting, suicide intervention, etc.) and short-term intervention focused approach. Pt indicated understanding. Isai Douglas is a 43 y.o. female who presents for follow up of anxiety. She reports doing better, has been updating her room and feels good about this. She processed stress with family and friends, communication options and how to choose when and which issues to address. She reports new dating interest, processed feelings and options about this, including discomfort with someone being nice and open with her. She was encouraged to allow this and keep moving forward to learn what she wants from relationships. Previous Recommendations: Claude Buoy will continue to allow her emotions in a healthy manner. She will follow up with Sheri Bryant in 2 weeks. MENTAL STATUS EXAM  Mood was within normal limits with calm affect. Suicidal ideation was denied. Homicidal ideation was denied. Hygiene was good . Dress was appropriate. Behavior was Within Normal Limits with No observation or self-report of difficulties ambulating. Attitude was Cooperative. Eye-contact was good. Speech: rate - WNL, rhythm - WNL, volume - WNL. Verbalizations were coherent. Thought processes were intact and goal-oriented without evidence of delusions, hallucinations, obsessions, or jose; with moderate cognitive distortions. Associations were characterized by intact cognitive processes.   Pt was oriented to person, place, time, and general circumstances;  recent:  good and remote:  good. Insight and judgment were estimated to be fair, AEB, a fair  understanding of cyclical maladaptive patterns, and the ability to use insight to inform behavior change. ASSESSMENT  Rayna Lancaster presented to the appointment today for evaluation and treatment of symptoms of anxiety. She is currently deemed no risk to herself or others and meets criteria for HONEY. Sharif Roque was in agreement with recommendations. PHQ Scores 2/24/2022 1/17/2018   PHQ2 Score 3 2   PHQ9 Score 17 2     Interpretation of Total Score Depression Severity: 1-4 = Minimal depression, 5-9 = Mild depression, 10-14 = Moderate depression, 15-19 = Moderately severe depression, 20-27 = Severe depression    How often pt has had thoughts of death or hurting self (if PHQ positive for depression):       HONEY 7 SCORE 2/24/2022   HONEY-7 Total Score 16     Interpretation of HONEY-7 score: 5-9 = mild anxiety, 10-14 = moderate anxiety, 15+ = severe anxiety. Recommend referral to behavioral health for scores 10 or greater. DIAGNOSIS  Sharif Roque was seen today for anxiety. Diagnoses and all orders for this visit:    Generalized anxiety disorder        INTERVENTION  Trained in strategies for increasing balanced thinking, Trained in improving communication skills, Discussed self-care (sleep, nutrition, rewarding activities, social support, exercise), Discussed potential barriers to change, and Supportive techniques      PLAN  Sharif Roque will continue healthy coping with stress and anxiety. She will follow up with Xiao Arevalo in 3 weeks. INTERACTIVE COMPLEXITY  Is interactive complexity present?   No  Reason:  N/A  Additional Supporting Information:  N/A       Electronically signed by JESSE Jerez on 2/23/23 at 11:04 AM EST

## 2023-03-16 ENCOUNTER — OFFICE VISIT (OUTPATIENT)
Dept: BEHAVIORAL/MENTAL HEALTH CLINIC | Age: 43
End: 2023-03-16
Payer: COMMERCIAL

## 2023-03-16 DIAGNOSIS — F41.1 GENERALIZED ANXIETY DISORDER: Primary | ICD-10-CM

## 2023-03-16 PROCEDURE — 90832 PSYTX W PT 30 MINUTES: CPT | Performed by: SOCIAL WORKER

## 2023-03-16 NOTE — PROGRESS NOTES
ADULT BEHAVIORAL HEALTH FOLLOW UP  JESSE Soliz  Licensed Independent        Visit Date: 3/16/2023   Time of appointment:  [id number]   Time spent with Patient: 37 minutes. This is patient's 13th appointment. Reason for Consult:  Anxiety     Referring Provider/PCP:    No ref. provider found  ROSELINE Leroy CNP      Pt provided informed consent for the behavioral health program. Discussed with patient model of service to include the limits of confidentiality (i.e. abuse reporting, suicide intervention, etc.) and short-term intervention focused approach. Pt indicated understanding. Mikhail Reveles is a 43 y.o. female who presents for follow up of anxiety. She reports increased fatigue and pain, needs to have hip surgery, we processed options for this. She reviewed stress at home with mother, desire to have fun and feeling guilty when she leaves her alone. She also reviewed stress with dating, pros and cons of new person. With each situation she was encouraged to prioritize her needs and trust her gut. Previous Recommendations: Viky Burch will continue healthy coping with stress and anxiety. She will follow up with Lizeth Khan in 3 weeks. MENTAL STATUS EXAM  Mood was anxious with anxious affect. Suicidal ideation was denied. Homicidal ideation was denied. Hygiene was good . Dress was appropriate. Behavior was Within Normal Limits with No observation or self-report of difficulties ambulating. Attitude was Cooperative. Eye-contact was good. Speech: rate - WNL, rhythm - WNL, volume - WNL. Verbalizations were coherent. Thought processes were intact and goal-oriented without evidence of delusions, hallucinations, obsessions, or jose; with moderate cognitive distortions. Associations were characterized by intact cognitive processes. Pt was oriented to person, place, time, and general circumstances;  recent:  good and remote:  good.   Insight and judgment were estimated to be fair, AEB, a fair  understanding of cyclical maladaptive patterns, and the ability to use insight to inform behavior change. ASSESSMENT  Stephanie Qiu presented to the appointment today for evaluation and treatment of symptoms of anxiety. She is currently deemed no risk to herself or others and meets criteria for HONEY. Claude Buoy was in agreement with recommendations. PHQ Scores 2/24/2022 1/17/2018   PHQ2 Score 3 2   PHQ9 Score 17 2     Interpretation of Total Score Depression Severity: 1-4 = Minimal depression, 5-9 = Mild depression, 10-14 = Moderate depression, 15-19 = Moderately severe depression, 20-27 = Severe depression    How often pt has had thoughts of death or hurting self (if PHQ positive for depression):       HONEY 7 SCORE 2/24/2022   HONEY-7 Total Score 16     Interpretation of HONEY-7 score: 5-9 = mild anxiety, 10-14 = moderate anxiety, 15+ = severe anxiety. Recommend referral to behavioral health for scores 10 or greater. DIAGNOSIS  Claude Buoy was seen today for anxiety. Diagnoses and all orders for this visit:    Generalized anxiety disorder        INTERVENTION  Trained in strategies for increasing balanced thinking, Trained in improving communication skills, Discussed self-care (sleep, nutrition, rewarding activities, social support, exercise), Discussed potential barriers to change, and Supportive techniques      PLAN  Claude Buoy will prioritize her own needs and trust her gut. She will follow up with Sheri Bryant in 1 month. INTERACTIVE COMPLEXITY  Is interactive complexity present?   No  Reason:  N/A  Additional Supporting Information:  N/A       Electronically signed by JESSE Goins on 3/16/23 at 11:09 AM EDT

## 2023-03-29 ENCOUNTER — OFFICE VISIT (OUTPATIENT)
Dept: PRIMARY CARE CLINIC | Age: 43
End: 2023-03-29
Payer: COMMERCIAL

## 2023-03-29 VITALS
SYSTOLIC BLOOD PRESSURE: 102 MMHG | DIASTOLIC BLOOD PRESSURE: 74 MMHG | HEART RATE: 68 BPM | OXYGEN SATURATION: 99 % | BODY MASS INDEX: 29.48 KG/M2 | TEMPERATURE: 98.3 F | WEIGHT: 199.6 LBS

## 2023-03-29 DIAGNOSIS — G89.29 CHRONIC MIDLINE THORACIC BACK PAIN: ICD-10-CM

## 2023-03-29 DIAGNOSIS — M54.2 NECK PAIN: ICD-10-CM

## 2023-03-29 DIAGNOSIS — M54.6 CHRONIC MIDLINE THORACIC BACK PAIN: ICD-10-CM

## 2023-03-29 DIAGNOSIS — R10.9 ABDOMINAL DISCOMFORT: Primary | ICD-10-CM

## 2023-03-29 PROCEDURE — 99214 OFFICE O/P EST MOD 30 MIN: CPT | Performed by: FAMILY MEDICINE

## 2023-03-29 RX ORDER — GABAPENTIN 100 MG/1
CAPSULE ORAL
COMMUNITY
Start: 2023-03-24

## 2023-03-29 RX ORDER — FAMOTIDINE 20 MG/1
20 TABLET, FILM COATED ORAL ONCE
Qty: 30 TABLET | Refills: 0 | Status: SHIPPED | OUTPATIENT
Start: 2023-03-29 | End: 2023-03-29

## 2023-03-29 NOTE — PROGRESS NOTES
Subjective:  Josefa Soto presents for   Chief Complaint   Patient presents with    Abdominal Pain     Sx started 3 weeks ago. Pt states that she has been going to the chiropractor and that isn't helping. Nausea    Neck Pain    Back Pain     In between her shoulder blades. Nasal Congestion    Headache     No vomtining. Feels hungry sometime eating makes it worse. Drinking water doesn't  irritate the gut. No black or bloody stools. No constipation or diarrhea  Taking naproxen daily for the past month for her hip. She is not sure how the neck/back pain fit inton this    Biofreeze helps with the musculoskeletal pan      Takes doxy routinely    Etoh - about 1 beer per week on average    Patient Active Problem List   Diagnosis    Allergic rhinitis    GERD (gastroesophageal reflux disease)    Migraine without aura    Constipation    Generalized anxiety disorder    Hyperlipidemia    High risk HPV infection    Vasovagal syncope    Lichen sclerosus    Dysplasia of cervix, high grade CAMERON 2    Vulvar intraepithelial neoplasia (NELIDA) grade 1    S/P LEEP 7/5/18 with IUD removal    Left ovarian cyst    Other fatigue    Tension type headache    Syncope, near    Pain in left hip    Other chronic pain    Orthostatic hypotension    Lumbago with sciatica, right side    Functional dyspepsia    Exercise anaphylaxis    Early satiety    Disorder of adrenal gland (HCC)    Cushingoid facies    Vulvar pain         Objective:  Physical Exam   Vitals: Wt Readings from Last 3 Encounters:   03/29/23 199 lb 9.6 oz (90.5 kg)   12/01/22 198 lb (89.8 kg)   10/20/22 198 lb (89.8 kg)     Ht Readings from Last 3 Encounters:   10/20/22 5' 9\" (1.753 m)   10/06/22 5' 9\" (1.753 m)   09/22/22 5' 9\" (1.753 m)     Body mass index is 29.48 kg/m².   Vitals:    03/29/23 0949   BP: 102/74   Site: Left Upper Arm   Position: Sitting   Cuff Size: Medium Adult   Pulse: 68   Temp: 98.3 °F (36.8 °C)   SpO2: 99%   Weight: 199 lb 9.6 oz (90.5 kg)

## 2023-04-03 ENCOUNTER — TELEPHONE (OUTPATIENT)
Dept: ORTHOPEDIC SURGERY | Age: 43
End: 2023-04-03

## 2023-04-03 NOTE — TELEPHONE ENCOUNTER
Pt send inbox msg req appt for Right hip injection with Jr Olivo between 4/6 - 4/20/23 . Donya Bowling  [place] [facility] [place] know Jr Olivo was out until 5/11 and we'd gladly schd her at the [facility] office Tues 4/11 or Wed 4/12, or if she req another date or location that [facility] is avail/bb

## 2023-04-06 ENCOUNTER — TELEPHONE (OUTPATIENT)
Dept: FAMILY MEDICINE CLINIC | Age: 43
End: 2023-04-06

## 2023-04-06 ENCOUNTER — OFFICE VISIT (OUTPATIENT)
Dept: FAMILY MEDICINE CLINIC | Age: 43
End: 2023-04-06
Payer: COMMERCIAL

## 2023-04-06 VITALS
WEIGHT: 198 LBS | OXYGEN SATURATION: 100 % | SYSTOLIC BLOOD PRESSURE: 100 MMHG | HEART RATE: 74 BPM | DIASTOLIC BLOOD PRESSURE: 70 MMHG | HEIGHT: 69 IN | BODY MASS INDEX: 29.33 KG/M2

## 2023-04-06 DIAGNOSIS — F41.9 ANXIETY: ICD-10-CM

## 2023-04-06 DIAGNOSIS — G47.9 SLEEPING DIFFICULTY: Primary | ICD-10-CM

## 2023-04-06 DIAGNOSIS — F32.A DEPRESSION, UNSPECIFIED DEPRESSION TYPE: ICD-10-CM

## 2023-04-06 PROCEDURE — 99214 OFFICE O/P EST MOD 30 MIN: CPT

## 2023-04-06 RX ORDER — FLUVOXAMINE MALEATE 50 MG/1
50 TABLET, COATED ORAL NIGHTLY
Qty: 30 TABLET | Refills: 3 | Status: SHIPPED | OUTPATIENT
Start: 2023-04-06

## 2023-04-06 SDOH — ECONOMIC STABILITY: FOOD INSECURITY: WITHIN THE PAST 12 MONTHS, THE FOOD YOU BOUGHT JUST DIDN'T LAST AND YOU DIDN'T HAVE MONEY TO GET MORE.: NEVER TRUE

## 2023-04-06 SDOH — ECONOMIC STABILITY: HOUSING INSECURITY
IN THE LAST 12 MONTHS, WAS THERE A TIME WHEN YOU DID NOT HAVE A STEADY PLACE TO SLEEP OR SLEPT IN A SHELTER (INCLUDING NOW)?: NO

## 2023-04-06 SDOH — ECONOMIC STABILITY: FOOD INSECURITY: WITHIN THE PAST 12 MONTHS, YOU WORRIED THAT YOUR FOOD WOULD RUN OUT BEFORE YOU GOT MONEY TO BUY MORE.: NEVER TRUE

## 2023-04-06 SDOH — ECONOMIC STABILITY: INCOME INSECURITY: HOW HARD IS IT FOR YOU TO PAY FOR THE VERY BASICS LIKE FOOD, HOUSING, MEDICAL CARE, AND HEATING?: NOT HARD AT ALL

## 2023-04-06 ASSESSMENT — ANXIETY QUESTIONNAIRES
2. NOT BEING ABLE TO STOP OR CONTROL WORRYING: 2
IF YOU CHECKED OFF ANY PROBLEMS ON THIS QUESTIONNAIRE, HOW DIFFICULT HAVE THESE PROBLEMS MADE IT FOR YOU TO DO YOUR WORK, TAKE CARE OF THINGS AT HOME, OR GET ALONG WITH OTHER PEOPLE: SOMEWHAT DIFFICULT
5. BEING SO RESTLESS THAT IT IS HARD TO SIT STILL: 2
6. BECOMING EASILY ANNOYED OR IRRITABLE: 3
7. FEELING AFRAID AS IF SOMETHING AWFUL MIGHT HAPPEN: 3
1. FEELING NERVOUS, ANXIOUS, OR ON EDGE: 2
4. TROUBLE RELAXING: 1
3. WORRYING TOO MUCH ABOUT DIFFERENT THINGS: 2
GAD7 TOTAL SCORE: 15

## 2023-04-06 ASSESSMENT — PATIENT HEALTH QUESTIONNAIRE - PHQ9
4. FEELING TIRED OR HAVING LITTLE ENERGY: 3
3. TROUBLE FALLING OR STAYING ASLEEP: 3
SUM OF ALL RESPONSES TO PHQ QUESTIONS 1-9: 18
SUM OF ALL RESPONSES TO PHQ QUESTIONS 1-9: 18
7. TROUBLE CONCENTRATING ON THINGS, SUCH AS READING THE NEWSPAPER OR WATCHING TELEVISION: 2
9. THOUGHTS THAT YOU WOULD BE BETTER OFF DEAD, OR OF HURTING YOURSELF: 1
SUM OF ALL RESPONSES TO PHQ QUESTIONS 1-9: 18
1. LITTLE INTEREST OR PLEASURE IN DOING THINGS: 1
10. IF YOU CHECKED OFF ANY PROBLEMS, HOW DIFFICULT HAVE THESE PROBLEMS MADE IT FOR YOU TO DO YOUR WORK, TAKE CARE OF THINGS AT HOME, OR GET ALONG WITH OTHER PEOPLE: 1
8. MOVING OR SPEAKING SO SLOWLY THAT OTHER PEOPLE COULD HAVE NOTICED. OR THE OPPOSITE, BEING SO FIGETY OR RESTLESS THAT YOU HAVE BEEN MOVING AROUND A LOT MORE THAN USUAL: 3
SUM OF ALL RESPONSES TO PHQ QUESTIONS 1-9: 17
2. FEELING DOWN, DEPRESSED OR HOPELESS: 1
SUM OF ALL RESPONSES TO PHQ9 QUESTIONS 1 & 2: 2
5. POOR APPETITE OR OVEREATING: 2
6. FEELING BAD ABOUT YOURSELF - OR THAT YOU ARE A FAILURE OR HAVE LET YOURSELF OR YOUR FAMILY DOWN: 2

## 2023-04-06 ASSESSMENT — ENCOUNTER SYMPTOMS
COUGH: 0
ABDOMINAL PAIN: 1
DIARRHEA: 0
CONSTIPATION: 0
SHORTNESS OF BREATH: 0

## 2023-04-06 NOTE — PROGRESS NOTES
Benji Aguila (:  1980) is a 43 y.o. female,Established patient, here for evaluation of the following chief complaint(s): Anxiety         ASSESSMENT/PLAN:  1. Anxiety  -     fluvoxaMINE (LUVOX) 50 MG tablet; Take 1 tablet by mouth nightly, Disp-30 tablet, R-3Normal  2. Depression, unspecified depression type  -     fluvoxaMINE (LUVOX) 50 MG tablet; Take 1 tablet by mouth nightly, Disp-30 tablet, R-3Normal    Increase Luvox to 50mg  No Melatonin due to high interaction with Luvox  Benadryl up to 50mg prn for sleeping. Advised not to use nightly. Continue therapy     Return in about 2 months (around 2023) for Anxiety/Depression- 2 months . Subjective   SUBJECTIVE/OBJECTIVE:  Patient here for follow up of her anxiety. Had a significant life event with her Dad passing away in January. Sudden heart attack. She does live at home and has been supporting Mom with help around the house, etc.  She continues to see Shital Gibson. She does not sleep great due to her bilateral hip pain and follows with otho. She has been taking Melatonin at night. Depression has worsened. Thoughts that she would be better off not here, nut would not consider suicide. Review of Systems   Constitutional:  Negative for activity change, chills and fatigue. Respiratory:  Negative for cough and shortness of breath. Cardiovascular:  Negative for chest pain and palpitations. Gastrointestinal:  Positive for abdominal pain (seen by walkin- limiting her naproxen and stopping doxy - has improved). Negative for constipation and diarrhea. Musculoskeletal:  Positive for arthralgias. Psychiatric/Behavioral:  Positive for dysphoric mood and sleep disturbance. The patient is nervous/anxious. Objective   Physical Exam  Vitals reviewed. Constitutional:       General: She is not in acute distress. Appearance: She is not toxic-appearing.    Cardiovascular:      Rate and Rhythm: Normal rate and

## 2023-05-04 ENCOUNTER — OFFICE VISIT (OUTPATIENT)
Dept: BEHAVIORAL/MENTAL HEALTH CLINIC | Age: 43
End: 2023-05-04
Payer: COMMERCIAL

## 2023-05-04 DIAGNOSIS — F41.1 GENERALIZED ANXIETY DISORDER: Primary | ICD-10-CM

## 2023-05-04 PROCEDURE — 90834 PSYTX W PT 45 MINUTES: CPT | Performed by: SOCIAL WORKER

## 2023-05-04 NOTE — PROGRESS NOTES
ADULT BEHAVIORAL HEALTH FOLLOW UP  JESSE Millard  Licensed Independent        Visit Date: 5/4/2023   Time of appointment:  [id number]   Time spent with Patient: 40 minutes. This is patient's 15th appointment. Reason for Consult:  Anxiety and Stress     Referring Provider/PCP:    No ref. provider found  ROSELINE Starr CNP      Pt provided informed consent for the behavioral health program. Discussed with patient model of service to include the limits of confidentiality (i.e. abuse reporting, suicide intervention, etc.) and short-term intervention focused approach. Pt indicated understanding. Ibis Mcclain is a 43 y.o. female who presents for follow up of anxiety and stress. She reports struggling with loss of father, realizing it has been 4 months since his death. We processed the waves of grief, how this time can be harder due to decreased support and lack of to dos to complete now. She reports family plans to go thru his belongings, dreading this, gently reminded her this is required, we have to feel the pain of grief in order to move thru it. We also reviewed recent new person she is dating, she was encouraged to utilize open communication to clarify intentions. Previous Recommendations: Bacilio Lopez will focus on taking care of her health. She will follow up with Adal Deng in 3 weeks. MENTAL STATUS EXAM  Mood was sad with sad  affect. Suicidal ideation was denied. Homicidal ideation was denied. Hygiene was good . Dress was appropriate. Behavior was Within Normal Limits with No observation or self-report of difficulties ambulating. Attitude was Cooperative. Eye-contact was good. Speech: rate - WNL, rhythm - WNL, volume - WNL. Verbalizations were coherent. Thought processes were intact and goal-oriented without evidence of delusions, hallucinations, obsessions, or jose; with moderate cognitive distortions.    Associations were characterized

## 2023-05-25 ENCOUNTER — OFFICE VISIT (OUTPATIENT)
Dept: BEHAVIORAL/MENTAL HEALTH CLINIC | Age: 43
End: 2023-05-25
Payer: COMMERCIAL

## 2023-05-25 DIAGNOSIS — F41.1 GENERALIZED ANXIETY DISORDER: Primary | ICD-10-CM

## 2023-05-25 PROCEDURE — 90834 PSYTX W PT 45 MINUTES: CPT | Performed by: SOCIAL WORKER

## 2023-05-25 NOTE — PROGRESS NOTES
ADULT BEHAVIORAL HEALTH FOLLOW UP  Gabrielleaaron VallesJESSE minor  Licensed Independent        Visit Date: 5/25/2023   Time of appointment:  [id number]   Time spent with Patient: 45 minutes. This is patient's 16th appointment. Reason for Consult:  Stress and Anxiety     Referring Provider/PCP:    No ref. provider found  Clint Zambian, APRN - CNP      Pt provided informed consent for the behavioral health program. Discussed with patient model of service to include the limits of confidentiality (i.e. abuse reporting, suicide intervention, etc.) and short-term intervention focused approach. Pt indicated understanding. Rohan Hawkins is a 43 y.o. female who presents for follow up of stress and anxiety. She reports increased upsetting moments related to father, becoming upset with these, tearful while talking about. She states \"I'm tired of this\", we processed pain and difficulty of grief as well as need to feel this, allow the waves to come so they can go down and become less severe. She reports plan to go thru father's belongings, we processed feelings and worries about this. She was encouraged to share her feelings with mother, so they can possibly help each other thru this. Previous Recommendations: Lashon Aguiar will allow feelings of grief as they arise. She will follow up with Perla Walden in 3 weeks. MENTAL STATUS EXAM  Mood was sad with sad  and tearful affect. Suicidal ideation was denied. Homicidal ideation was denied. Hygiene was good . Dress was appropriate. Behavior was Within Normal Limits with No observation or self-report of difficulties ambulating. Attitude was Cooperative. Eye-contact was good. Speech: rate - WNL, rhythm - WNL, volume - WNL. Verbalizations were coherent. Thought processes were intact and goal-oriented without evidence of delusions, hallucinations, obsessions, or jose; with moderate cognitive distortions.    Associations were characterized

## 2023-06-08 ENCOUNTER — OFFICE VISIT (OUTPATIENT)
Dept: BEHAVIORAL/MENTAL HEALTH CLINIC | Age: 43
End: 2023-06-08
Payer: COMMERCIAL

## 2023-06-08 ENCOUNTER — OFFICE VISIT (OUTPATIENT)
Dept: FAMILY MEDICINE CLINIC | Age: 43
End: 2023-06-08
Payer: COMMERCIAL

## 2023-06-08 VITALS
WEIGHT: 198 LBS | SYSTOLIC BLOOD PRESSURE: 90 MMHG | HEART RATE: 82 BPM | BODY MASS INDEX: 29.33 KG/M2 | DIASTOLIC BLOOD PRESSURE: 76 MMHG | HEIGHT: 69 IN | OXYGEN SATURATION: 97 %

## 2023-06-08 DIAGNOSIS — E27.9 DISORDER OF ADRENAL GLAND (HCC): ICD-10-CM

## 2023-06-08 DIAGNOSIS — Z82.49 FAMILY HISTORY OF CARDIAC ARREST: ICD-10-CM

## 2023-06-08 DIAGNOSIS — R07.89 PRESSURE IN CHEST: Primary | ICD-10-CM

## 2023-06-08 DIAGNOSIS — F41.1 GENERALIZED ANXIETY DISORDER: Primary | ICD-10-CM

## 2023-06-08 DIAGNOSIS — E55.9 VITAMIN D DEFICIENCY: ICD-10-CM

## 2023-06-08 PROCEDURE — 90834 PSYTX W PT 45 MINUTES: CPT | Performed by: SOCIAL WORKER

## 2023-06-08 PROCEDURE — 99214 OFFICE O/P EST MOD 30 MIN: CPT

## 2023-06-08 RX ORDER — DEXAMETHASONE 1 MG
1 TABLET ORAL ONCE
Qty: 1 TABLET | Refills: 0 | Status: SHIPPED | OUTPATIENT
Start: 2023-06-08 | End: 2023-06-08

## 2023-06-08 NOTE — PROGRESS NOTES
ADULT BEHAVIORAL HEALTH FOLLOW UP  JESSE Upton  Licensed Independent        Visit Date: 6/8/2023   Time of appointment:  [id number]   Time spent with Patient: 48 minutes. This is patient's 17th appointment. Reason for Consult:  Stress and Anxiety     Referring Provider/PCP:    No ref. provider found  ROSELINE Metzger CNP      Pt provided informed consent for the behavioral health program. Discussed with patient model of service to include the limits of confidentiality (i.e. abuse reporting, suicide intervention, etc.) and short-term intervention focused approach. Pt indicated understanding. Anders Sanchez is a 43 y.o. female who presents for follow up of stress and anxiety. She reviewed recent stress with root canal and going through her father's things with family. We processed both, states should be resolving soon. She reports concern of possible panic attacks vs heart issues, encouraged her to follow up with testing ordered to confirm. She states she feels she has \"no purpose\", we reviewed work history, lack of use of degrees, options, and barriers. She was encouraged to take small steps towards something new, considering all her options, and prioritizing her own needs. Previous Recommendations: Vahe Henley will continue healthy coping skills for stress and grief. She will follow up with Sydnie Jc in 2 weeks. MENTAL STATUS EXAM  Mood was anxious with anxious affect. Suicidal ideation was denied. Homicidal ideation was denied. Hygiene was good . Dress was appropriate. Behavior was Within Normal Limits with No observation or self-report of difficulties ambulating. Attitude was Cooperative. Eye-contact was good. Speech: rate - WNL, rhythm - WNL, volume - WNL. Verbalizations were coherent. Thought processes were intact and goal-oriented without evidence of delusions, hallucinations, obsessions, or jose; with moderate cognitive distortions.

## 2023-06-23 LAB
VITAMIN D 25-HYDROXY: 30.7
VITAMIN D2, 25 HYDROXY: NORMAL
VITAMIN D3,25 HYDROXY: NORMAL

## 2023-06-27 DIAGNOSIS — E27.9 DISORDER OF ADRENAL GLAND (HCC): ICD-10-CM

## 2023-06-27 LAB — CORTISOL: 0.6

## 2023-06-29 ENCOUNTER — OFFICE VISIT (OUTPATIENT)
Dept: BEHAVIORAL/MENTAL HEALTH CLINIC | Age: 43
End: 2023-06-29
Payer: COMMERCIAL

## 2023-06-29 DIAGNOSIS — E55.9 VITAMIN D DEFICIENCY: ICD-10-CM

## 2023-06-29 DIAGNOSIS — E27.9 DISORDER OF ADRENAL GLAND (HCC): ICD-10-CM

## 2023-06-29 DIAGNOSIS — F41.1 GENERALIZED ANXIETY DISORDER: Primary | ICD-10-CM

## 2023-06-29 PROCEDURE — 90834 PSYTX W PT 45 MINUTES: CPT | Performed by: SOCIAL WORKER

## 2023-06-30 ASSESSMENT — ENCOUNTER SYMPTOMS
DIARRHEA: 0
CONSTIPATION: 0
SHORTNESS OF BREATH: 0
ABDOMINAL PAIN: 0
COUGH: 0

## 2023-07-03 DIAGNOSIS — E27.9 DISORDER OF ADRENAL GLAND (HCC): ICD-10-CM

## 2023-07-20 ENCOUNTER — OFFICE VISIT (OUTPATIENT)
Dept: BEHAVIORAL/MENTAL HEALTH CLINIC | Age: 43
End: 2023-07-20
Payer: COMMERCIAL

## 2023-07-20 DIAGNOSIS — F41.1 GENERALIZED ANXIETY DISORDER: Primary | ICD-10-CM

## 2023-07-20 PROCEDURE — 90834 PSYTX W PT 45 MINUTES: CPT | Performed by: SOCIAL WORKER

## 2023-07-20 NOTE — PROGRESS NOTES
ADULT BEHAVIORAL HEALTH FOLLOW UP  JESSE Beckford  Licensed Independent        Visit Date: 2023   Time of appointment:  [id number]   Time spent with Patient: 50 minutes. This is patient's  19th  appointment. Reason for Consult:  Stress     Referring Provider/PCP:    No ref. provider found  Jyoti Steel, APRN - CNP      Pt provided informed consent for the behavioral health program. Discussed with patient model of service to include the limits of confidentiality (i.e. abuse reporting, suicide intervention, etc.) and short-term intervention focused approach. Pt indicated understanding. Cris Lucas is a 43 y.o. female who presents for follow up of stress. She reports an emotional week due to father's birthday, parents anniversary, and . We processed her feelings, normalized this experience and response. She often states \"I don't want to upset people\", reminded her other people's feelings are not her responsibility and she needs to be able to express her emotions. We also reviewed the grieving process, how this goes better with support of others, encouraged healthy communication with family to allow this. Previous Recommendations: Lakesha Jeffers will prioritize herself and remind herself this does not make her a bad person. She will follow up with Pj Torres in 3 weeks. MENTAL STATUS EXAM  Mood was sad with sad  and tearful affect. Suicidal ideation was denied. Homicidal ideation was denied. Hygiene was good . Dress was appropriate. Behavior was Within Normal Limits with No observation or self-report of difficulties ambulating. Attitude was Cooperative. Eye-contact was good. Speech: rate - WNL, rhythm - WNL, volume - WNL. Verbalizations were coherent. Thought processes were intact and goal-oriented without evidence of delusions, hallucinations, obsessions, or jose; with moderate cognitive distortions.    Associations were characterized by

## 2023-07-26 DIAGNOSIS — E27.9 DISORDER OF ADRENAL GLAND (HCC): Primary | ICD-10-CM

## 2023-08-10 ENCOUNTER — OFFICE VISIT (OUTPATIENT)
Dept: BEHAVIORAL/MENTAL HEALTH CLINIC | Age: 43
End: 2023-08-10
Payer: COMMERCIAL

## 2023-08-10 DIAGNOSIS — F41.1 GENERALIZED ANXIETY DISORDER: Primary | ICD-10-CM

## 2023-08-10 PROCEDURE — 90834 PSYTX W PT 45 MINUTES: CPT | Performed by: SOCIAL WORKER

## 2023-08-10 NOTE — PROGRESS NOTES
ADULT BEHAVIORAL HEALTH FOLLOW UP  JESSE Hidalgo  Licensed Independent        Visit Date: 8/10/2023   Time of appointment:  [id number]   Time spent with Patient: 50 minutes. This is patient's  20th  appointment. Reason for Consult:  Stress and Anxiety     Referring Provider/PCP:    No ref. provider found  ROSELINE Painter CNP      Pt provided informed consent for the behavioral health program. Discussed with patient model of service to include the limits of confidentiality (i.e. abuse reporting, suicide intervention, etc.) and short-term intervention focused approach. Pt indicated understanding. Maricarmen Jones is a 43 y.o. female who presents for follow up of stress. She reports incident with sister, in which she stood up for herself after being told she \"does nothing\" around the home. Validated this and encouraged her to continue to do so as needed, so it doesn't come out how she regrets it. She also reports stress at work, feeling like she is \"living in a haze\" and unsure where to go from here. We processed overall stress over last few months, to be patient with herself and take her time in moving forward. 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 Previous Recommendations: Francisco Denney will prioritize herself and emotion expression. She will follow up with Kadeem Rosenbaum in 3 weeks. MENTAL STATUS EXAM  Mood was within normal limits with calm affect. Suicidal ideation was denied. Homicidal ideation was denied. Hygiene was good . Dress was appropriate. Behavior was Within Normal Limits with No observation or self-report of difficulties ambulating. Attitude was Cooperative. Eye-contact was good. Speech: rate - WNL, rhythm - WNL, volume - WNL. Verbalizations were coherent. Thought processes were intact and goal-oriented without evidence of delusions, hallucinations, obsessions, or jose; with moderate cognitive distortions.

## 2023-08-15 DIAGNOSIS — F32.A DEPRESSION, UNSPECIFIED DEPRESSION TYPE: ICD-10-CM

## 2023-08-15 DIAGNOSIS — F41.9 ANXIETY: ICD-10-CM

## 2023-08-15 NOTE — TELEPHONE ENCOUNTER
LOV: 6/8/2023    RTO:   Future Appointments   Date Time Provider [address]   12/14/2023 10:00 AM ROSELINE Ahumada - CNP [facility]     LRF: 4/6/2023          Controlled Substance Monitoring:    Acute and Chronic Pain Monitoring:   No flowsheet data found.

## 2023-08-17 RX ORDER — FLUVOXAMINE MALEATE 50 MG/1
50 TABLET, COATED ORAL NIGHTLY
Qty: 30 TABLET | Refills: 5 | Status: SHIPPED | OUTPATIENT
Start: 2023-08-17 | End: 2024-08-17

## 2023-08-30 ENCOUNTER — ANESTHESIA (OUTPATIENT)
Dept: OPERATING ROOM | Age: 43
End: 2023-08-30
Payer: COMMERCIAL

## 2023-08-30 ENCOUNTER — HOSPITAL ENCOUNTER (OUTPATIENT)
Age: 43
Setting detail: OUTPATIENT SURGERY
Discharge: HOME | End: 2023-08-30
Attending: INTERNAL MEDICINE | Admitting: INTERNAL MEDICINE
Payer: COMMERCIAL

## 2023-08-30 ENCOUNTER — ANESTHESIA EVENT (OUTPATIENT)
Dept: OPERATING ROOM | Age: 43
End: 2023-08-30
Payer: COMMERCIAL

## 2023-08-30 VITALS
TEMPERATURE: 97 F | SYSTOLIC BLOOD PRESSURE: 109 MMHG | WEIGHT: 202.3 LBS | HEIGHT: 69 IN | RESPIRATION RATE: 20 BRPM | BODY MASS INDEX: 29.96 KG/M2 | HEART RATE: 84 BPM | OXYGEN SATURATION: 100 % | DIASTOLIC BLOOD PRESSURE: 70 MMHG

## 2023-08-30 LAB — HCG, PREGNANCY URINE (POC): NEGATIVE

## 2023-08-30 PROCEDURE — 3609019000 HC EGD CAPSULE ENDOSCOPY: Performed by: INTERNAL MEDICINE

## 2023-08-30 PROCEDURE — 2580000003 HC RX 258

## 2023-08-30 PROCEDURE — 7100000010 HC PHASE II RECOVERY - FIRST 15 MIN: Performed by: INTERNAL MEDICINE

## 2023-08-30 PROCEDURE — 2709999900 HC NON-CHARGEABLE SUPPLY: Performed by: INTERNAL MEDICINE

## 2023-08-30 PROCEDURE — 6360000002 HC RX W HCPCS: Performed by: NURSE ANESTHETIST, CERTIFIED REGISTERED

## 2023-08-30 PROCEDURE — 7100000011 HC PHASE II RECOVERY - ADDTL 15 MIN: Performed by: INTERNAL MEDICINE

## 2023-08-30 PROCEDURE — 3700000001 HC ADD 15 MINUTES (ANESTHESIA): Performed by: INTERNAL MEDICINE

## 2023-08-30 PROCEDURE — 2720000010 HC SURG SUPPLY STERILE: Performed by: INTERNAL MEDICINE

## 2023-08-30 PROCEDURE — 3700000000 HC ANESTHESIA ATTENDED CARE: Performed by: INTERNAL MEDICINE

## 2023-08-30 PROCEDURE — 81025 URINE PREGNANCY TEST: CPT

## 2023-08-30 PROCEDURE — 2500000003 HC RX 250 WO HCPCS: Performed by: NURSE ANESTHETIST, CERTIFIED REGISTERED

## 2023-08-30 PROCEDURE — 2580000003 HC RX 258: Performed by: NURSE ANESTHETIST, CERTIFIED REGISTERED

## 2023-08-30 RX ORDER — ONDANSETRON 2 MG/ML
4 INJECTION INTRAMUSCULAR; INTRAVENOUS
Status: CANCELLED | OUTPATIENT
Start: 2023-08-30 | End: 2023-08-31

## 2023-08-30 RX ORDER — LIDOCAINE HYDROCHLORIDE 20 MG/ML
INJECTION, SOLUTION INFILTRATION; PERINEURAL PRN
Status: DISCONTINUED | OUTPATIENT
Start: 2023-08-30 | End: 2023-08-30 | Stop reason: SDUPTHER

## 2023-08-30 RX ORDER — SODIUM CHLORIDE 0.9 % (FLUSH) 0.9 %
5-40 SYRINGE (ML) INJECTION EVERY 12 HOURS SCHEDULED
Status: CANCELLED | OUTPATIENT
Start: 2023-08-30

## 2023-08-30 RX ORDER — LIDOCAINE HYDROCHLORIDE 10 MG/ML
1 INJECTION, SOLUTION EPIDURAL; INFILTRATION; INTRACAUDAL; PERINEURAL
Status: DISCONTINUED | OUTPATIENT
Start: 2023-08-31 | End: 2023-08-30 | Stop reason: HOSPADM

## 2023-08-30 RX ORDER — SODIUM CHLORIDE 0.9 % (FLUSH) 0.9 %
5-40 SYRINGE (ML) INJECTION EVERY 12 HOURS SCHEDULED
Status: DISCONTINUED | OUTPATIENT
Start: 2023-08-30 | End: 2023-08-30 | Stop reason: HOSPADM

## 2023-08-30 RX ORDER — SODIUM CHLORIDE, SODIUM LACTATE, POTASSIUM CHLORIDE, CALCIUM CHLORIDE 600; 310; 30; 20 MG/100ML; MG/100ML; MG/100ML; MG/100ML
INJECTION, SOLUTION INTRAVENOUS CONTINUOUS PRN
Status: DISCONTINUED | OUTPATIENT
Start: 2023-08-30 | End: 2023-08-30 | Stop reason: SDUPTHER

## 2023-08-30 RX ORDER — DOXYCYCLINE HYCLATE 100 MG/1
100 CAPSULE ORAL 2 TIMES DAILY
COMMUNITY
Start: 2023-08-10

## 2023-08-30 RX ORDER — SODIUM CHLORIDE 9 MG/ML
INJECTION, SOLUTION INTRAVENOUS CONTINUOUS
Status: DISCONTINUED | OUTPATIENT
Start: 2023-08-30 | End: 2023-08-30 | Stop reason: HOSPADM

## 2023-08-30 RX ORDER — SODIUM CHLORIDE, SODIUM LACTATE, POTASSIUM CHLORIDE, CALCIUM CHLORIDE 600; 310; 30; 20 MG/100ML; MG/100ML; MG/100ML; MG/100ML
INJECTION, SOLUTION INTRAVENOUS CONTINUOUS
Status: DISCONTINUED | OUTPATIENT
Start: 2023-08-30 | End: 2023-08-30 | Stop reason: HOSPADM

## 2023-08-30 RX ORDER — SODIUM CHLORIDE 0.9 % (FLUSH) 0.9 %
5-40 SYRINGE (ML) INJECTION PRN
Status: CANCELLED | OUTPATIENT
Start: 2023-08-30

## 2023-08-30 RX ORDER — FENTANYL CITRATE 50 UG/ML
25 INJECTION, SOLUTION INTRAMUSCULAR; INTRAVENOUS EVERY 5 MIN PRN
Status: CANCELLED | OUTPATIENT
Start: 2023-08-30

## 2023-08-30 RX ORDER — SODIUM CHLORIDE 9 MG/ML
INJECTION, SOLUTION INTRAVENOUS PRN
Status: DISCONTINUED | OUTPATIENT
Start: 2023-08-30 | End: 2023-08-30 | Stop reason: HOSPADM

## 2023-08-30 RX ORDER — SODIUM CHLORIDE 0.9 % (FLUSH) 0.9 %
5-40 SYRINGE (ML) INJECTION PRN
Status: DISCONTINUED | OUTPATIENT
Start: 2023-08-30 | End: 2023-08-30 | Stop reason: HOSPADM

## 2023-08-30 RX ORDER — SODIUM CHLORIDE 9 MG/ML
INJECTION, SOLUTION INTRAVENOUS PRN
Status: CANCELLED | OUTPATIENT
Start: 2023-08-30

## 2023-08-30 RX ORDER — PROPOFOL 10 MG/ML
INJECTION, EMULSION INTRAVENOUS PRN
Status: DISCONTINUED | OUTPATIENT
Start: 2023-08-30 | End: 2023-08-30 | Stop reason: SDUPTHER

## 2023-08-30 RX ORDER — HYDROMORPHONE HYDROCHLORIDE 1 MG/ML
0.5 INJECTION, SOLUTION INTRAMUSCULAR; INTRAVENOUS; SUBCUTANEOUS EVERY 5 MIN PRN
Status: CANCELLED | OUTPATIENT
Start: 2023-08-30

## 2023-08-30 RX ADMIN — PROPOFOL 50 MG: 10 INJECTION, EMULSION INTRAVENOUS at 11:01

## 2023-08-30 RX ADMIN — PROPOFOL 50 MG: 10 INJECTION, EMULSION INTRAVENOUS at 10:59

## 2023-08-30 RX ADMIN — SODIUM CHLORIDE, POTASSIUM CHLORIDE, SODIUM LACTATE AND CALCIUM CHLORIDE: 600; 310; 30; 20 INJECTION, SOLUTION INTRAVENOUS at 10:53

## 2023-08-30 RX ADMIN — PROPOFOL 100 MG: 10 INJECTION, EMULSION INTRAVENOUS at 10:57

## 2023-08-30 RX ADMIN — SODIUM CHLORIDE, POTASSIUM CHLORIDE, SODIUM LACTATE AND CALCIUM CHLORIDE: 600; 310; 30; 20 INJECTION, SOLUTION INTRAVENOUS at 09:48

## 2023-08-30 RX ADMIN — PROPOFOL 50 MG: 10 INJECTION, EMULSION INTRAVENOUS at 11:07

## 2023-08-30 RX ADMIN — PROPOFOL 50 MG: 10 INJECTION, EMULSION INTRAVENOUS at 11:03

## 2023-08-30 RX ADMIN — LIDOCAINE HYDROCHLORIDE 60 MG: 20 INJECTION, SOLUTION INFILTRATION; PERINEURAL at 10:57

## 2023-08-30 ASSESSMENT — PAIN - FUNCTIONAL ASSESSMENT: PAIN_FUNCTIONAL_ASSESSMENT: 0-10

## 2023-08-30 NOTE — H&P
History and Physical Service   [facility] - INPATIENT    HISTORY AND PHYSICAL EXAMINATION            Date of Evaluation: 8/30/2023  Patient name:  Harriet Gtz  MRN:   [id number]  YOB: 1980  PCP:    ROSELINE Muse CNP    History Obtained From:     Patient, medical records     History of Present Illness: This is Harriet Gtz a 43 y.o. female who presents today for a EGD, FINNEY PLACEMENT by Carolyn Smith DO for Gastroesophageal reflux disease, unspecified whether esophagitis present. Patient with a history of longstanding GERD and functional dyspepsia symptoms currently on Pantoprazole and Carafate. Patient reports reflux symptoms with constant burning sensation that keeps her up all night despite taking PPI and Carafate. She also reports raspy voice over the past few months, but she is unsure if it is related to her job duties as a  or reflux symptoms. She has been also taking Tums as needed to control symptoms. Last dose of PPI and Carafate 1 week ago, but still taking Tums. Last dose of Tums- last evening. Denies history of N/V, dysphagia or odynophagia, known peptic ulcers, hiatal hernia, or unintentional weight loss. Patient denies bloody tarry stools, diarrhea, constipation, fever, chills, abdominal pain or unexplained weight loss. Denies CP, shortness or breath, fever or chills.      History of prior EGD 12/2014, EGD with Bravo 9/2017    Past Medical History:     Past Medical History:   Diagnosis Date    Acne     Allergic rhinitis     Chickenpox     Depression     Esophageal reflux     Functional dyspepsia     Headache(784.0)     HPV (human papilloma virus) anogenital infection     Hyperlipidemia     Syncope     Unspecified constipation     Unspecified sleep apnea         Past Surgical History:     Past Surgical History:   Procedure Laterality Date    BREAST SURGERY Left     cyst removed    COLONOSCOPY N/A 05/14/2014

## 2023-08-30 NOTE — DISCHARGE INSTRUCTIONS
POST-ENDOSCOPY INSTRUCTIONS:    1. ACTIVITY   No driving, operating machinery, or making important decisions for 24 hours. Resume normal activity after 24 hours. You may return to work after 24 hours. 2. DIET     __x__ (EGD/ERCP):  Do not eat or drink for one hour after your exam.  You may then   try a sip of water, and if you are able to swallow as usual you may advance to a   regular diet. ____ (Colonscopy/Flex Sig):  Resume your usual diet unless specified below. ____ Diet Modification:    3. MEDICATIONS (Do not consume alcohol, tranquilizers, or sleeping medications for 24           hours unless advised by your physician)       ___x__ Resume your usual medications. Remain off all acid suppression for the duration of pH study    4. PHYSICIAN FOLLOW-UP        ___x_ Please call the office for an appointment/further instructions. ____ See your family physician. 5. ADDITIONAL INSTRUCTIONS          6. NORMAL CHANGES YOU MAY EXPERIENCE AFTER ENDOSCOPY:          EGD/ERCP     COLONOSCOPY      Sore throat after EGD/ERCP   Passing of gas for several hours after      A bloated feeling and belching from  Some mild abdominal cramping   air in stomach    If a biopsy/polypectomy was done, you      If a biopsy was done, you may spit   may see some spotting of blood   up some blood tinged mucous  You may feel fatigued for the next 24-48         hours due to the prep and sedation    7. CALL YOUR PHYSICIAN IF YOU EXPERIENCE ANY OF THE FOLLOWING:      A.  Passing blood rectally or vomiting blood (color may be red or black)      B. Severe abdominal pain or tenderness (that is not relieved by passing air)      C. Fever, chills, or excessive sweating      D.  Persistent nausea or vomiting      E.  Redness or swelling at the IV site          -- Bravo device is programmed for 96 hours (4 days)   -- Please return device to 2nd floor registration desk at [facility]. [facility] between the hours of 8a-5p.   Can be

## 2023-08-30 NOTE — OP NOTE
Patient: Tyesha Boo  YOB: 1980  MRN: [id number]    Date of Procedure: 8/30/2023    Pre-Op Diagnosis Codes:     * Gastroesophageal reflux disease, unspecified whether esophagitis present [K21.9]    Post-Op Diagnosis: Hiatal hernia. Schatzki's ring       Procedure(s):  EGD, BRAVO PLACEMENT off PPI therapy    Surgeon(s):  Ketty Louise DO    Assistant:   * No surgical staff found *    Anesthesia: Monitor Anesthesia Care    Estimated Blood Loss (mL): None    Complications: None    Specimens:   * No specimens in log *    Implants:  * No implants in log *      Drains: * No LDAs found *    Findings: See below    Detailed Description of Procedure:   Description of Procedure:  Informed consent was obtained from the patient after explanation of the procedure including indications, description of the procedure,  benefits and possible risks and complications of the procedure, and alternatives. Questions were answered. The patient's history was reviewed and a directed physical examination was performed prior to the procedure. Patient was monitored throughout the procedure with pulse oximetry and periodic assessment of vital signs. Patient was sedated as noted above. With the patient in the left lateral decubitus position, the Olympus videoendoscope was placed in the patient's mouth and under direct visualization passed into the esophagus. Visualization of the esophagus, stomach, and duodenum was performed during both introduction and withdrawal of the endoscope and retroflexed view of the proximal stomach was obtained. The scope was passed to the 2nd portion of the duodenum. The patient tolerated the procedure well and was taken to the recovery area in good condition. Findings[de-identified]   Esophagus: Diaphragmatic hiatus 38 cm. GE junction and squamocolumnar junction 36 cm resulting in a 2 cm hiatal hernia. At 36 cm there was a widely patent nonobstructing Schatzki's ring.   Bravo capsule placed at 30 cm, confirmed thereafter. .   Stomach: Normal  Duodenum: Normal    Recommendations: -Continue to hold acid suppression for the duration of the 48-hour study. We will review tracing when available.     Electronically signed by Mariana Ring DO on 8/30/2023 at 11:08 AM

## 2023-08-30 NOTE — ANESTHESIA PRE PROCEDURE
Anesthesia Plan      MAC     ASA 3       Induction: intravenous. Anesthetic plan and risks discussed with patient.                     Urine pregnancy NEGATIVE 8/30/2023    Amador Odonnell MD   8/30/2023

## 2023-11-13 ENCOUNTER — OFFICE VISIT (OUTPATIENT)
Dept: BEHAVIORAL/MENTAL HEALTH CLINIC | Age: 43
End: 2023-11-13
Payer: COMMERCIAL

## 2023-11-13 DIAGNOSIS — F43.23 ADJUSTMENT DISORDER WITH MIXED ANXIETY AND DEPRESSED MOOD: Primary | ICD-10-CM

## 2023-11-13 PROCEDURE — 90791 PSYCH DIAGNOSTIC EVALUATION: CPT | Performed by: COUNSELOR

## 2023-11-13 NOTE — PROGRESS NOTES
ADULT BEHAVIORAL HEALTH ASSESSMENT  Jessica Hector       Visit Date: 11/13/2023   Time of appointment:  1:02p   Time spent with Patient: 48 minutes. This is patient's first appointment. Reason for Consult:  New Patient     Referring Provider/PCP:    No ref. provider found  ROSELINE Hightower - CNP      Pt provided informed consent for the behavioral health program. Discussed with patient model of service to include the limits of confidentiality (i.e. abuse reporting, suicide intervention, etc.) and short-term intervention focused approach. Pt indicated understanding. PRESENTING PROBLEM AND HISTORY  Irma Camacho is a 43 y.o. female who presents for new evaluation and treatment of  anxiety, depression. She has the following symptoms: depressed mood, decreased sleep, fatigue/lack of energy, lack of motivation, excessive guilt, low self-esteem, isolating self, feelings of worthlessness, feeling unable to make decisions, anger/irritability, excessive talking/pressure to keep talking, feeling nervous, anxious, or on edge, racing worry thoughts, excessive anxiety and worry about specific stressors, inability to stop or control worry, unexpected panic attacks, feeling afraid something awful might happen, feeling numb or detached, feeling fidgety or restless, difficulty with attention/concentration, and reported feeling guilty when does things for herself . Onset of symptoms was approximately several months ago. Symptoms have been fluctuating since that time. She denies current suicidal and homicidal ideation. Family history significant for depression. Risk factors: positive family history in  mother and negative life event dad passing away earlier in the year . Previous treatment includes  luvox . She complains of the following medication side effects: none. She reported panic attacks are newer since dad's passing and last one was a couple weeks ago.  She identified difficulty focusing on her tasks at work

## 2023-11-30 NOTE — PROGRESS NOTES
N.gonorrhoeae DNA    Anaerobic And Aerobic Culture    Genital Culture   4. Pre-procedure lab exam  hCG, Quantitative, Pregnancy        Patient Active Problem List    Diagnosis Date Noted    S/P LEEP 7/5/18 with IUD removal 07/05/2018    Dysplasia of cervix, high grade CAMERON 2 06/10/2018     Recommend LEEP      Vulvar intraepithelial neoplasia (NELIDA) grade 1 06/10/2018     Repeat colposcopy in 12 months.  Lichen sclerosus 23/51/8670    Vasovagal syncope 02/13/2018     Overview:   last episode 2016, negative cardiac workup, occurs with bowel movements      High risk HPV infection 09/19/2017    Hyperlipidemia 08/01/2017    Mild episode of recurrent major depressive disorder ([facility] [address] 11/28/2016    Anxiety 11/28/2016    Constipation 01/18/2016    Migraine without aura      Onset 2010. MRI brain 5/2014 normal.    Treated with TOPAMAX. Abortive agent FIORICET and PHENERGAN. Also advised about Folic acid supplement and Estrogen use in migraine. ANAPROX added for perimenstrual migraine 2/2015.  Allergic rhinitis 01/03/2012    GERD (gastroesophageal reflux disease) 01/03/2012          POD# 4 weeks   Procedure: LEEP   Stable   Pathology reviewed and found to be benign. Yes    Plan:   Return in about 4 weeks (around 8/27/2018) for IUD placement. Restrictions lifted  Diflucan and Lidocaine jelly ordered  Recommend padding dry with moist toilettes as to decrease trauma to urethral meatus.
No

## 2023-12-07 ENCOUNTER — OFFICE VISIT (OUTPATIENT)
Dept: BEHAVIORAL/MENTAL HEALTH CLINIC | Age: 43
End: 2023-12-07
Payer: COMMERCIAL

## 2023-12-07 DIAGNOSIS — F43.23 ADJUSTMENT DISORDER WITH MIXED ANXIETY AND DEPRESSED MOOD: Primary | ICD-10-CM

## 2023-12-07 PROCEDURE — 90834 PSYTX W PT 45 MINUTES: CPT | Performed by: COUNSELOR

## 2023-12-07 NOTE — PROGRESS NOTES
ADULT BEHAVIORAL HEALTH FOLLOW UP  Jim Watson       Visit Date: 12/7/2023   Time of appointment:  11:08a   Time spent with Patient: 45 minutes. This is patient's second appointment. Reason for Consult: Follow-up     Referring Provider/PCP:    No ref. provider found  ROSELINE Bragg - CNP      Pt provided informed consent for the behavioral health program. Discussed with patient model of service to include the limits of confidentiality (i.e. abuse reporting, suicide intervention, etc.) and short-term intervention focused approach. Pt indicated understanding. Dinesh Caballero is a 43 y.o. female who presents for follow up of anxiety and depression. Thanksgiving was processed and she identified it was very \"steffany\" and processed how this went. Therapist informed her that a new therapist is now at the [place] office and discussed if she is interested in going back to that location due to distance as it is an option. She reported she would think about this. Family relationships were processed. Grief/loss was continued to be processed and how she is managing allowing herself to process emotions. Work stressors were processed as she identified having a review with her boss and this not going well. She processed this review with therapist and therapist utilized MI to assess insight into potential change behaviors. Therapist provided psychoeducation on how to use healthier communication skills and how to work on perspective taking. Therapist practiced with clt how to use these skills. She identified this could be helpful. She presented tearful processing grief/loss and barriers to allowing herself to process emotions. Therapist provided psychoeducation on burn out and importance of self-care. Therapist worked with her on identifying ways she could work on grief/loss while also allowing self-care time.  Therapist encouraged her to try to get back into journaling as she identified this used to be

## 2023-12-14 ENCOUNTER — HOSPITAL ENCOUNTER (OUTPATIENT)
Age: 43
Setting detail: SPECIMEN
Discharge: HOME | End: 2023-12-14

## 2023-12-14 DIAGNOSIS — F43.23 ADJUSTMENT DISORDER WITH MIXED ANXIETY AND DEPRESSED MOOD: ICD-10-CM

## 2023-12-14 DIAGNOSIS — E55.9 VITAMIN D DEFICIENCY: ICD-10-CM

## 2023-12-14 DIAGNOSIS — R53.82 CHRONIC FATIGUE: ICD-10-CM

## 2023-12-14 LAB
BASOPHILS # BLD: 0.1 K/UL (ref 0–0.2)
BASOPHILS NFR BLD: 1 % (ref 0–2)
EOSINOPHIL # BLD: 0.43 K/UL (ref 0–0.44)
EOSINOPHILS RELATIVE PERCENT: 5 % (ref 1–4)
ERYTHROCYTE [DISTWIDTH] IN BLOOD BY AUTOMATED COUNT: 14.4 % (ref 11.8–14.4)
HCT VFR BLD AUTO: 40.8 % (ref 36.3–47.1)
HGB BLD-MCNC: 12.5 G/DL (ref 11.9–15.1)
IMM GRANULOCYTES # BLD AUTO: <0.03 K/UL (ref 0–0.3)
IMM GRANULOCYTES NFR BLD: 0 %
LYMPHOCYTES NFR BLD: 2.82 K/UL (ref 1.1–3.7)
LYMPHOCYTES RELATIVE PERCENT: 35 % (ref 24–43)
MCH RBC QN AUTO: 27.8 PG (ref 25.2–33.5)
MCHC RBC AUTO-ENTMCNC: 30.6 G/DL (ref 28.4–34.8)
MCV RBC AUTO: 90.7 FL (ref 82.6–102.9)
MONOCYTES NFR BLD: 0.65 K/UL (ref 0.1–1.2)
MONOCYTES NFR BLD: 8 % (ref 3–12)
NEUTROPHILS NFR BLD: 51 % (ref 36–65)
NEUTS SEG NFR BLD: 4.01 K/UL (ref 1.5–8.1)
NRBC BLD-RTO: 0 PER 100 WBC
PLATELET # BLD AUTO: 422 K/UL (ref 138–453)
PMV BLD AUTO: 9.3 FL (ref 8.1–13.5)
RBC # BLD AUTO: 4.5 M/UL (ref 3.95–5.11)
WBC OTHER # BLD: 8 K/UL (ref 3.5–11.3)

## 2023-12-15 LAB
25(OH)D3 SERPL-MCNC: 32.2 NG/ML
ALBUMIN SERPL-MCNC: 4.2 G/DL (ref 3.5–5.2)
ALBUMIN/GLOB SERPL: 1.4 {RATIO} (ref 1–2.5)
ALP SERPL-CCNC: 67 U/L (ref 35–104)
ALT SERPL-CCNC: 19 U/L (ref 5–33)
ANION GAP SERPL CALCULATED.3IONS-SCNC: 12 MMOL/L (ref 9–17)
AST SERPL-CCNC: 19 U/L
BILIRUB SERPL-MCNC: 0.2 MG/DL (ref 0.3–1.2)
BUN SERPL-MCNC: 17 MG/DL (ref 6–20)
CALCIUM SERPL-MCNC: 9.5 MG/DL (ref 8.6–10.4)
CHLORIDE SERPL-SCNC: 106 MMOL/L (ref 98–107)
CO2 SERPL-SCNC: 21 MMOL/L (ref 20–31)
CREAT SERPL-MCNC: 0.6 MG/DL (ref 0.5–0.9)
GFR SERPL CREATININE-BSD FRML MDRD: >60 ML/MIN/1.73M2
GLUCOSE P FAST SERPL-MCNC: 70 MG/DL (ref 70–99)
POTASSIUM SERPL-SCNC: 4.7 MMOL/L (ref 3.7–5.3)
PROT SERPL-MCNC: 7.3 G/DL (ref 6.4–8.3)
SODIUM SERPL-SCNC: 139 MMOL/L (ref 135–144)

## 2024-01-04 ENCOUNTER — OFFICE VISIT (OUTPATIENT)
Dept: BEHAVIORAL/MENTAL HEALTH CLINIC | Age: 44
End: 2024-01-04
Payer: COMMERCIAL

## 2024-01-04 DIAGNOSIS — F43.23 ADJUSTMENT DISORDER WITH MIXED ANXIETY AND DEPRESSED MOOD: Primary | ICD-10-CM

## 2024-01-04 PROCEDURE — 90834 PSYTX W PT 45 MINUTES: CPT | Performed by: COUNSELOR

## 2024-01-04 NOTE — PROGRESS NOTES
STATUS EXAM  Mood was within normal limits with calm affect.   Suicidal ideation was denied.   Homicidal ideation was denied.   Hygiene was fair .  Dress was appropriate.   Behavior was Within Normal Limits with No observation or self-report of difficulties ambulating.   Attitude was Cooperative.  Eye-contact was fair.  Speech: rate - WNL, rhythm - WNL, volume - WNL.  Verbalizations were goal directed.  Thought processes were intact and goal-oriented without evidence of delusions, hallucinations, obsessions, or jose; without no cognitive distortions.   Associations were characterized by intact cognitive processes.  Pt was oriented to person, place, time, and general circumstances;  recent:  fair.  Insight and judgment were estimated to be fair, AEB, a fair  understanding of cyclical maladaptive patterns, and the ability to use insight to inform behavior change.   Attention span and concentration appear within functional limits  Language use and knowledge base appear within functional limits        ASSESSMENT  Roxann presented to the appointment today for evaluation and treatment of symptoms of anxiety and depression.  She is currently deemed no risk to herself or others and meets criteria for adjustment disorder with mixed anxiety and depressed mood. Roxann was in agreement with recommendations.          4/6/2023    11:45 AM 2/24/2022    12:42 PM 1/17/2018     9:57 AM   PHQ Scores   PHQ2 Score 2 3 2   PHQ9 Score 18 17 2     Interpretation of Total Score Depression Severity: 1-4 = Minimal depression, 5-9 = Mild depression, 10-14 = Moderate depression, 15-19 = Moderately severe depression, 20-27 = Severe depression    How often pt has had thoughts of death or hurting self (if PHQ positive for depression):           4/6/2023    11:00 AM 2/24/2022    12:00 PM   HONEY 7 SCORE   HONEY-7 Total Score 15 16     Interpretation of HONEY-7 score: 5-9 = mild anxiety, 10-14 = moderate anxiety, 15+ = severe anxiety. Recommend

## 2024-01-16 ENCOUNTER — OFFICE VISIT (OUTPATIENT)
Dept: FAMILY MEDICINE CLINIC | Age: 44
End: 2024-01-16
Payer: COMMERCIAL

## 2024-01-16 VITALS
OXYGEN SATURATION: 99 % | HEART RATE: 75 BPM | BODY MASS INDEX: 29.47 KG/M2 | WEIGHT: 199 LBS | TEMPERATURE: 97.7 F | HEIGHT: 69 IN | DIASTOLIC BLOOD PRESSURE: 72 MMHG | SYSTOLIC BLOOD PRESSURE: 122 MMHG

## 2024-01-16 DIAGNOSIS — B02.8 HERPES ZOSTER WITH COMPLICATION: Primary | ICD-10-CM

## 2024-01-16 PROCEDURE — 99213 OFFICE O/P EST LOW 20 MIN: CPT | Performed by: REGISTERED NURSE

## 2024-01-16 RX ORDER — ACYCLOVIR 400 MG/1
400 TABLET ORAL
Qty: 50 TABLET | Refills: 0 | Status: SHIPPED | OUTPATIENT
Start: 2024-01-16 | End: 2024-01-26

## 2024-01-16 ASSESSMENT — ENCOUNTER SYMPTOMS
PHOTOPHOBIA: 0
SINUS PAIN: 0
COUGH: 0
EYE ITCHING: 1
CONSTIPATION: 0
EYE PAIN: 1
DIARRHEA: 0
NAUSEA: 0
ABDOMINAL PAIN: 0
EYE REDNESS: 1
EYE DISCHARGE: 0
SINUS PRESSURE: 0
VOMITING: 0

## 2024-01-16 ASSESSMENT — VISUAL ACUITY: OU: 1

## 2024-01-16 NOTE — PROGRESS NOTES
5    gabapentin (NEURONTIN) 100 MG capsule TAKE 1 CAPSULE BY MOUTH AT BEDTIME DURING SEVERE HEADACHE      CLINDAMYCIN PHOSPHATE,TOPICAL, 1 % SWAB       fluticasone (FLONASE) 50 MCG/ACT nasal spray 1 spray by Nasal route every morning (before breakfast) 1 each 0    ketoconazole (NIZORAL) 2 % shampoo       tretinoin (RETIN-A) 0.025 % cream       clobetasol prop emollient base 0.05 % CREA Apply topically daily      guaiFENesin (MUCINEX) 600 MG extended release tablet Take 1 tablet by mouth every 12 hours as needed      levonorgestrel (MIRENA) IUD 52 mg 1 each by IntraUTERine route      topiramate (TOPAMAX) 50 MG tablet   3    SUMAtriptan (IMITREX) 50 MG tablet Take 1 tablet by mouth once as needed for Migraine      fexofenadine (ALLEGRA) 180 MG tablet Take 1 tablet by mouth daily      cetirizine (ZYRTEC ALLERGY) 10 MG tablet Take 1 tablet by mouth daily (Patient not taking: Reported on 8/30/2023) 30 tablet 0    sucralfate (CARAFATE) 1 GM tablet  (Patient not taking: Reported on 1/16/2024)      ciclopirox (LOPROX) 0.77 % cream as needed (Patient not taking: Reported on 1/16/2024)      EPINEPHrine (EPIPEN) 0.3 MG/0.3ML SOAJ injection Inject 0.3 mLs into the muscle as needed (exercise induce anaphlaxis) 1 each 1    pantoprazole (PROTONIX) 40 MG tablet Take 1 tablet by mouth 2 times daily (Patient not taking: Reported on 1/16/2024)       No current facility-administered medications for this visit.       ALLERGIES:      Allergies   Allergen Reactions    Other Hives     Advil Sinus Congestion & Pain    Gold-Containing Drug Products Other (See Comments)     welts    Ibuprofen Hives    Flagyl [Metronidazole]     Formaldehyde     Gold Sodium Thiomalate     Pollen Extract      Seasonal allergies       RESULTS:      No results found for this visit on 01/16/24.   Hospital Outpatient Visit on 12/14/2023   Component Date Value Ref Range Status    WBC 12/14/2023 8.0  3.5 - 11.3 k/uL Final    RBC 12/14/2023 4.50  3.95 - 5.11 m/uL

## 2024-01-22 ENCOUNTER — TELEPHONE (OUTPATIENT)
Dept: FAMILY MEDICINE CLINIC | Age: 44
End: 2024-01-22

## 2024-01-22 NOTE — TELEPHONE ENCOUNTER
Thank you for calling [facility] this is Lilly, how may I help you    Who is calling? Roxann zimmer  (Date of Birth  1980)   Phone number for return call:  [phone]  Reason for call:  patient saw soren clemons On 1/16/24 and was DX with Shingles and is talking benadryl for itching but is not working   What have you tried?  benadryl   What has worked? nothing  Verify Pharmacy? [facility]  Were they seen in the last 2 weeks? yes  Where is the pain? face  Is it a new problem? yes  How long has it been going on? 2 weeks  Have you seen anyone here for this before? yes

## 2024-01-23 NOTE — TELEPHONE ENCOUNTER
Patient made appointment with PCP, she still has itching and face is still red but don't hurt. Patient has made appointment with eye dr and stated swelling around eyes is down.

## 2024-01-24 ENCOUNTER — OFFICE VISIT (OUTPATIENT)
Dept: FAMILY MEDICINE CLINIC | Age: 44
End: 2024-01-24
Payer: COMMERCIAL

## 2024-01-24 VITALS
DIASTOLIC BLOOD PRESSURE: 72 MMHG | TEMPERATURE: 99.1 F | OXYGEN SATURATION: 99 % | SYSTOLIC BLOOD PRESSURE: 108 MMHG | WEIGHT: 196 LBS | HEART RATE: 96 BPM | BODY MASS INDEX: 28.94 KG/M2

## 2024-01-24 DIAGNOSIS — B02.29 POST HERPETIC NEURALGIA: Primary | ICD-10-CM

## 2024-01-24 PROCEDURE — 99213 OFFICE O/P EST LOW 20 MIN: CPT | Performed by: REGISTERED NURSE

## 2024-01-24 RX ORDER — GABAPENTIN 100 MG/1
100 CAPSULE ORAL 2 TIMES DAILY
Qty: 30 CAPSULE | Refills: 0 | Status: SHIPPED | OUTPATIENT
Start: 2024-01-24 | End: 2024-02-08

## 2024-01-24 RX ORDER — PREDNISONE 10 MG/1
TABLET ORAL
Qty: 30 TABLET | Refills: 0 | Status: SHIPPED | OUTPATIENT
Start: 2024-01-24

## 2024-01-24 RX ORDER — ERYTHROMYCIN 5 MG/G
OINTMENT OPHTHALMIC
COMMUNITY
Start: 2024-01-23

## 2024-01-24 ASSESSMENT — ENCOUNTER SYMPTOMS
EYE ITCHING: 1
EYE REDNESS: 1
EYE DISCHARGE: 1

## 2024-01-24 NOTE — PROGRESS NOTES
Final    MPV 12/14/2023 9.3  8.1 - 13.5 fL Final    NRBC Automated 12/14/2023 0.0  0.0 per 100 WBC Final    Neutrophils % 12/14/2023 51  36 - 65 % Final    Lymphocytes % 12/14/2023 35  24 - 43 % Final    Monocytes % 12/14/2023 8  3 - 12 % Final    Eosinophils % 12/14/2023 5 (H)  1 - 4 % Final    Basophils % 12/14/2023 1  0 - 2 % Final    Immature Granulocytes 12/14/2023 0  0 % Final    Neutrophils Absolute 12/14/2023 4.01  1.50 - 8.10 k/uL Final    Lymphocytes Absolute 12/14/2023 2.82  1.10 - 3.70 k/uL Final    Monocytes Absolute 12/14/2023 0.65  0.10 - 1.20 k/uL Final    Eosinophils Absolute 12/14/2023 0.43  0.00 - 0.44 k/uL Final    Basophils Absolute 12/14/2023 0.10  0.00 - 0.20 k/uL Final    Absolute Immature Granulocyte 12/14/2023 <0.03  0.00 - 0.30 k/uL Final    Vit D, 25-Hydroxy 12/14/2023 32.2  >29.9 ng/mL Final    Comment:    Reference Range:  Vitamin D status         Range   Deficiency              <20 ng/mL   Mild Deficiency       20-30 ng/mL   Sufficiency           ng/mL   Toxicity               >100 ng/mL      Sodium 12/14/2023 139  135 - 144 mmol/L Final    Potassium 12/14/2023 4.7  3.7 - 5.3 mmol/L Final    Chloride 12/14/2023 106  98 - 107 mmol/L Final    CO2 12/14/2023 21  20 - 31 mmol/L Final    Anion Gap 12/14/2023 12  9 - 17 mmol/L Final    Glucose, Fasting 12/14/2023 70  70 - 99 mg/dL Final    BUN 12/14/2023 17  6 - 20 mg/dL Final    Creatinine 12/14/2023 0.6  0.5 - 0.9 mg/dL Final    Est, Glom Filt Rate 12/14/2023 >60  >60 mL/min/1.73m2 Final    Comment:       These results are not intended for use in patients <18 years of age.        eGFR results are calculated without a race factor using the 2021 CKD-EPI equation.  Careful clinical correlation is recommended, particularly when comparing to results   calculated using previous equations.  The CKD-EPI equation is less accurate in patients with extremes of muscle mass, extra-renal   metabolism of creatine, excessive creatine ingestion, or

## 2024-01-25 ENCOUNTER — OFFICE VISIT (OUTPATIENT)
Dept: BEHAVIORAL/MENTAL HEALTH CLINIC | Age: 44
End: 2024-01-25
Payer: COMMERCIAL

## 2024-01-25 DIAGNOSIS — F43.23 ADJUSTMENT DISORDER WITH MIXED ANXIETY AND DEPRESSED MOOD: Primary | ICD-10-CM

## 2024-01-25 PROCEDURE — 90834 PSYTX W PT 45 MINUTES: CPT | Performed by: COUNSELOR

## 2024-01-25 NOTE — PROGRESS NOTES
thinking. Encouraged to work on boundaries.             MENTAL STATUS EXAM  Mood was within normal limits with calm affect.   Suicidal ideation was denied.   Homicidal ideation was denied.   Hygiene was fair .  Dress was appropriate.   Behavior was Within Normal Limits with No observation or self-report of difficulties ambulating.   Attitude was Cooperative.  Eye-contact was fair.  Speech: rate - WNL, rhythm - WNL, volume - WNL.  Verbalizations were goal directed.  Thought processes were intact and goal-oriented without evidence of delusions, hallucinations, obsessions, or jose; without no cognitive distortions.   Associations were characterized by intact cognitive processes.  Pt was oriented to person, place, time, and general circumstances;  recent:  fair.  Insight and judgment were estimated to be fair, AEB, a fair  understanding of cyclical maladaptive patterns, and the ability to use insight to inform behavior change.   Attention span and concentration appear within functional limits  Language use and knowledge base appear within functional limits        ASSESSMENT  Roxann presented to the appointment today for evaluation and treatment of symptoms of anxiety and depression.  She is currently deemed no risk to herself or others and meets criteria for adjustment disorder with mixed anxiety and depression. Roxann was in agreement with recommendations.          4/6/2023    11:45 AM 2/24/2022    12:42 PM 1/17/2018     9:57 AM   PHQ Scores   PHQ2 Score 2 3 2   PHQ9 Score 18 17 2     Interpretation of Total Score Depression Severity: 1-4 = Minimal depression, 5-9 = Mild depression, 10-14 = Moderate depression, 15-19 = Moderately severe depression, 20-27 = Severe depression    How often pt has had thoughts of death or hurting self (if PHQ positive for depression):           4/6/2023    11:00 AM 2/24/2022    12:00 PM   HONEY 7 SCORE   HONEY-7 Total Score 15 16     Interpretation of HONEY-7 score: 5-9 = mild anxiety, 10-14

## 2024-01-29 ENCOUNTER — OFFICE VISIT (OUTPATIENT)
Dept: FAMILY MEDICINE CLINIC | Age: 44
End: 2024-01-29
Payer: COMMERCIAL

## 2024-01-29 VITALS
SYSTOLIC BLOOD PRESSURE: 112 MMHG | WEIGHT: 196 LBS | OXYGEN SATURATION: 100 % | DIASTOLIC BLOOD PRESSURE: 78 MMHG | BODY MASS INDEX: 29.03 KG/M2 | HEART RATE: 98 BPM | HEIGHT: 69 IN | TEMPERATURE: 98.1 F

## 2024-01-29 DIAGNOSIS — L30.9 PERIORBITAL DERMATITIS: Primary | ICD-10-CM

## 2024-01-29 PROCEDURE — 99214 OFFICE O/P EST MOD 30 MIN: CPT

## 2024-01-29 ASSESSMENT — PATIENT HEALTH QUESTIONNAIRE - PHQ9
5. POOR APPETITE OR OVEREATING: 0
7. TROUBLE CONCENTRATING ON THINGS, SUCH AS READING THE NEWSPAPER OR WATCHING TELEVISION: 0
SUM OF ALL RESPONSES TO PHQ QUESTIONS 1-9: 0
8. MOVING OR SPEAKING SO SLOWLY THAT OTHER PEOPLE COULD HAVE NOTICED. OR THE OPPOSITE, BEING SO FIGETY OR RESTLESS THAT YOU HAVE BEEN MOVING AROUND A LOT MORE THAN USUAL: 0
4. FEELING TIRED OR HAVING LITTLE ENERGY: 0
2. FEELING DOWN, DEPRESSED OR HOPELESS: 0
9. THOUGHTS THAT YOU WOULD BE BETTER OFF DEAD, OR OF HURTING YOURSELF: 0
3. TROUBLE FALLING OR STAYING ASLEEP: 0
SUM OF ALL RESPONSES TO PHQ QUESTIONS 1-9: 0
SUM OF ALL RESPONSES TO PHQ9 QUESTIONS 1 & 2: 0
SUM OF ALL RESPONSES TO PHQ QUESTIONS 1-9: 0
6. FEELING BAD ABOUT YOURSELF - OR THAT YOU ARE A FAILURE OR HAVE LET YOURSELF OR YOUR FAMILY DOWN: 0
SUM OF ALL RESPONSES TO PHQ QUESTIONS 1-9: 0
10. IF YOU CHECKED OFF ANY PROBLEMS, HOW DIFFICULT HAVE THESE PROBLEMS MADE IT FOR YOU TO DO YOUR WORK, TAKE CARE OF THINGS AT HOME, OR GET ALONG WITH OTHER PEOPLE: 0
1. LITTLE INTEREST OR PLEASURE IN DOING THINGS: 0

## 2024-01-29 ASSESSMENT — ENCOUNTER SYMPTOMS
DIARRHEA: 0
CONSTIPATION: 0
EYE DISCHARGE: 0
VOMITING: 0
SORE THROAT: 0
NAUSEA: 0
SHORTNESS OF BREATH: 0
COUGH: 0

## 2024-01-29 NOTE — PROGRESS NOTES
Roxann Diggs (:  1980) is a 43 y.o. female,Established patient, here for evaluation of the following chief complaint(s):  Herpes Zoster (Shingles f/u, patient states her swelling has gone down but she is still a little itchy sometimes, she was given eye ointment from her eye doctor)         ASSESSMENT/PLAN:  1. Periorbital dermatitis  -     betamethasone valerate (VALISONE) 0.1 % cream; Apply topically 3 times daily as needed, Disp-15 g, R-0, Normal  -     mupirocin (BACTROBAN) 2 % ointment; Apply topically 3 times daily as needed, Disp-22 g, R-0, Normal    Do not feel patient had shingles  Far more consistent with a periorbital dermatitis/ cellulitis after picture review   Follow up with ophthalmology   Advised to use topical eczema recipe as given in office   If any increased redness or swelling, advised patient to send photo- we will treat with oral ABX at that time.     Return if symptoms worsen or fail to improve.         Subjective   SUBJECTIVE/OBJECTIVE:  Patient presents today for follow up of right eye suspected shingles.  She was treated by NERI Oden CNP in office with antiviral.  Patient returned for increased redness and itching.  She has seen ophthalmology who started her on erythromycin ointment which she has been doing 4 times daily.  She shows me pictures today.  She had bilateral dermatitis.  Increased dryness and intching.  She also had periorbital swelling.  Started in right eye, then progressed to left eye.  Never had conjunctivitis.  No fevers.  Never had any lesions erupt.     She has completed all the antiviral.  Remains on Gabapentin for the neuralgia.  She has been off of work. Has follow up with eye doctor in a couple of days.         Review of Systems   Constitutional:  Negative for activity change, fatigue and fever.   HENT:  Negative for dental problem and sore throat.    Eyes:  Negative for discharge and visual disturbance.   Respiratory:  Negative for cough and

## 2024-02-08 ENCOUNTER — OFFICE VISIT (OUTPATIENT)
Dept: BEHAVIORAL/MENTAL HEALTH CLINIC | Age: 44
End: 2024-02-08
Payer: COMMERCIAL

## 2024-02-08 DIAGNOSIS — F43.23 ADJUSTMENT DISORDER WITH MIXED ANXIETY AND DEPRESSED MOOD: Primary | ICD-10-CM

## 2024-02-08 PROCEDURE — 90832 PSYTX W PT 30 MINUTES: CPT | Performed by: COUNSELOR

## 2024-02-08 NOTE — PROGRESS NOTES
ADULT BEHAVIORAL HEALTH FOLLOW UP  Jessica Lomeli       Visit Date: 2/8/2024   Time of appointment:  11:29a   Time spent with Patient: 32 minutes.   This is patient's fifth appointment.    Reason for Consult:  Follow-up     Referring Provider/PCP:    No ref. provider found  Oxana Nance, APRN - CNP      Pt provided informed consent for the behavioral health program. Discussed with patient model of service to include the limits of confidentiality (i.e. abuse reporting, suicide intervention, etc.) and short-term intervention focused approach.  Pt indicated understanding.    BUSTER Hackett is a 43 y.o. female who presents for follow up of anxiety and depression. Session started late due to previous session running over. She reported she is still working on her physical health concerns with her eye. Therapist provided active listening and validated clt's feelings. She reported her PCP does not believe she has Shingles and began processing anxiety and frustrations around trying to figure out what caused this irritation. She reported she is having to go to [place] this weekend to visit her sister. She reported she has a [place] event with a friend this weekend and is looking forward to this. She reported she is back at work and processed how this has been with one of the staff being on vacation. Anxious sx and negative self-talk were processed and how she is coping. Therapist provided psychoeducation on how to increase self-esteem and confront maladaptive thoughts. Therapist confronted maladaptive thinking with clt and she was able to start increasing positive self-talk. Therapist worked with her on identifying strengths. Therapist provided her with self-esteem worksheet and encouraged her to work on this. Relationships were processed and how she is managing this. Avoidance behaviors were processed. Therapist encouraged her to incorporate skills identified and reflect on what she wants for her goals.

## 2024-02-20 ENCOUNTER — OFFICE VISIT (OUTPATIENT)
Dept: PRIMARY CARE CLINIC | Age: 44
End: 2024-02-20
Payer: COMMERCIAL

## 2024-02-20 VITALS
SYSTOLIC BLOOD PRESSURE: 106 MMHG | DIASTOLIC BLOOD PRESSURE: 62 MMHG | OXYGEN SATURATION: 99 % | BODY MASS INDEX: 28.94 KG/M2 | WEIGHT: 196 LBS | HEART RATE: 62 BPM | TEMPERATURE: 98.2 F

## 2024-02-20 DIAGNOSIS — L30.9 DERMATITIS: Primary | ICD-10-CM

## 2024-02-20 PROCEDURE — 99213 OFFICE O/P EST LOW 20 MIN: CPT | Performed by: PHYSICIAN ASSISTANT

## 2024-02-20 RX ORDER — PREDNISONE 10 MG/1
TABLET ORAL
Qty: 30 TABLET | Refills: 0 | Status: SHIPPED | OUTPATIENT
Start: 2024-02-20

## 2024-02-20 ASSESSMENT — ENCOUNTER SYMPTOMS
GASTROINTESTINAL NEGATIVE: 1
RESPIRATORY NEGATIVE: 1

## 2024-02-20 NOTE — PROGRESS NOTES
results found for this visit on 24.    FINALIMPRESSION      Visit Diagnoses and Associated Orders       ORDERS WITHOUT AN ASSOCIATED DIAGNOSIS    predniSONE (DELTASONE) 10 MG tablet [6494]                PLAN     No follow-ups on file.      DISCHARGEMEDICATIONS:  Orders Placed This Encounter   Medications    predniSONE (DELTASONE) 10 MG tablet     Si pills per day X 4 days, 3 pills per day x 3 days, 2 pills per day x 2 days and 1 pill per day x 1 day     Dispense:  30 tablet     Refill:  0         Plan:  1. Keep the rash clean and dry  2. Apply all medication as prescribed  3. Take all medication as prescribed  4. Patient educated on signs/ symptoms of infection  5. Patient to follow up with PCP or go to the ER if rash persists or gets worse.   6. Patient given educational material  7. Patient understands and is agreeable.     Patient instructed to return to the office if symptoms worsen, return, or have any other concerns.Patient understands and is agreeable.         Di Patel PA-C 2024 11:36 AM

## 2024-02-22 ENCOUNTER — OFFICE VISIT (OUTPATIENT)
Dept: BEHAVIORAL/MENTAL HEALTH CLINIC | Age: 44
End: 2024-02-22
Payer: COMMERCIAL

## 2024-02-22 DIAGNOSIS — F43.23 ADJUSTMENT DISORDER WITH MIXED ANXIETY AND DEPRESSED MOOD: Primary | ICD-10-CM

## 2024-02-22 PROCEDURE — 90834 PSYTX W PT 45 MINUTES: CPT | Performed by: COUNSELOR

## 2024-02-22 NOTE — PROGRESS NOTES
ADULT BEHAVIORAL HEALTH FOLLOW UP  Jessica Lomeli       Visit Date: 2/22/2024   Time of appointment:  11:04a   Time spent with Patient: 47 minutes.   This is patient's sixth appointment.    Reason for Consult:  Follow-up     Referring Provider/PCP:    No ref. provider found  Oxana Nance, APRN - CNP      Pt provided informed consent for the behavioral health program. Discussed with patient model of service to include the limits of confidentiality (i.e. abuse reporting, suicide intervention, etc.) and short-term intervention focused approach.  Pt indicated understanding.    BUSTER Hackett is a 43 y.o. female who presents for follow up of anxiety and depression. She reported she is currently dealing with an allergic reaction and processed frustrations around continuing to be sick. Therapist provided active listening and validated clt's feelings. She reported she did not work on homework for self-esteem. Relationships were continued to be processed and how she is handling communication with ex. She continued processing uncertainty around seeing ex today due to these stressors. She began working with therapist on how to incorporate healthier communication skills. Therapist utilized perspective taking and assisted clt in identifying how to utilize this tool. She reported she decided to journal the other night and this was helpful and that while journaling she identified she has been putting off grieving dad and began processing how this is negatively impacting her. Therapist provided psychoeducation on grief/loss and provided with grief packet and began working through packet with clt. She was able to identify ways she has worked on grief and the barriers she is having in allowing herself to work on this. Therapist encouraged her to incorporate self-care and support sx and encouraged her to try journaling again. She reported she could try this.     Previous Recommendations: Encouraged to keep using coping

## 2024-03-05 DIAGNOSIS — F41.9 ANXIETY: ICD-10-CM

## 2024-03-05 DIAGNOSIS — F32.A DEPRESSION, UNSPECIFIED DEPRESSION TYPE: ICD-10-CM

## 2024-03-05 NOTE — TELEPHONE ENCOUNTER
LOV 1/29/24  LRF 8/17/23  RTO Scheduled    Health Maintenance   Topic Date Due    Varicella vaccine (1 of 2 - 2-dose childhood series) Never done    COVID-19 Vaccine (5 - 2023-24 season) 12/11/2024 (Originally 9/1/2023)    Hepatitis B vaccine (2 of 3 - 19+ 3-dose series) 12/14/2024 (Originally 9/9/2002)    DTaP/Tdap/Td vaccine (2 - Td or Tdap) 10/16/2024    Depression Monitoring  01/29/2025    Cervical cancer screen  06/24/2026    Lipids  02/24/2027    Flu vaccine  Completed    Hepatitis C screen  Completed    HIV screen  Completed    Hepatitis A vaccine  Aged Out    Hib vaccine  Aged Out    HPV vaccine  Aged Out    Polio vaccine  Aged Out    Meningococcal (ACWY) vaccine  Aged Out    Pneumococcal 0-64 years Vaccine  Aged Out             (applicable per patient's age: Cancer Screenings, Depression Screening, Fall Risk Screening, Immunizations)    LDL Cholesterol (mg/dL)   Date Value   02/24/2022 117     AST (U/L)   Date Value   12/14/2023 19     ALT (U/L)   Date Value   12/14/2023 19     BUN (mg/dL)   Date Value   12/14/2023 17      (goal A1C is < 7)   (goal LDL is <100) need 30-50% reduction from baseline     BP Readings from Last 3 Encounters:   02/20/24 106/62   01/29/24 112/78   01/24/24 108/72    (goal /80)      All Future Testing planned in [facility]:  Lab Frequency Next Occurrence   ECHO 2D WO Color Doppler Complete Once 06/08/2023   EKG 12 Lead Once 06/08/2023       Next Visit Date:  Future Appointments   Date Time Provider Department Center   3/14/2024 11:00 AM Jessica LomeliY [facility]   6/13/2024  9:30 AM Oxana Nance, APRN - CNP [facility]            Patient Active Problem List:     Allergic rhinitis     GERD (gastroesophageal reflux disease)     Migraine without aura     Constipation     Generalized anxiety disorder     Hyperlipidemia     High risk HPV infection     Vasovagal syncope     Lichen sclerosus     Dysplasia of cervix, high grade CAMERON 2     Vulvar intraepithelial

## 2024-03-08 RX ORDER — FLUVOXAMINE MALEATE 50 MG/1
50 TABLET, COATED ORAL NIGHTLY
Qty: 30 TABLET | Refills: 0 | Status: SHIPPED | OUTPATIENT
Start: 2024-03-08 | End: 2025-03-09

## 2024-03-14 ENCOUNTER — TELEMEDICINE (OUTPATIENT)
Dept: BEHAVIORAL/MENTAL HEALTH CLINIC | Age: 44
End: 2024-03-14
Payer: COMMERCIAL

## 2024-03-14 DIAGNOSIS — F43.23 ADJUSTMENT DISORDER WITH MIXED ANXIETY AND DEPRESSED MOOD: Primary | ICD-10-CM

## 2024-03-14 PROCEDURE — 90837 PSYTX W PT 60 MINUTES: CPT | Performed by: COUNSELOR

## 2024-03-14 NOTE — PROGRESS NOTES
1/29/2024    12:58 PM 4/6/2023    11:45 AM 2/24/2022    12:42 PM 1/17/2018     9:57 AM   PHQ Scores   PHQ2 Score 0 2 3 2   PHQ9 Score 0 18 17 2     Interpretation of Total Score Depression Severity: 1-4 = Minimal depression, 5-9 = Mild depression, 10-14 = Moderate depression, 15-19 = Moderately severe depression, 20-27 = Severe depression    How often pt has had thoughts of death or hurting self (if PHQ positive for depression):           4/6/2023    11:00 AM 2/24/2022    12:00 PM   HONEY 7 SCORE   HONEY-7 Total Score 15 16     Interpretation of HONEY-7 score: 5-9 = mild anxiety, 10-14 = moderate anxiety, 15+ = severe anxiety. Recommend referral to behavioral health for scores 10 or greater.      DIAGNOSIS  Roxann was seen today for follow-up.    Diagnoses and all orders for this visit:    Adjustment disorder with mixed anxiety and depressed mood        INTERVENTION  Practiced assertive communication, Trained in strategies for increasing balanced thinking, Discussed and set plan for behavioral activation, Trained in relaxation strategies, Trained in improving communication skills, Discussed potential treatments for  depression, anxiety, and stress, Provided education, Motivational Interviewing to determine importance and readiness for change, Discussed potential barriers to change, Supportive techniques, and Motivational Interviewing to target pt's barriers to change goals    PLAN  Encouraged to work on journaling about grief/loss around dad as well as caregiver stressors. Encouraged to write a fake letter to ex that allows her to process emotions and experience cathartic release. Encouraged to work on communication skills identified.     INTERACTIVE COMPLEXITY  Is interactive complexity present?  No  Reason:    Additional Supporting Information:  N/A     Electronically signed by Jessica Lomeli on 3/14/2024 at 11:04 AM

## 2024-04-04 ENCOUNTER — OFFICE VISIT (OUTPATIENT)
Dept: BEHAVIORAL/MENTAL HEALTH CLINIC | Age: 44
End: 2024-04-04
Payer: COMMERCIAL

## 2024-04-04 DIAGNOSIS — F43.23 ADJUSTMENT DISORDER WITH MIXED ANXIETY AND DEPRESSED MOOD: Primary | ICD-10-CM

## 2024-04-04 PROCEDURE — 90837 PSYTX W PT 60 MINUTES: CPT | Performed by: COUNSELOR

## 2024-04-04 NOTE — PROGRESS NOTES
ADULT BEHAVIORAL HEALTH FOLLOW UP  Jessica Lomeli       Visit Date: 4/4/2024   Time of appointment:  10:03a   Time spent with Patient: 56 minutes.   This is patient's eighth appointment.    Reason for Consult:  Follow-up     Referring Provider/PCP:    No ref. provider found  Oxana Nance, APRN - CNP      Pt provided informed consent for the behavioral health program. Discussed with patient model of service to include the limits of confidentiality (i.e. abuse reporting, suicide intervention, etc.) and short-term intervention focused approach.  Pt indicated understanding.    BUSTER Hackett is a 43 y.o. female who presents for follow up of anxiety and depression. She processed how she handled Easter at her house and identified after dinner she left to spend time with a friend. Therapist provided active listening and validated feelings. She processed how she handled family stressors and she identified she has difficulty conversing with some of her family due to feeling like she \"has nothing to contribute\" and identified she compares herself to her family's accomplishments. She reported her sister asked how they could help her with taking care of mom and she processed her frustration around this as it led to her feeling \"stuck\". Therapist provided psychoeducation on how to boundary set and communicate her needs to siblings. She practiced how to use these tools in session and identified this was helpful. She processed the rough draft letter she wrote to her friend and processed feelings around what she wants to do next and uncomfortability with communicating her needs. Therapist provided psychoeducation on healthier relationship characteristics and how to work on communication skills to advocate for herself. Therapist confronted maladaptive thinking and practiced in session how to use these tools to manage negative self-talk. Therapist utilized MI to assess insight into barriers to goals and how to incorporate

## 2024-04-17 DIAGNOSIS — F41.9 ANXIETY: ICD-10-CM

## 2024-04-17 DIAGNOSIS — F32.A DEPRESSION, UNSPECIFIED DEPRESSION TYPE: ICD-10-CM

## 2024-04-18 ENCOUNTER — OFFICE VISIT (OUTPATIENT)
Dept: BEHAVIORAL/MENTAL HEALTH CLINIC | Age: 44
End: 2024-04-18
Payer: COMMERCIAL

## 2024-04-18 DIAGNOSIS — F43.23 ADJUSTMENT DISORDER WITH MIXED ANXIETY AND DEPRESSED MOOD: Primary | ICD-10-CM

## 2024-04-18 PROCEDURE — 90834 PSYTX W PT 45 MINUTES: CPT | Performed by: COUNSELOR

## 2024-04-18 NOTE — PROGRESS NOTES
depression, 20-27 = Severe depression    How often pt has had thoughts of death or hurting self (if PHQ positive for depression):           4/6/2023    11:00 AM 2/24/2022    12:00 PM   HONEY 7 SCORE   HONEY-7 Total Score 15 16     Interpretation of HONEY-7 score: 5-9 = mild anxiety, 10-14 = moderate anxiety, 15+ = severe anxiety. Recommend referral to behavioral health for scores 10 or greater.      DIAGNOSIS  Roxann was seen today for follow-up.    Diagnoses and all orders for this visit:    Adjustment disorder with mixed anxiety and depressed mood        INTERVENTION  Practiced assertive communication, Trained in strategies for increasing balanced thinking, Discussed and set plan for behavioral activation, Trained in relaxation strategies, Trained in improving communication skills, Discussed potential treatments for  depression, anxiety, and stress, Provided education, Discussed self-care (sleep, nutrition, rewarding activities, social support, exercise), Motivational Interviewing to determine importance and readiness for change, Discussed potential barriers to change, Supportive techniques, and Motivational Interviewing to target pt's barriers to goals.     PLAN  Encouraged to read over script made in session and add to it as needed. Encouraged to continue to confront maladaptive thinking.     INTERACTIVE COMPLEXITY  Is interactive complexity present?  No  Reason:    Additional Supporting Information:  N/A     Electronically signed by Jessica Lomeli on 4/18/2024 at 11:17 AM

## 2024-04-18 NOTE — TELEPHONE ENCOUNTER
Dysplasia of cervix, high grade CAMERON 2     Vulvar intraepithelial neoplasia (NELIDA) grade 1     S/P LEEP 7/5/18 with IUD removal     Left ovarian cyst     Other fatigue     Tension type headache     Syncope, near     Pain in left hip     Other chronic pain     Orthostatic hypotension     Lumbago with sciatica, right side     Functional dyspepsia     Exercise anaphylaxis     Early satiety     Disorder of adrenal gland (HCC)     Cushingoid facies     Vulvar pain     Depression     Sleeping difficulty     Adjustment disorder with mixed anxiety and depressed mood

## 2024-04-19 RX ORDER — FLUVOXAMINE MALEATE 50 MG/1
50 TABLET, COATED ORAL NIGHTLY
Qty: 30 TABLET | Refills: 2 | Status: SHIPPED | OUTPATIENT
Start: 2024-04-19 | End: 2025-04-20

## 2024-05-02 ENCOUNTER — OFFICE VISIT (OUTPATIENT)
Dept: BEHAVIORAL/MENTAL HEALTH CLINIC | Age: 44
End: 2024-05-02
Payer: COMMERCIAL

## 2024-05-02 DIAGNOSIS — F43.23 ADJUSTMENT DISORDER WITH MIXED ANXIETY AND DEPRESSED MOOD: Primary | ICD-10-CM

## 2024-05-02 PROCEDURE — 90834 PSYTX W PT 45 MINUTES: CPT | Performed by: COUNSELOR

## 2024-05-02 NOTE — PROGRESS NOTES
ADULT BEHAVIORAL HEALTH FOLLOW UP  Jessica Lomeli       Visit Date: 5/2/2024   Time of appointment:  11:14a   Time spent with Patient: 39 minutes.   This is patient's tenth appointment.    Reason for Consult:  Follow-up     Referring Provider/PCP:    No ref. provider found  Oxana Nance, APRN - CNP      Pt provided informed consent for the behavioral health program. Discussed with patient model of service to include the limits of confidentiality (i.e. abuse reporting, suicide intervention, etc.) and short-term intervention focused approach.  Pt indicated understanding.    BUSTER Hackett is a 43 y.o. female who presents for follow up of anxiety and depression. She reported she got her new progressive glasses and is having a hard time adjusting. She reported she is still struggling with her migraines. She continued processing work stressors. She presented tearful processing stressors and difficulty advocating for herself. She acknowledged she noticed a change in her ability to advocate for herself after her dad passed. Therapist utilized MI to assess insight into change behaviors. Therapist assisted her in setting realistic goals and how to overcome barriers. She reported that she has not looked over the script from last session and reported this was due to not feeling physically well. Physical health frustrations were continued to be processed.     Previous Recommendations: Encouraged to read over script made in session and add to it as needed. Encouraged to continue to confront maladaptive thinking.             MENTAL STATUS EXAM  Mood was within normal limits with calm affect.   Suicidal ideation was denied.   Homicidal ideation was denied.   Hygiene was fair .  Dress was appropriate.   Behavior was Within Normal Limits with No observation or self-report of difficulties ambulating.   Attitude was Cooperative.  Eye-contact was fair.  Speech: rate - WNL, rhythm - WNL, volume - WNL.  Verbalizations were goal

## 2024-05-23 ENCOUNTER — OFFICE VISIT (OUTPATIENT)
Dept: BEHAVIORAL/MENTAL HEALTH CLINIC | Age: 44
End: 2024-05-23
Payer: COMMERCIAL

## 2024-05-23 DIAGNOSIS — F43.23 ADJUSTMENT DISORDER WITH MIXED ANXIETY AND DEPRESSED MOOD: Primary | ICD-10-CM

## 2024-05-23 PROCEDURE — 90834 PSYTX W PT 45 MINUTES: CPT | Performed by: COUNSELOR

## 2024-05-23 NOTE — PROGRESS NOTES
ADULT BEHAVIORAL HEALTH FOLLOW UP  Jessica Lomeli       Visit Date: 5/23/2024   Time of appointment:  11:10    Time spent with Patient: 43 minutes.   This is patient's 11th appointment.    Reason for Consult:  Follow-up     Referring Provider/PCP:    No ref. provider found  Oxana Nance, APRN - CNP      Pt provided informed consent for the behavioral health program. Discussed with patient model of service to include the limits of confidentiality (i.e. abuse reporting, suicide intervention, etc.) and short-term intervention focused approach.  Pt indicated understanding.    BUSTER Hackett is a 43 y.o. female who presents for follow up of anxiety and depression. She reported she is still battling her migraines and this is impacting her ability to go to work. Therapist provided active listening and validated feelings. She reported she is also dealing with allergies and this is impacting her functioning. She reported she is going to talk to her friend today about her concerns. She identified she is anxious about this interaction and processed this with therapist. She continued processing pros/cons of advocating for herself. Therapist provided psychoeducation on how to stay firm in her communication skills and reviewed how she could use these tools with this upcoming conversation today. Therapist provided psychoeducation on self-care and utilized MI to assess insight into using healthier ways to cope. She was able to goal set for today and how she could incorporate self-care into today. She continued processing work stressors and her interest in finding different work. She continued processing financial stressors. Therapist utilized MI to assess insight into behavioral changes.     Previous Recommendations: Encouraged to work on self-care and coping skills. Encouraged to look at script written to friend. Encouraged to work on boundary setting.             MENTAL STATUS EXAM  Mood was anxious with calm affect.

## 2024-06-13 ENCOUNTER — OFFICE VISIT (OUTPATIENT)
Dept: BEHAVIORAL/MENTAL HEALTH CLINIC | Age: 44
End: 2024-06-13
Payer: COMMERCIAL

## 2024-06-13 ENCOUNTER — OFFICE VISIT (OUTPATIENT)
Dept: FAMILY MEDICINE CLINIC | Age: 44
End: 2024-06-13
Payer: COMMERCIAL

## 2024-06-13 VITALS
TEMPERATURE: 98.4 F | WEIGHT: 196 LBS | SYSTOLIC BLOOD PRESSURE: 112 MMHG | DIASTOLIC BLOOD PRESSURE: 66 MMHG | HEART RATE: 78 BPM | BODY MASS INDEX: 28.94 KG/M2 | OXYGEN SATURATION: 98 %

## 2024-06-13 DIAGNOSIS — F43.23 ADJUSTMENT DISORDER WITH MIXED ANXIETY AND DEPRESSED MOOD: Primary | ICD-10-CM

## 2024-06-13 DIAGNOSIS — F32.A DEPRESSION, UNSPECIFIED DEPRESSION TYPE: ICD-10-CM

## 2024-06-13 DIAGNOSIS — M54.81 BILATERAL OCCIPITAL NEURALGIA: ICD-10-CM

## 2024-06-13 DIAGNOSIS — F41.9 ANXIETY: Primary | ICD-10-CM

## 2024-06-13 DIAGNOSIS — Z12.31 ENCOUNTER FOR SCREENING MAMMOGRAM FOR MALIGNANT NEOPLASM OF BREAST: ICD-10-CM

## 2024-06-13 DIAGNOSIS — E27.9 DISORDER OF ADRENAL GLAND (HCC): ICD-10-CM

## 2024-06-13 DIAGNOSIS — G43.811 OTHER MIGRAINE WITH STATUS MIGRAINOSUS, INTRACTABLE: ICD-10-CM

## 2024-06-13 PROBLEM — K30 FUNCTIONAL DYSPEPSIA: Status: RESOLVED | Noted: 2017-11-08 | Resolved: 2024-06-13

## 2024-06-13 PROCEDURE — 90834 PSYTX W PT 45 MINUTES: CPT | Performed by: COUNSELOR

## 2024-06-13 PROCEDURE — 99214 OFFICE O/P EST MOD 30 MIN: CPT

## 2024-06-13 RX ORDER — MAGNESIUM GLUCONATE 27 MG(500)
500 TABLET ORAL 2 TIMES DAILY
COMMUNITY

## 2024-06-13 RX ORDER — FLUVOXAMINE MALEATE 50 MG/1
50 TABLET, COATED ORAL NIGHTLY
Qty: 90 TABLET | Refills: 1 | Status: SHIPPED | OUTPATIENT
Start: 2024-06-13 | End: 2025-06-13

## 2024-06-13 RX ORDER — ALPRAZOLAM 1 MG/1
TABLET ORAL
COMMUNITY
Start: 2024-05-09

## 2024-06-13 RX ORDER — CHOLECALCIFEROL (VITAMIN D3) 125 MCG
1.25 CAPSULE ORAL
COMMUNITY

## 2024-06-13 SDOH — ECONOMIC STABILITY: FOOD INSECURITY: WITHIN THE PAST 12 MONTHS, THE FOOD YOU BOUGHT JUST DIDN'T LAST AND YOU DIDN'T HAVE MONEY TO GET MORE.: NEVER TRUE

## 2024-06-13 SDOH — ECONOMIC STABILITY: INCOME INSECURITY: HOW HARD IS IT FOR YOU TO PAY FOR THE VERY BASICS LIKE FOOD, HOUSING, MEDICAL CARE, AND HEATING?: NOT HARD AT ALL

## 2024-06-13 SDOH — ECONOMIC STABILITY: FOOD INSECURITY: WITHIN THE PAST 12 MONTHS, YOU WORRIED THAT YOUR FOOD WOULD RUN OUT BEFORE YOU GOT MONEY TO BUY MORE.: NEVER TRUE

## 2024-06-13 ASSESSMENT — PATIENT HEALTH QUESTIONNAIRE - PHQ9
SUM OF ALL RESPONSES TO PHQ9 QUESTIONS 1 & 2: 0
SUM OF ALL RESPONSES TO PHQ QUESTIONS 1-9: 0
1. LITTLE INTEREST OR PLEASURE IN DOING THINGS: NOT AT ALL
2. FEELING DOWN, DEPRESSED OR HOPELESS: NOT AT ALL

## 2024-06-13 NOTE — PROGRESS NOTES
ADULT BEHAVIORAL HEALTH FOLLOW UP  Jessica Lomeli       Visit Date: 6/13/2024   Time of appointment:  11:08a   Time spent with Patient: 41 minutes.   This is patient's 12th appointment.    Reason for Consult:  Follow-up     Referring Provider/PCP:    No ref. provider found  Oxana Nance, APRN - CNP      Pt provided informed consent for the behavioral health program. Discussed with patient model of service to include the limits of confidentiality (i.e. abuse reporting, suicide intervention, etc.) and short-term intervention focused approach.  Pt indicated understanding.    BUSTER Hackett is a 43 y.o. female who presents for follow up of anxiety and depression. She processed how conversation that she has been working in therapy on went with her friend. Therapist provided active listening and validated feelings. She reported she felt relief after having conversation and processed how friendship has transpired after this conversation. Therapist assisted her in finding ways to overcome anxious sx. Therapist confronted maladaptive thinking and provided psychoeducation on how to continue to advocate her needs. Therapist validated her ability to advocate for herself and have a harder conversation. She acknowledged that this conversation went better than expected and she identified being direct helped her. Goals were reviewed and barriers identified.     Previous Recommendations: Encouraged to work on advocacy abilities and self-care. Encouraged to work on confronting maladaptive thinking and use boundary tools.             MENTAL STATUS EXAM  Mood was within normal limits with calm affect.   Suicidal ideation was denied.   Homicidal ideation was denied.   Hygiene was good .  Dress was appropriate.   Behavior was Within Normal Limits with No observation or self-report of difficulties ambulating.   Attitude was Cooperative.  Eye-contact was fair.  Speech: rate - WNL, rhythm - WNL, volume - WNL.  Verbalizations were  Prophylactic measure

## 2024-06-13 NOTE — PROGRESS NOTES
Roxann Dgigs (:  1980) is a 43 y.o. female,Established patient, here for evaluation of the following chief complaint(s):  Anxiety and Depression      Assessment & Plan   1. Anxiety  -     fluvoxaMINE (LUVOX) 50 MG tablet; Take 1 tablet by mouth nightly, Disp-90 tablet, R-1Normal  2. Encounter for screening mammogram for malignant neoplasm of breast  -     CRISTIN AIVNASH DIGITAL SCREEN BILATERAL; Future  3. Disorder of adrenal gland (HCC)  -     AFL - Prabhu Randhawa MD, Endocrinology, [place]  4. Depression, unspecified depression type  -     fluvoxaMINE (LUVOX) 50 MG tablet; Take 1 tablet by mouth nightly, Disp-90 tablet, R-1Normal  5. Bilateral occipital neuralgia  -     Regina Cortez MD, Pain Management, [place]  6. Other migraine with status migrainosus, intractable  -     Regina Cortez MD, Pain Management, [place]    Continue meds as discussed  Reviewed several prior visit notes with specialist  Close follow up with specialist   Encouraged healthy diet, low in carbs, fats, and exercise 3-4 days weekly.   May be candidate for occipital NB given assessment and presentation of pain.      Return in about 6 months (around 2024) for Routine physical .       Subjective   Patient here for follow up on her anxiety and depression.  Overall she has been doing well with mental health.  She is continuing to see therapy routinely.      She did have hiatal hernia surgery several months ago and has been able to stop her PPI.  She is doing well and plans to follow up with GI.     She did see sleep medicine and was diagnosed with BERNARDA and has her apnea machine ordered.     She has had a prior referral to endocrine prior noted adrenal disorder, but they never scheduled her.  This was a year ago.  We will place new referral.     She does continue to sees neurology for migraine management.  She has been on Topamax, but does continue to get a headache at least 2 times a week.  She has a

## 2024-06-19 ASSESSMENT — ENCOUNTER SYMPTOMS: APNEA: 1

## 2024-06-27 ENCOUNTER — OFFICE VISIT (OUTPATIENT)
Dept: BEHAVIORAL/MENTAL HEALTH CLINIC | Age: 44
End: 2024-06-27
Payer: COMMERCIAL

## 2024-06-27 DIAGNOSIS — F43.23 ADJUSTMENT DISORDER WITH MIXED ANXIETY AND DEPRESSED MOOD: Primary | ICD-10-CM

## 2024-06-27 PROCEDURE — 90832 PSYTX W PT 30 MINUTES: CPT | Performed by: COUNSELOR

## 2024-06-27 NOTE — PROGRESS NOTES
PATIENTS WIFE CALLED WANTING TO GET AMBER WIFE A CALL     Trained in relaxation strategies, Trained in improving communication skills, Discussed potential treatments for  depression, anxiety, and stress, Provided education, Discussed self-care (sleep, nutrition, rewarding activities, social support, exercise), Motivational Interviewing to determine importance and readiness for change, Discussed potential barriers to change, Supportive techniques, and Motivational Interviewing to target pt's barriers to goals.     PLAN  Encouraged to work on communication skills and advocacy abilities. Encouraged to confront maladaptive thinking and work on goals.     INTERACTIVE COMPLEXITY  Is interactive complexity present?  No  Reason:    Additional Supporting Information:  N/A     Electronically signed by Jessica Lomeli on 6/27/2024 at 11:29 AM

## 2024-07-11 ENCOUNTER — OFFICE VISIT (OUTPATIENT)
Dept: BEHAVIORAL/MENTAL HEALTH CLINIC | Age: 44
End: 2024-07-11
Payer: COMMERCIAL

## 2024-07-11 DIAGNOSIS — F43.23 ADJUSTMENT DISORDER WITH MIXED ANXIETY AND DEPRESSED MOOD: Primary | ICD-10-CM

## 2024-07-11 PROCEDURE — 90837 PSYTX W PT 60 MINUTES: CPT | Performed by: COUNSELOR

## 2024-07-11 NOTE — PROGRESS NOTES
ADULT BEHAVIORAL HEALTH FOLLOW UP  Jessica Lomeli       Visit Date: 7/11/2024   Time of appointment:  10:04a   Time spent with Patient: 53 minutes.   This is patient's 14th appointment.    Reason for Consult:  Follow-up     Referring Provider/PCP:    No ref. provider found  Oxana Nance, APRN - CNP      Pt provided informed consent for the behavioral health program. Discussed with patient model of service to include the limits of confidentiality (i.e. abuse reporting, suicide intervention, etc.) and short-term intervention focused approach.  Pt indicated understanding.    BUSTER Hackett is a 43 y.o. female who presents for follow up of anxiety and depression. She processed how her holiday went. She identified her friend came down and she enjoyed his company. Therapist provided active listening and validated feelings. Communication skills were reviewed and how she handled conflicts and miscommunication. Therapist confronted maladaptive thinking and difficulty perspective taking. Therapist utilized MI to assess barriers to healthier decision making and how to incorporate healthier behavioral changes. Therapist provided psychoeducation on healthy vs unhealthy relationship characteristics and she was able to begin to identify characteristics she needs in a partner. Therapist provided psychoeducation on how to work on communication tools and reduce negative self-talk. She continued processing her self-esteem and how this has impacted her decision making along with difficulty letting go of past conflicts. She presented tearful processing stressors. She was open to feedback.     Previous Recommendations: Encouraged to work on communication skills and advocacy abilities. Encouraged to confront maladaptive thinking and work on goals.             MENTAL STATUS EXAM  Mood was within normal limits with calm affect.   Suicidal ideation was denied.   Homicidal ideation was denied.   Hygiene was fair .  Dress was

## 2024-07-22 ENCOUNTER — INITIAL CONSULT (OUTPATIENT)
Dept: PAIN MANAGEMENT | Age: 44
End: 2024-07-22
Payer: COMMERCIAL

## 2024-07-22 VITALS — BODY MASS INDEX: 29.03 KG/M2 | WEIGHT: 196 LBS | HEIGHT: 69 IN

## 2024-07-22 DIAGNOSIS — M47.812 CERVICAL SPONDYLOSIS: Primary | ICD-10-CM

## 2024-07-22 DIAGNOSIS — R51.9 NONINTRACTABLE HEADACHE, UNSPECIFIED CHRONICITY PATTERN, UNSPECIFIED HEADACHE TYPE: ICD-10-CM

## 2024-07-22 PROCEDURE — 99204 OFFICE O/P NEW MOD 45 MIN: CPT | Performed by: PAIN MEDICINE

## 2024-07-22 NOTE — PROGRESS NOTES
and perception normal.         Mood and Affect: Mood and affect normal.       Record/Diagnostics Review:    As above    Orders:  Orders Placed This Encounter   Procedures    XR CERVICAL SPINE (2-3 VIEWS)    MRI CERVICAL SPINE WO CONTRAST    Ambulatory referral to Physical Therapy         Assessment:  1. Cervical spondylosis    2. Nonintractable headache, unspecified chronicity pattern, unspecified headache type        Treatment Plan:  DISCUSSION: Treatment options discussed with patient and all questions answered to patient's satisfaction.  Risks, benefits, alternatives of treatment discussed with patient.    OARRS Review: Reviewed and acceptable for medications prescribed.  TREATMENT OPTIONS:     Discussed different treatment options including continued conservative care such as physical therapy, chiropractic care, acupuncture.  Discussed different interventional options such as epidural steroids or medial branch blocks.  Also discussed surgical evaluation.    Continue neurology follow-up, she is established with neurologist, consider occipital nerve block, she will discuss with neurology.    Does also complain of neck pain.  Discussed the importance of continued physical therapy and exercise program as well.  XR C spine  Has failed conservative measures, including physical therapy and the pain is worsening, I do believe an MRI of the Cervical spine is indicated to further evaluate pathology and guide treatment plan.  Consider injection therapies versus surgical evaluation based on imaging results.        Sheriff Griffin M.D.        I have reviewed the chief complaint and history of present illness (including ROS and PFSH) and vital documentation by my staff and I agree with their documentation and have added where applicable.

## 2024-07-24 ENCOUNTER — TELEPHONE (OUTPATIENT)
Dept: FAMILY MEDICINE CLINIC | Age: 44
End: 2024-07-24

## 2024-07-24 NOTE — TELEPHONE ENCOUNTER
Called to remind of appointment for 7.25. Roxann states not able to come tomorrow. States will be in for 8.8.24 appointment.

## 2024-08-08 ENCOUNTER — HOSPITAL ENCOUNTER (OUTPATIENT)
Dept: GENERAL RADIOLOGY | Age: 44
Discharge: HOME | End: 2024-08-10
Attending: PAIN MEDICINE
Payer: COMMERCIAL

## 2024-08-08 ENCOUNTER — HOSPITAL ENCOUNTER (OUTPATIENT)
Dept: PHYSICAL THERAPY | Facility: CLINIC | Age: 44
Setting detail: THERAPIES SERIES
Discharge: HOME | End: 2024-08-08
Payer: COMMERCIAL

## 2024-08-08 ENCOUNTER — HOSPITAL ENCOUNTER (OUTPATIENT)
Dept: MRI IMAGING | Age: 44
Discharge: HOME | End: 2024-08-10
Attending: PAIN MEDICINE
Payer: COMMERCIAL

## 2024-08-08 ENCOUNTER — HOSPITAL ENCOUNTER (OUTPATIENT)
Age: 44
Discharge: HOME | End: 2024-08-10
Attending: PAIN MEDICINE
Payer: COMMERCIAL

## 2024-08-08 ENCOUNTER — OFFICE VISIT (OUTPATIENT)
Dept: BEHAVIORAL/MENTAL HEALTH CLINIC | Age: 44
End: 2024-08-08
Payer: COMMERCIAL

## 2024-08-08 DIAGNOSIS — M47.812 CERVICAL SPONDYLOSIS: ICD-10-CM

## 2024-08-08 DIAGNOSIS — F43.23 ADJUSTMENT DISORDER WITH MIXED ANXIETY AND DEPRESSED MOOD: Primary | ICD-10-CM

## 2024-08-08 PROCEDURE — 72040 X-RAY EXAM NECK SPINE 2-3 VW: CPT

## 2024-08-08 PROCEDURE — 97161 PT EVAL LOW COMPLEX 20 MIN: CPT

## 2024-08-08 PROCEDURE — 97110 THERAPEUTIC EXERCISES: CPT

## 2024-08-08 PROCEDURE — 90834 PSYTX W PT 45 MINUTES: CPT | Performed by: COUNSELOR

## 2024-08-08 PROCEDURE — 72141 MRI NECK SPINE W/O DYE: CPT

## 2024-08-08 NOTE — PROGRESS NOTES
ADULT BEHAVIORAL HEALTH FOLLOW UP  Jessica Lomeli       Visit Date: 8/8/2024   Time of appointment:  11:06a   Time spent with Patient: 44 minutes.   This is patient's 15th appointment.    Reason for Consult:  Follow-up     Referring Provider/PCP:    No ref. provider found  Oxana Nance, APRN - CNP      Pt provided informed consent for the behavioral health program. Discussed with patient model of service to include the limits of confidentiality (i.e. abuse reporting, suicide intervention, etc.) and short-term intervention focused approach.  Pt indicated understanding.    BUSTER Hackett is a 43 y.o. female who presents for follow up of anxiety and depression. She processed how her family visiting went. Therapist provided active listening and validated feelings. Work stressors are continued to be processed and how she handled her work review. She identified she has been spending the night at her partner's and identified this could be messing with her sleep schedule and leading to her arriving to work late. She processed how she handled boyfriend's birthday at [place] and she identified feeling it is her fault he did not have a good time. Therapist confronted maladaptive thinking and utilized MI to assess insight into barriers to behavioral changes. Therapist provided psychoeducation on how to use healthier communication tools to advocate for herself. Therapist provided psychoeducation on how to confront maladaptive thinking and set more realistic expectations. She continued processing relationship stressors and how she is handling conflicts. Therapist assisted her in identifying ways to overcome barriers to goals.     Previous Recommendations: Encouraged to work on self-care and coping skills, confront maladaptive thinking, and work on communication skills and create realistic goals.             MENTAL STATUS EXAM  Mood was within normal limits with calm affect.   Suicidal ideation was denied.   Homicidal

## 2024-08-19 NOTE — FLOWSHEET NOTE
[] [facility]  Outpatient Rehabilitation &  Therapy  [address]  [phone]  [phone] [] [facility]  Outpatient Rehabilitation &  Therapy  [address]  P: [phone]  F: [phone] [] [facility]  Outpatient Rehabilitation &  Therapy  [address]  P: [phone]  F: [phone] [] [facility]  Outpatient Rehabilitation &  Therapy  [address]  [phone]  [phone] [] [facility]  Outpatient Rehabilitation &  Therapy  [address]   P: [phone]  F: [phone]  [] [facility]  Outpatient Rehabilitation &  Therapy  [address]  P: [phone]  F: [phone] [] [facility]  Outpatient Rehabilitation &  Therapy  [address]  [address]  P: [phone]  F: [phone] [] [facility]  Outpatient Rehabilitation &  Therapy  [address]  P: [phone]  F: [phone] [] [facility]  Outpatient Rehabilitation &  Therapy  [address]  P: [phone]  F: [phone]  [] [facility] Outpatient Rehabilitation &  Therapy  [address]  P: [phone]  F: [phone]     Physical Therapy Daily Treatment Note    Date:  24   Patient Name:  Roxann BUSH Dontae    :  1980  MRN: [id number]  Physician: Regina Moya MD                                     Insurance: [employer]; Cezar yr; vs; auth after eval; 20% coins; 1000/630.71 remaining ded; 6500/4475.27 remaining OOP   Medical Diagnosis: Cervical spondylosis [M47.812]                       Rehab Codes: M54.2  Onset Date: 2013             Next 's appt.: 2024  Visit# / total visits:     Cancels/No Shows: 0/0    Subjective:    Pain:  [x] Yes  [] No Location: cervical spine Pain Rating: (0-10 scale) 6-7/10  Pain altered Tx:  [x] No

## 2024-08-22 ENCOUNTER — OFFICE VISIT (OUTPATIENT)
Dept: BEHAVIORAL/MENTAL HEALTH CLINIC | Age: 44
End: 2024-08-22
Payer: COMMERCIAL

## 2024-08-22 ENCOUNTER — HOSPITAL ENCOUNTER (OUTPATIENT)
Dept: PHYSICAL THERAPY | Facility: CLINIC | Age: 44
Setting detail: THERAPIES SERIES
Discharge: HOME | End: 2024-08-22
Payer: COMMERCIAL

## 2024-08-22 DIAGNOSIS — F43.23 ADJUSTMENT DISORDER WITH MIXED ANXIETY AND DEPRESSED MOOD: Primary | ICD-10-CM

## 2024-08-22 PROCEDURE — 97110 THERAPEUTIC EXERCISES: CPT

## 2024-08-22 PROCEDURE — 90834 PSYTX W PT 45 MINUTES: CPT | Performed by: COUNSELOR

## 2024-08-22 PROCEDURE — 97140 MANUAL THERAPY 1/> REGIONS: CPT

## 2024-08-22 ASSESSMENT — PATIENT HEALTH QUESTIONNAIRE - PHQ9
7. TROUBLE CONCENTRATING ON THINGS, SUCH AS READING THE NEWSPAPER OR WATCHING TELEVISION: SEVERAL DAYS
SUM OF ALL RESPONSES TO PHQ9 QUESTIONS 1 & 2: 1
10. IF YOU CHECKED OFF ANY PROBLEMS, HOW DIFFICULT HAVE THESE PROBLEMS MADE IT FOR YOU TO DO YOUR WORK, TAKE CARE OF THINGS AT HOME, OR GET ALONG WITH OTHER PEOPLE: SOMEWHAT DIFFICULT
3. TROUBLE FALLING OR STAYING ASLEEP: MORE THAN HALF THE DAYS
5. POOR APPETITE OR OVEREATING: MORE THAN HALF THE DAYS
2. FEELING DOWN, DEPRESSED OR HOPELESS: NOT AT ALL
4. FEELING TIRED OR HAVING LITTLE ENERGY: NEARLY EVERY DAY
SUM OF ALL RESPONSES TO PHQ QUESTIONS 1-9: 13
SUM OF ALL RESPONSES TO PHQ QUESTIONS 1-9: 13
9. THOUGHTS THAT YOU WOULD BE BETTER OFF DEAD, OR OF HURTING YOURSELF: NOT AT ALL
SUM OF ALL RESPONSES TO PHQ QUESTIONS 1-9: 13
SUM OF ALL RESPONSES TO PHQ QUESTIONS 1-9: 13
8. MOVING OR SPEAKING SO SLOWLY THAT OTHER PEOPLE COULD HAVE NOTICED. OR THE OPPOSITE, BEING SO FIGETY OR RESTLESS THAT YOU HAVE BEEN MOVING AROUND A LOT MORE THAN USUAL: MORE THAN HALF THE DAYS
1. LITTLE INTEREST OR PLEASURE IN DOING THINGS: SEVERAL DAYS
6. FEELING BAD ABOUT YOURSELF - OR THAT YOU ARE A FAILURE OR HAVE LET YOURSELF OR YOUR FAMILY DOWN: MORE THAN HALF THE DAYS

## 2024-08-22 NOTE — PROGRESS NOTES
ADULT BEHAVIORAL HEALTH FOLLOW UP  Jessica Lomeli       Visit Date: 8/22/2024   Time of appointment:  9:16a   Time spent with Patient: 38 minutes.   This is patient's 16th appointment.    Reason for Consult:  Follow-up     Referring Provider/PCP:    No ref. provider found  Oxana Nance, APRN - CNP      Pt provided informed consent for the behavioral health program. Discussed with patient model of service to include the limits of confidentiality (i.e. abuse reporting, suicide intervention, etc.) and short-term intervention focused approach.  Pt indicated understanding.    BUSTER Hackett is a 43 y.o. female who presents for follow up of anxiety and depression. She reported she went to the ER earlier in the week and processed how she handled this. Therapist provided active listening and validated feelings. She continued processing how she is managing work stressors and she identified now she feels she shuts down when overwhelmed. Communication skills were reviewed and how she is trying to advocate for herself. She reported she has not talked to partner yet about moving concerns. Therapist assisted her in identifying healthier ways to communicate needs. Therapist provided psychoeducation on how to confront maladaptive thinking and incorporate healthier coping skills to better manage anxious sx.     Previous Recommendations:  Encouraged to work on communication tools, use coping/self-care skills, and continue to confront maladaptive thinking. Encouraged to create a script if she is getting stuck on how to communicate her needs.             MENTAL STATUS EXAM  Mood was within normal limits with calm affect.   Suicidal ideation was denied.   Homicidal ideation was denied.   Hygiene was fair .  Dress was appropriate.   Behavior was Within Normal Limits with No observation or self-report of difficulties ambulating.   Attitude was Cooperative.  Eye-contact was fair.  Speech: rate - WNL, rhythm - WNL, volume -

## 2024-08-26 ENCOUNTER — OFFICE VISIT (OUTPATIENT)
Dept: PAIN MANAGEMENT | Age: 44
End: 2024-08-26
Payer: COMMERCIAL

## 2024-08-26 VITALS — HEIGHT: 69 IN | WEIGHT: 196 LBS | BODY MASS INDEX: 29.03 KG/M2

## 2024-08-26 DIAGNOSIS — R51.9 OCCIPITAL HEADACHE: ICD-10-CM

## 2024-08-26 DIAGNOSIS — M47.812 CERVICAL SPONDYLOSIS: Primary | ICD-10-CM

## 2024-08-26 PROCEDURE — 99213 OFFICE O/P EST LOW 20 MIN: CPT | Performed by: PAIN MEDICINE

## 2024-08-26 NOTE — PROGRESS NOTES
HPI:     Neck Pain   This is a chronic problem. The current episode started more than 1 year ago. The problem has been unchanged. The pain is associated with nothing. The pain is present in the left side and right side. The quality of the pain is described as burning. The pain is at a severity of 7/10. The pain is severe. The symptoms are aggravated by bending, twisting and position. The pain is Worse during the day. Associated symptoms include headaches. Pertinent negatives include no numbness or weakness. She has tried ice, heat, home exercises, acetaminophen, chiropractic manipulation and muscle relaxants for the symptoms.     Neck pain along with associated headaches.  Does follow with neurology.  Has had Botox, was not helping so this was discontinued.  Neurology note reviewed.  Plans on occipital nerve block soon.  MRI cervical spine degenerative changes, x-ray of cervical spine degenerative changes.  Did fall and bumped her head last week.  Went to the ER and had CT of her cervical spine which did show degenerative changes    Pain ranges from a 3/10 to a 9/10 depending on activity.    Patient denies any new neurological symptoms. No bowel or bladder incontinence, no weakness, and no falling.    Review of OARRS does not show any aberrant prescription behavior.     Past Medical History:   Diagnosis Date    Acne     Allergic rhinitis     Chickenpox     Depression     Esophageal reflux     Functional dyspepsia     GERD (gastroesophageal reflux disease) 01/03/2012    Headache(784.0)     HPV (human papilloma virus) anogenital infection     Hyperlipidemia     Lichen     PONV (postoperative nausea and vomiting)     Syncope     Unspecified constipation     Unspecified sleep apnea        Past Surgical History:   Procedure Laterality Date    BREAST SURGERY Left     cyst removed    CAPSULE ENDOSCOPY N/A 8/30/2023    EGD, BRAVO PLACEMENT performed by Chapincito Davis DO at [facility] OR    COLONOSCOPY N/A 05/14/2014    RECTAL  ketoconazole (NIZORAL) 2 % shampoo, , Disp: , Rfl:     tretinoin (RETIN-A) 0.025 % cream, , Disp: , Rfl:     clobetasol prop emollient base 0.05 % CREA, Apply topically daily, Disp: , Rfl:     EPINEPHrine (EPIPEN) 0.3 MG/0.3ML SOAJ injection, Inject 0.3 mLs into the muscle as needed (exercise induce anaphlaxis), Disp: 1 each, Rfl: 1    guaiFENesin (MUCINEX) 600 MG extended release tablet, Take 1 tablet by mouth every 12 hours as needed, Disp: , Rfl:     levonorgestrel (MIRENA) IUD 52 mg, 1 each by IntraUTERine route, Disp: , Rfl:     topiramate (TOPAMAX) 50 MG tablet, , Disp: , Rfl: 3    SUMAtriptan (IMITREX) 50 MG tablet, Take 1 tablet by mouth once as needed for Migraine, Disp: , Rfl:     fexofenadine (ALLEGRA) 180 MG tablet, Take 1 tablet by mouth daily, Disp: , Rfl:     Family History   Problem Relation Age of Onset    Diabetes Mother     Cancer Mother         Breast    Hypertension Mother     Other Mother         pacemaker    Heart Attack Father     Arthritis Father     Heart Disease Maternal Grandmother     Diabetes Maternal Grandmother     Cancer Maternal Grandfather     Diabetes Paternal Grandmother     Heart Disease Paternal Grandmother     Diabetes Paternal Grandfather     Heart Disease Paternal Grandfather     Parkinsonism Other        Social History     Socioeconomic History    Marital status: Single     Spouse name: Not on file    Number of children: Not on file    Years of education: Not on file    Highest education level: Not on file   Occupational History    Not on file   Tobacco Use    Smoking status: Never    Smokeless tobacco: Never   Vaping Use    Vaping status: Never Used   Substance and Sexual Activity    Alcohol use: Yes     Alcohol/week: 0.0 standard drinks of alcohol     Comment: occasional    Drug use: No    Sexual activity: Yes     Partners: Male     Birth control/protection: I.U.D.   Other Topics Concern    Not on file   Social History Narrative    Not on file     Social Determinants of

## 2024-08-29 ENCOUNTER — HOSPITAL ENCOUNTER (OUTPATIENT)
Dept: PHYSICAL THERAPY | Facility: CLINIC | Age: 44
Setting detail: THERAPIES SERIES
Discharge: HOME | End: 2024-08-29
Payer: COMMERCIAL

## 2024-08-29 PROCEDURE — 97110 THERAPEUTIC EXERCISES: CPT

## 2024-08-29 PROCEDURE — 97140 MANUAL THERAPY 1/> REGIONS: CPT

## 2024-08-29 NOTE — FLOWSHEET NOTE
[] [facility]  Outpatient Rehabilitation &  Therapy  [address]  [phone]  [phone] [] [facility]  Outpatient Rehabilitation &  Therapy  [address]  P: [phone]  F: [phone] [] [facility]  Outpatient Rehabilitation &  Therapy  [address]  P: [phone]  F: [phone] [x] [facility]  Outpatient Rehabilitation &  Therapy  [address]  [phone]  [phone] [] [facility]  Outpatient Rehabilitation &  Therapy  [address]   P: [phone]  F: [phone]  [] [facility]  Outpatient Rehabilitation &  Therapy  [address]  P: [phone]  F: [phone] [] [facility]  Outpatient Rehabilitation &  Therapy  [address]  P: [phone]  F: [phone] [] [facility]  Outpatient Rehabilitation &  Therapy  [address]  P: [phone]  F: [phone] [] [facility]  Outpatient Rehabilitation &  Therapy  [address]  P: [phone]  F: [phone]  [] [facility] Outpatient Rehabilitation &  Therapy  [address]  P: [phone]  F: [phone]     Physical Therapy Daily Treatment Note    Date:  24   Patient Name:  Roxann BUSH Dontae    :  1980  MRN: [id number]  Physician: Regina Moya MD                                     Insurance: BCBS; Cezar yr; 20vs; auth after eval; 20% coins; 1000/630.71 remaining ded; 6500/4475.27 remaining OOP   Medical Diagnosis: Cervical spondylosis [M47.812]                       Rehab Codes: M54.2  Onset Date: 2013             Next 's appt.: 2024  Visit# / total visits: 3/16    Cancels/No Shows: 0/0    Subjective:  Patient states she woke up with a migraine and increased neck pain today.   Pain:  [x] Yes  []

## 2024-09-05 ENCOUNTER — HOSPITAL ENCOUNTER (OUTPATIENT)
Dept: PHYSICAL THERAPY | Facility: CLINIC | Age: 44
Setting detail: THERAPIES SERIES
Discharge: HOME | End: 2024-09-05
Payer: COMMERCIAL

## 2024-09-05 PROCEDURE — 97110 THERAPEUTIC EXERCISES: CPT

## 2024-09-05 PROCEDURE — 97140 MANUAL THERAPY 1/> REGIONS: CPT

## 2024-09-05 NOTE — FLOWSHEET NOTE
[] [facility]  Outpatient Rehabilitation &  Therapy  [address]  [phone]  [phone] [] [facility]  Outpatient Rehabilitation &  Therapy  [address]  P: [phone]  F: [phone] [] [facility]  Outpatient Rehabilitation &  Therapy  [address]  P: [phone]  F: [phone] [x] [facility]  Outpatient Rehabilitation &  Therapy  [address]  [phone]  [phone] [] [facility]  Outpatient Rehabilitation &  Therapy  [address]   P: [phone]  F: [phone]  [] [facility]  Outpatient Rehabilitation &  Therapy  [address]  P: [phone]  F: [phone] [] [facility]  Outpatient Rehabilitation &  Therapy  [address]  P: [phone]  F: [phone] [] [facility]  Outpatient Rehabilitation &  Therapy  [address]  P: [phone]  F: [phone] [] [facility]  Outpatient Rehabilitation &  Therapy  [address]  P: [phone]  F: [phone]  [] [facility] Outpatient Rehabilitation &  Therapy  [address]  P: [phone]  F: [phone]     Physical Therapy Daily Treatment Note    Date:  24   Patient Name:  Roxann BUSH Dontae    :  1980  MRN: [id number]  Physician: Regina Moya MD                                     Insurance: BCBS; Cezar yr; 20vs; auth after eval; 20% coins; 1000/630.71 remaining ded; 6500/4475.27 remaining OOP   Medical Diagnosis: Cervical spondylosis [M47.812]                       Rehab Codes: M54.2  Onset Date: 2013             Next 's appt.: 24   Visit# / total visits:     Cancels/No Shows: 0/0    Subjective:  Patient is scheduled for nerve block this afternoon.   Pain:  [x] Yes  [] No Location: cervical spine Pain

## 2024-09-19 ENCOUNTER — OFFICE VISIT (OUTPATIENT)
Dept: BEHAVIORAL/MENTAL HEALTH CLINIC | Age: 44
End: 2024-09-19
Payer: COMMERCIAL

## 2024-09-19 DIAGNOSIS — F41.1 GENERALIZED ANXIETY DISORDER: Primary | ICD-10-CM

## 2024-09-19 PROCEDURE — 90834 PSYTX W PT 45 MINUTES: CPT | Performed by: COUNSELOR

## 2024-09-19 ASSESSMENT — ANXIETY QUESTIONNAIRES
GAD7 TOTAL SCORE: 18
7. FEELING AFRAID AS IF SOMETHING AWFUL MIGHT HAPPEN: NEARLY EVERY DAY
5. BEING SO RESTLESS THAT IT IS HARD TO SIT STILL: NEARLY EVERY DAY
2. NOT BEING ABLE TO STOP OR CONTROL WORRYING: NEARLY EVERY DAY
1. FEELING NERVOUS, ANXIOUS, OR ON EDGE: SEVERAL DAYS
IF YOU CHECKED OFF ANY PROBLEMS ON THIS QUESTIONNAIRE, HOW DIFFICULT HAVE THESE PROBLEMS MADE IT FOR YOU TO DO YOUR WORK, TAKE CARE OF THINGS AT HOME, OR GET ALONG WITH OTHER PEOPLE: VERY DIFFICULT
3. WORRYING TOO MUCH ABOUT DIFFERENT THINGS: NEARLY EVERY DAY
6. BECOMING EASILY ANNOYED OR IRRITABLE: MORE THAN HALF THE DAYS
4. TROUBLE RELAXING: NEARLY EVERY DAY

## 2024-09-19 ASSESSMENT — PATIENT HEALTH QUESTIONNAIRE - PHQ9
1. LITTLE INTEREST OR PLEASURE IN DOING THINGS: NOT AT ALL
SUM OF ALL RESPONSES TO PHQ9 QUESTIONS 1 & 2: 1
6. FEELING BAD ABOUT YOURSELF - OR THAT YOU ARE A FAILURE OR HAVE LET YOURSELF OR YOUR FAMILY DOWN: SEVERAL DAYS
SUM OF ALL RESPONSES TO PHQ QUESTIONS 1-9: 15
2. FEELING DOWN, DEPRESSED OR HOPELESS: SEVERAL DAYS
9. THOUGHTS THAT YOU WOULD BE BETTER OFF DEAD, OR OF HURTING YOURSELF: NOT AT ALL
8. MOVING OR SPEAKING SO SLOWLY THAT OTHER PEOPLE COULD HAVE NOTICED. OR THE OPPOSITE, BEING SO FIGETY OR RESTLESS THAT YOU HAVE BEEN MOVING AROUND A LOT MORE THAN USUAL: MORE THAN HALF THE DAYS
5. POOR APPETITE OR OVEREATING: MORE THAN HALF THE DAYS
4. FEELING TIRED OR HAVING LITTLE ENERGY: NEARLY EVERY DAY
SUM OF ALL RESPONSES TO PHQ QUESTIONS 1-9: 15
7. TROUBLE CONCENTRATING ON THINGS, SUCH AS READING THE NEWSPAPER OR WATCHING TELEVISION: NEARLY EVERY DAY
10. IF YOU CHECKED OFF ANY PROBLEMS, HOW DIFFICULT HAVE THESE PROBLEMS MADE IT FOR YOU TO DO YOUR WORK, TAKE CARE OF THINGS AT HOME, OR GET ALONG WITH OTHER PEOPLE: VERY DIFFICULT
3. TROUBLE FALLING OR STAYING ASLEEP: NEARLY EVERY DAY

## 2024-09-26 ENCOUNTER — HOSPITAL ENCOUNTER (OUTPATIENT)
Dept: PHYSICAL THERAPY | Facility: CLINIC | Age: 44
Setting detail: THERAPIES SERIES
Discharge: HOME | End: 2024-09-26
Payer: COMMERCIAL

## 2024-09-26 PROCEDURE — 97110 THERAPEUTIC EXERCISES: CPT

## 2024-09-26 PROCEDURE — 97140 MANUAL THERAPY 1/> REGIONS: CPT

## 2024-10-03 ENCOUNTER — HOSPITAL ENCOUNTER (OUTPATIENT)
Dept: PHYSICAL THERAPY | Facility: CLINIC | Age: 44
Setting detail: THERAPIES SERIES
Discharge: HOME | End: 2024-10-03
Payer: COMMERCIAL

## 2024-10-03 ENCOUNTER — TELEMEDICINE (OUTPATIENT)
Dept: BEHAVIORAL/MENTAL HEALTH CLINIC | Age: 44
End: 2024-10-03
Payer: COMMERCIAL

## 2024-10-03 DIAGNOSIS — F41.1 GENERALIZED ANXIETY DISORDER: Primary | ICD-10-CM

## 2024-10-03 PROCEDURE — 97140 MANUAL THERAPY 1/> REGIONS: CPT

## 2024-10-03 PROCEDURE — 97110 THERAPEUTIC EXERCISES: CPT

## 2024-10-03 PROCEDURE — 90834 PSYTX W PT 45 MINUTES: CPT | Performed by: COUNSELOR

## 2024-10-03 ASSESSMENT — PATIENT HEALTH QUESTIONNAIRE - PHQ9
SUM OF ALL RESPONSES TO PHQ QUESTIONS 1-9: 15
10. IF YOU CHECKED OFF ANY PROBLEMS, HOW DIFFICULT HAVE THESE PROBLEMS MADE IT FOR YOU TO DO YOUR WORK, TAKE CARE OF THINGS AT HOME, OR GET ALONG WITH OTHER PEOPLE: SOMEWHAT DIFFICULT
SUM OF ALL RESPONSES TO PHQ QUESTIONS 1-9: 15
SUM OF ALL RESPONSES TO PHQ QUESTIONS 1-9: 15
2. FEELING DOWN, DEPRESSED OR HOPELESS: SEVERAL DAYS
4. FEELING TIRED OR HAVING LITTLE ENERGY: MORE THAN HALF THE DAYS
SUM OF ALL RESPONSES TO PHQ QUESTIONS 1-9: 15
3. TROUBLE FALLING OR STAYING ASLEEP: SEVERAL DAYS
10. IF YOU CHECKED OFF ANY PROBLEMS, HOW DIFFICULT HAVE THESE PROBLEMS MADE IT FOR YOU TO DO YOUR WORK, TAKE CARE OF THINGS AT HOME, OR GET ALONG WITH OTHER PEOPLE: SOMEWHAT DIFFICULT
8. MOVING OR SPEAKING SO SLOWLY THAT OTHER PEOPLE COULD HAVE NOTICED. OR THE OPPOSITE, BEING SO FIGETY OR RESTLESS THAT YOU HAVE BEEN MOVING AROUND A LOT MORE THAN USUAL: MORE THAN HALF THE DAYS
6. FEELING BAD ABOUT YOURSELF - OR THAT YOU ARE A FAILURE OR HAVE LET YOURSELF OR YOUR FAMILY DOWN: MORE THAN HALF THE DAYS
6. FEELING BAD ABOUT YOURSELF - OR THAT YOU ARE A FAILURE OR HAVE LET YOURSELF OR YOUR FAMILY DOWN: MORE THAN HALF THE DAYS
9. THOUGHTS THAT YOU WOULD BE BETTER OFF DEAD, OR OF HURTING YOURSELF: NOT AT ALL
2. FEELING DOWN, DEPRESSED OR HOPELESS: SEVERAL DAYS
SUM OF ALL RESPONSES TO PHQ9 QUESTIONS 1 & 2: 3
8. MOVING OR SPEAKING SO SLOWLY THAT OTHER PEOPLE COULD HAVE NOTICED. OR THE OPPOSITE - BEING SO FIDGETY OR RESTLESS THAT YOU HAVE BEEN MOVING AROUND A LOT MORE THAN USUAL: MORE THAN HALF THE DAYS
3. TROUBLE FALLING OR STAYING ASLEEP: SEVERAL DAYS
4. FEELING TIRED OR HAVING LITTLE ENERGY: MORE THAN HALF THE DAYS
9. THOUGHTS THAT YOU WOULD BE BETTER OFF DEAD, OR OF HURTING YOURSELF: NOT AT ALL
7. TROUBLE CONCENTRATING ON THINGS, SUCH AS READING THE NEWSPAPER OR WATCHING TELEVISION: MORE THAN HALF THE DAYS
5. POOR APPETITE OR OVEREATING: NEARLY EVERY DAY
SUM OF ALL RESPONSES TO PHQ9 QUESTIONS 1 & 2: 3
7. TROUBLE CONCENTRATING ON THINGS, SUCH AS READING THE NEWSPAPER OR WATCHING TELEVISION: MORE THAN HALF THE DAYS
1. LITTLE INTEREST OR PLEASURE IN DOING THINGS: MORE THAN HALF THE DAYS
5. POOR APPETITE OR OVEREATING: NEARLY EVERY DAY
1. LITTLE INTEREST OR PLEASURE IN DOING THINGS: MORE THAN HALF THE DAYS

## 2024-10-03 NOTE — PROGRESS NOTES
ADULT BEHAVIORAL HEALTH FOLLOW UP  Jessica Lomeli       Visit Date: 10/3/2024   Time of appointment:  11:05a   Time spent with Patient: 41 minutes.   This is patient's 18th appointment.    Reason for Consult:  Follow-up     Referring Provider/PCP:    No ref. provider found  Oxana Nance, APRN - CNP      Pt provided informed consent for the behavioral health program. Discussed with patient model of service to include the limits of confidentiality (i.e. abuse reporting, suicide intervention, etc.) and short-term intervention focused approach.  Pt indicated understanding.    Roxann BUSH Dontae, was evaluated through a synchronous (real-time) audio-video encounter. The patient (or guardian if applicable) is aware that this is a billable service, which includes applicable co-pays. This Virtual Visit was conducted with patient's (and/or legal guardian's) consent. Patient identification was verified, and a caregiver was present when appropriate.   The patient was located at Home: [address] [place], [place] [id number]  Provider was located at Facility (Appt Dept): [address] [place] [address]  [address]  [place] [address]  Confirm you are appropriately licensed, registered, or certified to deliver care in the [facility] where the patient is located as indicated above. If you are not or unsure, please re-schedule the visit: Yes, I confirm.      Total time spent for this encounter:  41 mins    --Jessica Lomeli on 10/3/2024 at 11:08 AM    An electronic signature was used to authenticate this note.    BUSTER Hackett is a 43 y.o. female who presents for follow up of anxiety. She continued processing how she is handling break up. Therapist provided active listening and validated feelings. She processed how her supportive sx has helped her. She reported the guided imagery has helped her fall asleep quicker. She reported despite this she is still feeling more fatigue. She reported she could get a sleep machine for her sleep

## 2024-10-03 NOTE — FLOWSHEET NOTE
exercise program as demonstrated by performance with correct form without cues.  []  []  []     5.  Increase scapular muscle strength to at least 4+/5 to show ability for postural correction during work  []  []  []     6.   []  []  []            Date Addressed: TBD       LTG: To be met in 16 treatments        ? Strength: increase right shoulder ABD strength to at least 5/5 []  []  []     2.  ? Function: decrease NDI score to less than 19/50 []  []  []     3.   []  []  []     4.   [] [] []    5.   [] [] []    6.   [] [] []           Pt goals:  decrease pain in head and neck  [] [] []          Pt. Education:  [x] Yes  [] No  [x] Reviewed Prior HEP/Ed  Method of Education: [x] Verbal  [x] Demo  [x] Written  Comprehension of Education:  [x] Verbalizes understanding.  [x] Demonstrates understanding.  [] Needs review.  [x] Demonstrates/verbalizes HEP/Ed previously given.     Access Code: [email or web address]  URL: [email or web address]  Date: 08/22/2024  Prepared by: Justin    Exercises  - Supine Chin Tuck  - 1 x daily - 7 x weekly - 2 sets - 10 reps  - Seated Upper Trapezius Stretch  - 1 x daily - 7 x weekly - 2 sets - 30 seconds hold  - Seated Scapular Retraction  - 1 x daily - 7 x weekly - 2 sets - 10 reps  - Shoulder extension with resistance - Neutral  - 1 x daily - 7 x weekly - 2 sets - 10 reps  - Standing Shoulder Row with Anchored Resistance  - 1 x daily - 7 x weekly - 2 sets - 10 reps  - Doorway Pec Stretch at 90 Degrees Abduction  - 1 x daily - 7 x weekly - 2 sets - 30 seconds hold  - Standing Cervical Sidebending AROM  - 1-2 x daily - 7 x weekly - 1-2 sets - 10 reps  - Standing Cervical Rotation AROM  - 1-2 x daily - 7 x weekly - 1-2 sets - 10 reps  - Standing Cervical Flexion AROM  - 1-2 x daily - 7 x weekly - 1-2 sets - 10 reps  - Standing Cervical Extension AROM  - 1-2 x daily - 7 x weekly - 1-2 sets - 10 reps    Plan: [x] Continue current frequency toward long and short term goals.    [x] Specific Instructions

## 2024-10-10 ENCOUNTER — HOSPITAL ENCOUNTER (OUTPATIENT)
Dept: PHYSICAL THERAPY | Facility: CLINIC | Age: 44
Setting detail: THERAPIES SERIES
End: 2024-10-10
Payer: COMMERCIAL

## 2024-10-14 ENCOUNTER — OFFICE VISIT (OUTPATIENT)
Dept: PAIN MANAGEMENT | Age: 44
End: 2024-10-14
Payer: COMMERCIAL

## 2024-10-14 VITALS — BODY MASS INDEX: 29.03 KG/M2 | HEIGHT: 69 IN | WEIGHT: 196 LBS

## 2024-10-14 DIAGNOSIS — M47.812 CERVICAL SPONDYLOSIS: Primary | ICD-10-CM

## 2024-10-14 PROCEDURE — 99214 OFFICE O/P EST MOD 30 MIN: CPT | Performed by: PAIN MEDICINE

## 2024-10-14 NOTE — PROGRESS NOTES
worried whether our food would run out before we got money to buy more.: Never True     Within the past 12 months the food we bought just didn't last and we didn't have money to get more.: Never True   Transportation Needs: Unknown (6/13/2024)    PRAPARE - Transportation     Lack of Transportation (Medical): Not on file     Lack of Transportation (Non-Medical): No   Physical Activity: Not on file   Stress: Not on file   Social Connections: Not on file   Intimate Partner Violence: Not on file   Housing Stability: Unknown (6/13/2024)    Housing Stability Vital Sign     Unable to Pay for Housing in the Last Year: Not on file     Number of Places Lived in the Last Year: Not on file     Unstable Housing in the Last Year: No       Review of Systems:  Review of Systems   Constitutional: Negative for fever.   Musculoskeletal:  Positive for neck pain.   Neurological:  Negative for numbness, tingling and weakness.       Physical Exam:  Ht 1.753 m (5' 9\")   Wt 88.9 kg (196 lb)   BMI 28.94 kg/m²     Physical Exam    Constitutional:       Appearance: Normal appearance.   Pulmonary:      Effort: Pulmonary effort is normal.   Neurological:      Mental Status: Alert.   Psychiatric:         Attention and Perception: Attention and perception normal.         Mood and Affect: Mood and affect normal.       Record/Diagnostics Review:    As above    Orders:  Orders Placed This Encounter   Procedures    INJ DX/THER AGNT PARAVERT FACET JOINT, CERV/THORAC, 2ND LEVEL    INJ DX/THER AGNT PARAVERT FACET JOINT, CERV/THORAC, 1ST LEVEL       Assessment:  1. Cervical spondylosis        Treatment Plan:  DISCUSSION: Treatment options discussed with patient and all questions answered to patient's satisfaction.  Risks, benefits, and alternatives of treatment discussed.    OARRS Review: Reviewed and acceptable for medications prescribed.  TREATMENT OPTIONS:     Discussed different treatment options including continued conservative care such as

## 2024-10-17 ENCOUNTER — OFFICE VISIT (OUTPATIENT)
Dept: BEHAVIORAL/MENTAL HEALTH CLINIC | Age: 44
End: 2024-10-17
Payer: COMMERCIAL

## 2024-10-17 DIAGNOSIS — F41.1 GENERALIZED ANXIETY DISORDER: Primary | ICD-10-CM

## 2024-10-17 PROCEDURE — 90837 PSYTX W PT 60 MINUTES: CPT | Performed by: COUNSELOR

## 2024-10-17 ASSESSMENT — PATIENT HEALTH QUESTIONNAIRE - PHQ9
SUM OF ALL RESPONSES TO PHQ QUESTIONS 1-9: 18
SUM OF ALL RESPONSES TO PHQ QUESTIONS 1-9: 18
9. THOUGHTS THAT YOU WOULD BE BETTER OFF DEAD, OR OF HURTING YOURSELF: NOT AT ALL
5. POOR APPETITE OR OVEREATING: NEARLY EVERY DAY
8. MOVING OR SPEAKING SO SLOWLY THAT OTHER PEOPLE COULD HAVE NOTICED. OR THE OPPOSITE, BEING SO FIGETY OR RESTLESS THAT YOU HAVE BEEN MOVING AROUND A LOT MORE THAN USUAL: NEARLY EVERY DAY
SUM OF ALL RESPONSES TO PHQ QUESTIONS 1-9: 18
4. FEELING TIRED OR HAVING LITTLE ENERGY: NEARLY EVERY DAY
3. TROUBLE FALLING OR STAYING ASLEEP: NEARLY EVERY DAY
6. FEELING BAD ABOUT YOURSELF - OR THAT YOU ARE A FAILURE OR HAVE LET YOURSELF OR YOUR FAMILY DOWN: MORE THAN HALF THE DAYS
7. TROUBLE CONCENTRATING ON THINGS, SUCH AS READING THE NEWSPAPER OR WATCHING TELEVISION: MORE THAN HALF THE DAYS
SUM OF ALL RESPONSES TO PHQ QUESTIONS 1-9: 18
1. LITTLE INTEREST OR PLEASURE IN DOING THINGS: SEVERAL DAYS
SUM OF ALL RESPONSES TO PHQ9 QUESTIONS 1 & 2: 2
10. IF YOU CHECKED OFF ANY PROBLEMS, HOW DIFFICULT HAVE THESE PROBLEMS MADE IT FOR YOU TO DO YOUR WORK, TAKE CARE OF THINGS AT HOME, OR GET ALONG WITH OTHER PEOPLE: SOMEWHAT DIFFICULT
2. FEELING DOWN, DEPRESSED OR HOPELESS: SEVERAL DAYS

## 2024-10-17 NOTE — PROGRESS NOTES
Discussed and set plan for behavioral activation, Trained in relaxation strategies, Trained in improving communication skills, Discussed potential treatments for  depression, anxiety, and stress, Provided education, Discussed self-care (sleep, nutrition, rewarding activities, social support, exercise), Motivational Interviewing to determine importance and readiness for change, Discussed potential barriers to change, Supportive techniques, Identified maladaptive thoughts, and Motivational Interviewing to target pt's barriers to goals.     PLAN  Encouraged to use grounding tools and guided imagery. Encouraged to work on using coping skills to better manage unhealthy behavioral patterns.     INTERACTIVE COMPLEXITY  Is interactive complexity present?  No  Reason:    Additional Supporting Information:  N/A     Electronically signed by Jessica Lomeli on 10/17/2024 at 11:11 AM

## 2024-10-31 ENCOUNTER — HOSPITAL ENCOUNTER (OUTPATIENT)
Dept: PHYSICAL THERAPY | Facility: CLINIC | Age: 44
Setting detail: THERAPIES SERIES
Discharge: HOME | End: 2024-10-31

## 2024-10-31 ENCOUNTER — OFFICE VISIT (OUTPATIENT)
Dept: BEHAVIORAL/MENTAL HEALTH CLINIC | Age: 44
End: 2024-10-31
Payer: COMMERCIAL

## 2024-10-31 DIAGNOSIS — F41.1 GENERALIZED ANXIETY DISORDER: Primary | ICD-10-CM

## 2024-10-31 PROCEDURE — 97110 THERAPEUTIC EXERCISES: CPT

## 2024-10-31 PROCEDURE — 90834 PSYTX W PT 45 MINUTES: CPT | Performed by: COUNSELOR

## 2024-10-31 NOTE — FLOWSHEET NOTE
pain to 0/10 []  [x]  []      ? ROM: increase cervical flexion to at least 50 degree to show decreased post-cervical muscle tension []  [x]  []  40 deg flexion and extension  32 bilateral lateral flexion    ? Function: decrease NDI score to less than 22/50 [x]  []  []     Patient to be independent with home exercise program as demonstrated by performance with correct form without cues.  []  []  [x]     Increase scapular muscle strength to at least 4+/5 to show ability for postural correction during work  [x]  []  []  4+/5 overall                 Date Addressed: TBD       LTG: To be met in 16 treatments       ? Strength: increase right shoulder ABD strength to at least 5/5 []  []  []     ? Function: decrease NDI score to less than 19/50 []  []  []            Pt goals:  decrease pain in head and neck  [] [] []          Pt. Education:  [x] Yes  [] No  [x] Reviewed Prior HEP/Ed  Method of Education: [x] Verbal  [x] Demo  [x] Written  Comprehension of Education:  [x] Verbalizes understanding.  [x] Demonstrates understanding.  [] Needs review.  [x] Demonstrates/verbalizes HEP/Ed previously given.     Access Code: [email or web address]  URL: [email or web address]  Date: 08/22/2024  Prepared by: Justin    Exercises  - Supine Chin Tuck  - 1 x daily - 7 x weekly - 2 sets - 10 reps  - Seated Upper Trapezius Stretch  - 1 x daily - 7 x weekly - 2 sets - 30 seconds hold  - Seated Scapular Retraction  - 1 x daily - 7 x weekly - 2 sets - 10 reps  - Shoulder extension with resistance - Neutral  - 1 x daily - 7 x weekly - 2 sets - 10 reps  - Standing Shoulder Row with Anchored Resistance  - 1 x daily - 7 x weekly - 2 sets - 10 reps  - Doorway Pec Stretch at 90 Degrees Abduction  - 1 x daily - 7 x weekly - 2 sets - 30 seconds hold  - Standing Cervical Sidebending AROM  - 1-2 x daily - 7 x weekly - 1-2 sets - 10 reps  - Standing Cervical Rotation AROM  - 1-2 x daily - 7 x weekly - 1-2 sets - 10 reps  - Standing Cervical Flexion AROM  - 1-2 x

## 2024-10-31 NOTE — PROGRESS NOTES
[] [facility]  Outpatient Rehabilitation &  Therapy  [address]  [phone]  [phone] [] [facility]  Outpatient Rehabilitation &  Therapy  [address]  P: [phone]  F: [phone] [] [facility]  Outpatient Rehabilitation &  Therapy  [address]  P: [phone]  F: [phone] [x] [facility]  Outpatient Rehabilitation &  Therapy  [address]  [phone]  [phone] [] [facility]  Outpatient Rehabilitation &  Therapy  [address]   P: [phone]  F: [phone]  [] [facility]  Outpatient Rehabilitation &  Therapy  [address]  P: [phone]  F: [phone] [] [facility]  Outpatient Rehabilitation &  Therapy  [address]  P: [phone]  F: [phone] [] [facility]  Outpatient Rehabilitation &  Therapy  [address]  P: [phone]  F: [phone] [] [facility]  Outpatient Rehabilitation &  Therapy  [address]  P: [phone]  F: [phone]  [] [facility] Outpatient Rehabilitation &  Therapy  [address]  P: [phone]  F: [phone]     Physical Therapy Progress Note    Date: 10/31/2024      Patient: Roxann BUSH Dontae  : 1980  MRN: [id number]    Physician: Regina Moya MD                                     Insurance: BCBS; Cezar yr; 20vs; auth after eval; 20% coins; 1000/630.71 remaining ded; 6500/4475.27 remaining OOP   Medical Diagnosis: Cervical spondylosis [M47.812]                       Rehab Codes: M54.2  Onset Date: 2013             Next 's appt.: 2024  Total visits attended: 7  Cancels/No shows: 0/0  Date range of services: 2024 to 10/31/2024      Subjective:   Pain:  [x] Yes  [] No   Location: cervical spine          Pain

## 2024-10-31 NOTE — PROGRESS NOTES
ADULT BEHAVIORAL HEALTH FOLLOW UP  Jessica Lomeli       Visit Date: 10/31/2024   Time of appointment:  11:03a   Time spent with Patient: 45 minutes.   This is patient's  20th  appointment.    Reason for Consult:  Follow-up     Referring Provider/PCP:    No ref. provider found  Oxana Nance, APRN - CNP      Pt provided informed consent for the behavioral health program. Discussed with patient model of service to include the limits of confidentiality (i.e. abuse reporting, suicide intervention, etc.) and short-term intervention focused approach.  Pt indicated understanding.    BUSTER Hackett is a 43 y.o. female who presents for follow up of anxiety. She processed past couple weeks and how she is managing. She continued processing work stressors and how she is trying to manage conflicts. Therapist provided active listening and validated feelings. She processed how she handled attending a halloween party and how she handled . Therapist confronted maladaptive thinking and difficulty advocating for her needs. She continued processing home stressors with mom and how she is trying to work through relationship conflicts. Therapist utilized MI to assess insight into how to overcome barriers to behavioral changes. She reported she is still working on figuring out her health and processed how she feels after finding out she has POTS and an immune disorder. Therapist validated feelings.     Previous Recommendations: Encouraged to use grounding tools and guided imagery. Encouraged to work on using coping skills to better manage unhealthy behavioral patterns.             MENTAL STATUS EXAM  Mood was within normal limits with calm affect.   Suicidal ideation was denied.   Homicidal ideation was denied.   Hygiene was fair .  Dress was appropriate.   Behavior was Within Normal Limits with No observation or self-report of difficulties ambulating.   Attitude was Cooperative.  Eye-contact was fair.  Speech: rate -

## 2024-11-14 ENCOUNTER — TELEPHONE (OUTPATIENT)
Dept: PAIN MANAGEMENT | Age: 44
End: 2024-11-14

## 2024-11-14 ENCOUNTER — HOSPITAL ENCOUNTER (OUTPATIENT)
Dept: PAIN MANAGEMENT | Facility: CLINIC | Age: 44
Discharge: HOME | End: 2024-11-14
Payer: COMMERCIAL

## 2024-11-14 VITALS
HEIGHT: 68 IN | DIASTOLIC BLOOD PRESSURE: 61 MMHG | HEART RATE: 100 BPM | SYSTOLIC BLOOD PRESSURE: 118 MMHG | RESPIRATION RATE: 19 BRPM | BODY MASS INDEX: 30.31 KG/M2 | TEMPERATURE: 98 F | OXYGEN SATURATION: 98 % | WEIGHT: 200 LBS

## 2024-11-14 DIAGNOSIS — R52 PAIN MANAGEMENT: ICD-10-CM

## 2024-11-14 LAB — HCG, PREGNANCY URINE (POC): NEGATIVE

## 2024-11-14 PROCEDURE — 81025 URINE PREGNANCY TEST: CPT

## 2024-11-14 ASSESSMENT — PAIN - FUNCTIONAL ASSESSMENT
PAIN_FUNCTIONAL_ASSESSMENT: PREVENTS OR INTERFERES SOME ACTIVE ACTIVITIES AND ADLS
PAIN_FUNCTIONAL_ASSESSMENT: 0-10

## 2024-11-14 ASSESSMENT — PAIN DESCRIPTION - DESCRIPTORS: DESCRIPTORS: THROBBING;SHARP

## 2024-11-14 NOTE — TELEPHONE ENCOUNTER
Pt state she was prescribed ear drops, nothing orally. Completed ear drops on Monday 11/11/24.     Ok to reschedule?

## 2024-11-14 NOTE — TELEPHONE ENCOUNTER
Pt OR was cancelled today due to recent ear infection.     I called pt to inquire about antibiotics.   Ok to r/s if/when antibiotics are complete?

## 2024-11-14 NOTE — DISCHARGE INSTRUCTIONS
You have received a sedative/anesthetic therefore you should not consume any alcoholic beverages for 24 hours.  Do not drive or operate machinery for 24 hours.   Do not take a tub bath for 72 hours after procedure (this includes hot tubs).  You may shower, but avoid hot water to injection site.   Avoid strenuous activity TODAY especially if you experience dizziness.   Remove band-aid the next day.    Wash off any residual iodine 24 hours from today.   Do not use heat, heating pad, or any other heating device over the injection site for 3 days after the procedure.    If you experience pain after your procedure, you may continue with your current pain medication as prescribed.  (DO NOT INCREASE YOUR PAIN MEDICATION WITHOUT TALKING TO DOCTOR)  Soreness and pain at injection site is common, may use ice to reduce soreness.    Please complete pain diary as instructed. The office staff will call you to discuss the results of the procedure within 24 hours, please call the office if you do not hear from them.      Call [facility] at [phone] if you experience:   Fever, chills or temperature over 100    Vomiting, headache, persistent stiff neck, nausea or blurred vision   Difficulty urinating or unable to urinate within 8 hours   Increase in weakness, numbness or loss of function of limbs  Increased redness, swelling or drainage at the injection site

## 2024-11-21 ENCOUNTER — OFFICE VISIT (OUTPATIENT)
Dept: BEHAVIORAL/MENTAL HEALTH CLINIC | Age: 44
End: 2024-11-21
Payer: COMMERCIAL

## 2024-11-21 DIAGNOSIS — F41.1 GENERALIZED ANXIETY DISORDER: Primary | ICD-10-CM

## 2024-11-21 PROCEDURE — 90834 PSYTX W PT 45 MINUTES: CPT | Performed by: COUNSELOR

## 2024-11-21 ASSESSMENT — PATIENT HEALTH QUESTIONNAIRE - PHQ9
SUM OF ALL RESPONSES TO PHQ QUESTIONS 1-9: 12
7. TROUBLE CONCENTRATING ON THINGS, SUCH AS READING THE NEWSPAPER OR WATCHING TELEVISION: SEVERAL DAYS
10. IF YOU CHECKED OFF ANY PROBLEMS, HOW DIFFICULT HAVE THESE PROBLEMS MADE IT FOR YOU TO DO YOUR WORK, TAKE CARE OF THINGS AT HOME, OR GET ALONG WITH OTHER PEOPLE: NOT DIFFICULT AT ALL
SUM OF ALL RESPONSES TO PHQ QUESTIONS 1-9: 12
6. FEELING BAD ABOUT YOURSELF - OR THAT YOU ARE A FAILURE OR HAVE LET YOURSELF OR YOUR FAMILY DOWN: SEVERAL DAYS
4. FEELING TIRED OR HAVING LITTLE ENERGY: NEARLY EVERY DAY
SUM OF ALL RESPONSES TO PHQ QUESTIONS 1-9: 12
5. POOR APPETITE OR OVEREATING: NEARLY EVERY DAY
SUM OF ALL RESPONSES TO PHQ9 QUESTIONS 1 & 2: 2
8. MOVING OR SPEAKING SO SLOWLY THAT OTHER PEOPLE COULD HAVE NOTICED. OR THE OPPOSITE, BEING SO FIGETY OR RESTLESS THAT YOU HAVE BEEN MOVING AROUND A LOT MORE THAN USUAL: SEVERAL DAYS
1. LITTLE INTEREST OR PLEASURE IN DOING THINGS: SEVERAL DAYS
SUM OF ALL RESPONSES TO PHQ QUESTIONS 1-9: 12
9. THOUGHTS THAT YOU WOULD BE BETTER OFF DEAD, OR OF HURTING YOURSELF: NOT AT ALL
2. FEELING DOWN, DEPRESSED OR HOPELESS: SEVERAL DAYS
3. TROUBLE FALLING OR STAYING ASLEEP: SEVERAL DAYS

## 2024-11-21 NOTE — PROGRESS NOTES
ADULT BEHAVIORAL HEALTH FOLLOW UP  Jessica Lomeli       Visit Date: 11/21/2024   Time of appointment:  11:04a   Time spent with Patient: 49 minutes.   This is patient's  21st  appointment.    Reason for Consult:  Follow-up     Referring Provider/PCP:    No ref. provider found  Oxana Nance, APRN - CNP      Pt provided informed consent for the behavioral health program. Discussed with patient model of service to include the limits of confidentiality (i.e. abuse reporting, suicide intervention, etc.) and short-term intervention focused approach.  Pt indicated understanding.    BUSTER Hackett is a 43 y.o. female who presents for follow up of anxiety. She processed past couple weeks. She reported she had to take mom to the ER and processed how she handled this. Therapist provided active listening and validated feelings. She reported she has been busy working but is trying to make time on the weekends to do self-care and get out of the house. She identified this has been helpful. Therapist validated progress. She continued processing how she is managing breakup and acknowledged she is missing ex. Therapist utilized MI to assess insight into barriers to processing emotions. She continued processing relationship conflicts and how she is managing. Therapist provided psychoeducation on unhealthy vs healthy relationships characteristics. Therapist encouraged her to confront maladaptive thinking and remind herself of what she is in control of. Communication skills were reviewed.     Previous Recommendations: Encouraged to continue to work on boundary and communication tools. Encouraged to continue to work on self-care and coping skills.             MENTAL STATUS EXAM  Mood was within normal limits with calm affect.   Suicidal ideation was denied.   Homicidal ideation was denied.   Hygiene was fair .  Dress was appropriate.   Behavior was Within Normal Limits with No observation or self-report of difficulties 
no

## 2024-12-05 ENCOUNTER — HOSPITAL ENCOUNTER (OUTPATIENT)
Dept: PAIN MANAGEMENT | Facility: CLINIC | Age: 44
Discharge: HOME | End: 2024-12-05
Payer: COMMERCIAL

## 2024-12-05 VITALS
SYSTOLIC BLOOD PRESSURE: 110 MMHG | HEART RATE: 75 BPM | WEIGHT: 200 LBS | OXYGEN SATURATION: 100 % | HEIGHT: 68 IN | DIASTOLIC BLOOD PRESSURE: 67 MMHG | TEMPERATURE: 97.9 F | BODY MASS INDEX: 30.31 KG/M2 | RESPIRATION RATE: 12 BRPM

## 2024-12-05 DIAGNOSIS — R52 PAIN MANAGEMENT: ICD-10-CM

## 2024-12-05 PROCEDURE — 64490 INJ PARAVERT F JNT C/T 1 LEV: CPT | Performed by: PAIN MEDICINE

## 2024-12-05 PROCEDURE — 64491 INJ PARAVERT F JNT C/T 2 LEV: CPT | Performed by: PAIN MEDICINE

## 2024-12-05 PROCEDURE — 64490 INJ PARAVERT F JNT C/T 1 LEV: CPT

## 2024-12-05 PROCEDURE — 64491 INJ PARAVERT F JNT C/T 2 LEV: CPT

## 2024-12-05 PROCEDURE — 6360000002 HC RX W HCPCS: Performed by: PAIN MEDICINE

## 2024-12-05 RX ORDER — MIDAZOLAM HYDROCHLORIDE 2 MG/2ML
INJECTION, SOLUTION INTRAMUSCULAR; INTRAVENOUS
Status: COMPLETED | OUTPATIENT
Start: 2024-12-05 | End: 2024-12-05

## 2024-12-05 RX ORDER — LIDOCAINE HYDROCHLORIDE 5 MG/ML
INJECTION, SOLUTION INFILTRATION; INTRAVENOUS
Status: COMPLETED | OUTPATIENT
Start: 2024-12-05 | End: 2024-12-05

## 2024-12-05 RX ORDER — BUPIVACAINE HYDROCHLORIDE 2.5 MG/ML
INJECTION, SOLUTION EPIDURAL; INFILTRATION; INTRACAUDAL
Status: COMPLETED | OUTPATIENT
Start: 2024-12-05 | End: 2024-12-05

## 2024-12-05 RX ADMIN — BUPIVACAINE HYDROCHLORIDE 3 ML: 2.5 INJECTION, SOLUTION EPIDURAL; INFILTRATION; INTRACAUDAL; PERINEURAL at 08:23

## 2024-12-05 RX ADMIN — MIDAZOLAM HYDROCHLORIDE 1 MG: 1 INJECTION, SOLUTION INTRAMUSCULAR; INTRAVENOUS at 08:13

## 2024-12-05 RX ADMIN — LIDOCAINE HYDROCHLORIDE 2 ML: 5 INJECTION, SOLUTION INFILTRATION at 08:19

## 2024-12-05 ASSESSMENT — PAIN DESCRIPTION - DESCRIPTORS: DESCRIPTORS: SHARP;THROBBING

## 2024-12-05 ASSESSMENT — PAIN - FUNCTIONAL ASSESSMENT
PAIN_FUNCTIONAL_ASSESSMENT: NONE - DENIES PAIN
PAIN_FUNCTIONAL_ASSESSMENT: PREVENTS OR INTERFERES WITH ALL ACTIVE AND SOME PASSIVE ACTIVITIES
PAIN_FUNCTIONAL_ASSESSMENT: 0-10

## 2024-12-05 NOTE — DISCHARGE INSTRUCTIONS
You have received a sedative/anesthetic therefore you should not consume any alcoholic beverages for 24 hours.  Do not drive or operate machinery for 24 hours.   Do not take a tub bath for 72 hours after procedure (this includes hot tubs).  You may shower, but avoid hot water to injection site.   Avoid strenuous activity TODAY especially if you experience dizziness.   Remove band-aid the next day.    Wash off any residual iodine 24 hours from today.   Do not use heat, heating pad, or any other heating device over the injection site for 3 days after the procedure.    If you experience pain after your procedure, you may continue with your current pain medication as prescribed.  (DO NOT INCREASE YOUR PAIN MEDICATION WITHOUT TALKING TO DOCTOR)  Soreness and pain at injection site is common, may use ice to reduce soreness.    Please complete pain diary as instructed. The office staff will call you to discuss the results of the procedure within 24 hours, please call the office if you do not hear from them.      Call [facility] at [phone] if you experience:   Fever, chills or temperature over 100    Vomiting, headache, persistent stiff neck, nausea or blurred vision   Difficulty urinating or unable to urinate within 8 hours   Increase in weakness, numbness or loss of function of limbs  Increased redness, swelling or drainage at the injection site

## 2024-12-05 NOTE — H&P
Seasonal allergies         Current Outpatient Medications:     Cholecalciferol (VITAMIN D) 125 MCG (5000 UT) CAPS, Take 1.25 mg by mouth, Disp: , Rfl:     betamethasone valerate (VALISONE) 0.1 % cream, Apply 1 Application topically 2 times daily, Disp: , Rfl:     ALPRAZolam (XANAX) 1 MG tablet, TAKE 1 TABLET BY MOUTH TWICE DAILY FOR 30 DAYS, Disp: , Rfl:     magnesium gluconate (MAGONATE) 500 MG tablet, Take 1 tablet by mouth 2 times daily, Disp: , Rfl:     Misc Natural Products (APPLE CIDER VINEGAR DIET PO), Take by mouth, Disp: , Rfl:     fluvoxaMINE (LUVOX) 50 MG tablet, Take 1 tablet by mouth nightly, Disp: 90 tablet, Rfl: 1    mupirocin (BACTROBAN) 2 % ointment, Apply topically 3 times daily as needed, Disp: 22 g, Rfl: 0    amitriptyline (ELAVIL) 150 MG tablet, Take 1 tablet by mouth nightly, Disp: , Rfl:     doxycycline hyclate (VIBRAMYCIN) 100 MG capsule, Take 1 capsule by mouth 2 times daily, Disp: , Rfl:     gabapentin (NEURONTIN) 100 MG capsule, TAKE 1 CAPSULE BY MOUTH AT BEDTIME DURING SEVERE HEADACHE, Disp: , Rfl:     CLINDAMYCIN PHOSPHATE,TOPICAL, 1 % SWAB, , Disp: , Rfl:     fluticasone (FLONASE) 50 MCG/ACT nasal spray, 1 spray by Nasal route every morning (before breakfast), Disp: 1 each, Rfl: 0    ciclopirox (LOPROX) 0.77 % cream, as needed, Disp: , Rfl:     ketoconazole (NIZORAL) 2 % shampoo, , Disp: , Rfl:     tretinoin (RETIN-A) 0.025 % cream, , Disp: , Rfl:     clobetasol prop emollient base 0.05 % CREA, Apply topically daily, Disp: , Rfl:     guaiFENesin (MUCINEX) 600 MG extended release tablet, Take 1 tablet by mouth every 12 hours as needed, Disp: , Rfl:     levonorgestrel (MIRENA) IUD 52 mg, 1 each by IntraUTERine route, Disp: , Rfl:     topiramate (TOPAMAX) 50 MG tablet, , Disp: , Rfl: 3    SUMAtriptan (IMITREX) 50 MG tablet, Take 1 tablet by mouth once as needed for Migraine, Disp: , Rfl:     fexofenadine (ALLEGRA) 180 MG tablet, Take 1 tablet by mouth daily, Disp: , Rfl:

## 2024-12-05 NOTE — OP NOTE
Cervical Facet Nerve Block:  SURGEON: Regina Moya MD    PRE-OP DIAGNOSIS:  Cervical spondylosis without myelopathy [M47.812], Cervicalgia [M54.2]    POST-OP DIAGNOSIS: Same.    PROCEDURE PERFORMED: Cervical Facet Nerve Block Multiple Levels: Left C3/4, C4/5    EBL: minimal    Physician confirmed and marked the surgical site.    CONSENT: Patient has undergone the educational process with this procedure, is aware and fully understands the risks involved: potential damage to any and all body organs including possible bleeding, infection, and nerve injury, allergic reaction and headache. Patient also understands that the procedure will be undertaken in a safe, controlled and monitored setting. patient recognizes that the benefits may include relief from pain and reduction in the oral use of medications. Patient agreed to proceed.    Risks, benefits, and alternatives including postponing the procedure were discussed. The patient does wish to proceed with the procedure at this time.      PREP: The patient's neck was prepped with chloroprep and draped appropriately. 0.5% lidocaine was used to used anesthetize the skin and subcutaneous tissue.     PROCEDURE NOTE: 25 gauge spinal needles were advanced under fluoroscopic guidance to the medial branch nerves corresponding to the indicated facet joints. Aspiration was negative. 1 ml of 0.25% Marcaine was then injected to block the facet joints at Left C3/4,C4/5.    The needle was withdrawn by the physician and the nurse applied a sterile dressing. The patient tolerated the procedure well. No complications occurred. Patient transferred to the recovery room in satisfactory condition. Appropriate written discharge instructions given to the patient.    Regina Moya MD

## 2024-12-12 ENCOUNTER — OFFICE VISIT (OUTPATIENT)
Dept: BEHAVIORAL/MENTAL HEALTH CLINIC | Age: 44
End: 2024-12-12
Payer: COMMERCIAL

## 2024-12-12 DIAGNOSIS — F41.1 GENERALIZED ANXIETY DISORDER: Primary | ICD-10-CM

## 2024-12-12 PROCEDURE — 90834 PSYTX W PT 45 MINUTES: CPT | Performed by: COUNSELOR

## 2024-12-12 ASSESSMENT — PATIENT HEALTH QUESTIONNAIRE - PHQ9
7. TROUBLE CONCENTRATING ON THINGS, SUCH AS READING THE NEWSPAPER OR WATCHING TELEVISION: NEARLY EVERY DAY
4. FEELING TIRED OR HAVING LITTLE ENERGY: NEARLY EVERY DAY
10. IF YOU CHECKED OFF ANY PROBLEMS, HOW DIFFICULT HAVE THESE PROBLEMS MADE IT FOR YOU TO DO YOUR WORK, TAKE CARE OF THINGS AT HOME, OR GET ALONG WITH OTHER PEOPLE: SOMEWHAT DIFFICULT
9. THOUGHTS THAT YOU WOULD BE BETTER OFF DEAD, OR OF HURTING YOURSELF: NOT AT ALL
8. MOVING OR SPEAKING SO SLOWLY THAT OTHER PEOPLE COULD HAVE NOTICED. OR THE OPPOSITE, BEING SO FIGETY OR RESTLESS THAT YOU HAVE BEEN MOVING AROUND A LOT MORE THAN USUAL: MORE THAN HALF THE DAYS
3. TROUBLE FALLING OR STAYING ASLEEP: MORE THAN HALF THE DAYS
2. FEELING DOWN, DEPRESSED OR HOPELESS: SEVERAL DAYS
SUM OF ALL RESPONSES TO PHQ9 QUESTIONS 1 & 2: 2
1. LITTLE INTEREST OR PLEASURE IN DOING THINGS: SEVERAL DAYS
SUM OF ALL RESPONSES TO PHQ QUESTIONS 1-9: 15
5. POOR APPETITE OR OVEREATING: MORE THAN HALF THE DAYS
SUM OF ALL RESPONSES TO PHQ QUESTIONS 1-9: 15
6. FEELING BAD ABOUT YOURSELF - OR THAT YOU ARE A FAILURE OR HAVE LET YOURSELF OR YOUR FAMILY DOWN: SEVERAL DAYS

## 2024-12-12 ASSESSMENT — ANXIETY QUESTIONNAIRES
3. WORRYING TOO MUCH ABOUT DIFFERENT THINGS: NEARLY EVERY DAY
2. NOT BEING ABLE TO STOP OR CONTROL WORRYING: MORE THAN HALF THE DAYS
6. BECOMING EASILY ANNOYED OR IRRITABLE: NEARLY EVERY DAY
IF YOU CHECKED OFF ANY PROBLEMS ON THIS QUESTIONNAIRE, HOW DIFFICULT HAVE THESE PROBLEMS MADE IT FOR YOU TO DO YOUR WORK, TAKE CARE OF THINGS AT HOME, OR GET ALONG WITH OTHER PEOPLE: SOMEWHAT DIFFICULT
4. TROUBLE RELAXING: NEARLY EVERY DAY
5. BEING SO RESTLESS THAT IT IS HARD TO SIT STILL: NEARLY EVERY DAY
7. FEELING AFRAID AS IF SOMETHING AWFUL MIGHT HAPPEN: MORE THAN HALF THE DAYS
GAD7 TOTAL SCORE: 17
1. FEELING NERVOUS, ANXIOUS, OR ON EDGE: SEVERAL DAYS

## 2024-12-12 NOTE — PROGRESS NOTES
ADULT BEHAVIORAL HEALTH FOLLOW UP  Jessica Lomeli       Visit Date: 12/12/2024   Time of appointment:  10:52a   Time spent with Patient: 40 minutes.   This is patient's  22nd  appointment.    Reason for Consult:  Follow-up     Referring Provider/PCP:    No ref. provider found  Oxana Nance, APRN - CNP      Pt provided informed consent for the behavioral health program. Discussed with patient model of service to include the limits of confidentiality (i.e. abuse reporting, suicide intervention, etc.) and short-term intervention focused approach.  Pt indicated understanding.    BUSTER Hackett is a 43 y.o. female who presents for follow up of anxiety. She processed how Thanksgiving went with her family. Therapist provided active listening and validated feelings. She identified anxiety has increased and reported this could be due to work stress as well as feeling tired. She processed upcoming holiday plans and birthday plans. She reported she is having difficulty waking up on time for work and therapist assisted her in identifying ways to overcome barriers. Therapist provided psychoeducation on how to work on sleep hygiene and encouraged her to work on skills identified. Therapist utilized MI to assess insight into barriers to coping skills. She continued processing grief around holidays. Therapist provided psychoeducation on journaling grief and writing letters to dad. She reported she could work on this.     Previous Recommendations: Encouraged to work on coping/self-care skills, confront maladaptive thinking, encouraged to remind herself what she is in control of and use communication tools.             MENTAL STATUS EXAM  Mood was within normal limits with calm affect.   Suicidal ideation was denied.   Homicidal ideation was denied.   Hygiene was fair .  Dress was appropriate.   Behavior was Within Normal Limits with No observation or self-report of difficulties ambulating.   Attitude was

## 2024-12-18 ENCOUNTER — TELEPHONE (OUTPATIENT)
Dept: PAIN MANAGEMENT | Age: 44
End: 2024-12-18

## 2024-12-18 NOTE — TELEPHONE ENCOUNTER
Pt called in to state that she has a stuffy nose and is having drainage. Pt has an appt on 12/19 at 08:00am and would like to r/s. Please advise.

## 2025-01-23 ENCOUNTER — OFFICE VISIT (OUTPATIENT)
Dept: BEHAVIORAL/MENTAL HEALTH CLINIC | Age: 45
End: 2025-01-23
Payer: COMMERCIAL

## 2025-01-23 DIAGNOSIS — F41.1 GENERALIZED ANXIETY DISORDER: Primary | ICD-10-CM

## 2025-01-23 PROCEDURE — 90834 PSYTX W PT 45 MINUTES: CPT | Performed by: COUNSELOR

## 2025-01-23 ASSESSMENT — ANXIETY QUESTIONNAIRES
1. FEELING NERVOUS, ANXIOUS, OR ON EDGE: SEVERAL DAYS
5. BEING SO RESTLESS THAT IT IS HARD TO SIT STILL: MORE THAN HALF THE DAYS
GAD7 TOTAL SCORE: 8
3. WORRYING TOO MUCH ABOUT DIFFERENT THINGS: SEVERAL DAYS
4. TROUBLE RELAXING: SEVERAL DAYS
6. BECOMING EASILY ANNOYED OR IRRITABLE: MORE THAN HALF THE DAYS
2. NOT BEING ABLE TO STOP OR CONTROL WORRYING: NOT AT ALL
7. FEELING AFRAID AS IF SOMETHING AWFUL MIGHT HAPPEN: SEVERAL DAYS
IF YOU CHECKED OFF ANY PROBLEMS ON THIS QUESTIONNAIRE, HOW DIFFICULT HAVE THESE PROBLEMS MADE IT FOR YOU TO DO YOUR WORK, TAKE CARE OF THINGS AT HOME, OR GET ALONG WITH OTHER PEOPLE: SOMEWHAT DIFFICULT

## 2025-01-23 ASSESSMENT — PATIENT HEALTH QUESTIONNAIRE - PHQ9
8. MOVING OR SPEAKING SO SLOWLY THAT OTHER PEOPLE COULD HAVE NOTICED. OR THE OPPOSITE, BEING SO FIGETY OR RESTLESS THAT YOU HAVE BEEN MOVING AROUND A LOT MORE THAN USUAL: SEVERAL DAYS
2. FEELING DOWN, DEPRESSED OR HOPELESS: SEVERAL DAYS
SUM OF ALL RESPONSES TO PHQ QUESTIONS 1-9: 13
5. POOR APPETITE OR OVEREATING: NEARLY EVERY DAY
10. IF YOU CHECKED OFF ANY PROBLEMS, HOW DIFFICULT HAVE THESE PROBLEMS MADE IT FOR YOU TO DO YOUR WORK, TAKE CARE OF THINGS AT HOME, OR GET ALONG WITH OTHER PEOPLE: SOMEWHAT DIFFICULT
3. TROUBLE FALLING OR STAYING ASLEEP: NEARLY EVERY DAY
SUM OF ALL RESPONSES TO PHQ9 QUESTIONS 1 & 2: 2
1. LITTLE INTEREST OR PLEASURE IN DOING THINGS: SEVERAL DAYS
SUM OF ALL RESPONSES TO PHQ QUESTIONS 1-9: 13
7. TROUBLE CONCENTRATING ON THINGS, SUCH AS READING THE NEWSPAPER OR WATCHING TELEVISION: SEVERAL DAYS
SUM OF ALL RESPONSES TO PHQ QUESTIONS 1-9: 13
9. THOUGHTS THAT YOU WOULD BE BETTER OFF DEAD, OR OF HURTING YOURSELF: NOT AT ALL
6. FEELING BAD ABOUT YOURSELF - OR THAT YOU ARE A FAILURE OR HAVE LET YOURSELF OR YOUR FAMILY DOWN: NOT AT ALL
SUM OF ALL RESPONSES TO PHQ QUESTIONS 1-9: 13
4. FEELING TIRED OR HAVING LITTLE ENERGY: NEARLY EVERY DAY

## 2025-01-23 NOTE — PROGRESS NOTES
ADULT BEHAVIORAL HEALTH FOLLOW UP  Jessica Lomeli       Visit Date: 1/23/2025   Time of appointment:  11:09a   Time spent with Patient: 46 minutes.   This is patient's  23rd  appointment.    Reason for Consult:  Follow-up     Referring Provider/PCP:    No ref. provider found  Oxana Nance, APRN - CNP      Pt provided informed consent for the behavioral health program. Discussed with patient model of service to include the limits of confidentiality (i.e. abuse reporting, suicide intervention, etc.) and short-term intervention focused approach.  Pt indicated understanding.    BUSTER Hackett is a 44 y.o. female who presents for follow up of anxiety. She processed how holidays went. Therapist provided active listening and validated feelings. She processed how she handled anniversary of dad's passing and continued processing grief/loss. She processed how she handled family conflicts. She reported she was diagnosed with POTS and processed feelings around this. She acknowledged how physical health is impacting mental health. She acknowledged fatigue has increased. Therapist utilized MI to assess insight into barriers to behavioral changes and barriers to reducing work stressors. She reported having a good birthday and reviewed support sx. Self-care was reviewed. She identified she is adjusting to the POTS dx and continued processing feelings around this. Therapist validated feelings and provided supportive care.     Previous Recommendations: Encouraged to work on grounding tools, confront maladaptive thinking, work on sleep hygiene goals, work on journaling and see how this impacts her grief processing.             MENTAL STATUS EXAM  Mood was within normal limits with calm affect.   Suicidal ideation was denied.   Homicidal ideation was denied.   Hygiene was fair .  Dress was appropriate.   Behavior was Within Normal Limits with No observation or self-report of difficulties ambulating.   Attitude was

## 2025-01-30 ENCOUNTER — HOSPITAL ENCOUNTER (OUTPATIENT)
Dept: PAIN MANAGEMENT | Facility: CLINIC | Age: 45
Discharge: HOME | End: 2025-01-30
Payer: COMMERCIAL

## 2025-01-30 VITALS
TEMPERATURE: 97.4 F | DIASTOLIC BLOOD PRESSURE: 89 MMHG | RESPIRATION RATE: 14 BRPM | OXYGEN SATURATION: 100 % | SYSTOLIC BLOOD PRESSURE: 132 MMHG | HEART RATE: 75 BPM

## 2025-01-30 DIAGNOSIS — R52 PAIN MANAGEMENT: ICD-10-CM

## 2025-01-30 LAB — HCG, PREGNANCY URINE (POC): NEGATIVE

## 2025-01-30 PROCEDURE — 64490 INJ PARAVERT F JNT C/T 1 LEV: CPT | Performed by: PAIN MEDICINE

## 2025-01-30 PROCEDURE — 64491 INJ PARAVERT F JNT C/T 2 LEV: CPT | Performed by: PAIN MEDICINE

## 2025-01-30 PROCEDURE — 81025 URINE PREGNANCY TEST: CPT

## 2025-01-30 PROCEDURE — 6360000002 HC RX W HCPCS: Performed by: PAIN MEDICINE

## 2025-01-30 PROCEDURE — 64491 INJ PARAVERT F JNT C/T 2 LEV: CPT

## 2025-01-30 PROCEDURE — 64490 INJ PARAVERT F JNT C/T 1 LEV: CPT

## 2025-01-30 RX ORDER — MIDAZOLAM HYDROCHLORIDE 2 MG/2ML
INJECTION, SOLUTION INTRAMUSCULAR; INTRAVENOUS
Status: COMPLETED | OUTPATIENT
Start: 2025-01-30 | End: 2025-01-30

## 2025-01-30 RX ORDER — LIDOCAINE HYDROCHLORIDE 5 MG/ML
INJECTION, SOLUTION INFILTRATION; INTRAVENOUS
Status: COMPLETED | OUTPATIENT
Start: 2025-01-30 | End: 2025-01-30

## 2025-01-30 RX ORDER — FLUDROCORTISONE ACETATE 0.1 MG/1
0.1 TABLET ORAL 2 TIMES DAILY
COMMUNITY

## 2025-01-30 RX ORDER — DIPHENHYDRAMINE HYDROCHLORIDE 50 MG/ML
25 INJECTION INTRAMUSCULAR; INTRAVENOUS EVERY 6 HOURS PRN
Status: CANCELLED | OUTPATIENT
Start: 2025-01-30

## 2025-01-30 RX ORDER — BUPIVACAINE HYDROCHLORIDE 2.5 MG/ML
INJECTION, SOLUTION EPIDURAL; INFILTRATION; INTRACAUDAL
Status: COMPLETED | OUTPATIENT
Start: 2025-01-30 | End: 2025-01-30

## 2025-01-30 RX ORDER — DIPHENHYDRAMINE HYDROCHLORIDE 50 MG/ML
25 INJECTION INTRAMUSCULAR; INTRAVENOUS ONCE
Status: COMPLETED | OUTPATIENT
Start: 2025-01-30 | End: 2025-01-30

## 2025-01-30 RX ADMIN — MIDAZOLAM HYDROCHLORIDE 1 MG: 1 INJECTION, SOLUTION INTRAMUSCULAR; INTRAVENOUS at 09:56

## 2025-01-30 RX ADMIN — DIPHENHYDRAMINE HYDROCHLORIDE 25 MG: 50 INJECTION, SOLUTION INTRAMUSCULAR; INTRAVENOUS at 10:27

## 2025-01-30 RX ADMIN — LIDOCAINE HYDROCHLORIDE 2 ML: 5 INJECTION, SOLUTION INFILTRATION at 09:56

## 2025-01-30 RX ADMIN — MIDAZOLAM HYDROCHLORIDE 1 MG: 1 INJECTION, SOLUTION INTRAMUSCULAR; INTRAVENOUS at 09:40

## 2025-01-30 RX ADMIN — BUPIVACAINE HYDROCHLORIDE 6 ML: 2.5 INJECTION, SOLUTION EPIDURAL; INFILTRATION; INTRACAUDAL; PERINEURAL at 09:59

## 2025-01-30 ASSESSMENT — PAIN - FUNCTIONAL ASSESSMENT: PAIN_FUNCTIONAL_ASSESSMENT: 0-10

## 2025-01-30 NOTE — DISCHARGE INSTRUCTIONS
You have received a sedative/anesthetic therefore you should not consume any alcoholic beverages for 24 hours.  Do not drive or operate machinery for 24 hours.   Do not take a tub bath for 72 hours after procedure (this includes hot tubs).  You may shower, but avoid hot water to injection site.   Avoid strenuous activity TODAY especially if you experience dizziness.   Remove band-aid the next day.    Wash off any residual iodine 24 hours from today.   Do not use heat, heating pad, or any other heating device over the injection site for 3 days after the procedure.    If you experience pain after your procedure, you may continue with your current pain medication as prescribed.  (DO NOT INCREASE YOUR PAIN MEDICATION WITHOUT TALKING TO DOCTOR)  Soreness and pain at injection site is common, may use ice to reduce soreness.    Please complete pain diary as instructed. The office staff will call you to discuss the results of the procedure within 24 hours, please call the office if you do not hear from them.      Call [facility] at [phone] if you experience:   Fever, chills or temperature over 100    Vomiting, headache, persistent stiff neck, nausea or blurred vision   Difficulty urinating or unable to urinate within 8 hours   Increase in weakness, numbness or loss of function of limbs  Increased redness, swelling or drainage at the injection site

## 2025-01-30 NOTE — OP NOTE
Cervical Facet Nerve Block:  SURGEON: Regina Moya MD    PRE-OP DIAGNOSIS:  Cervical spondylosis without myelopathy [M47.812], Cervicalgia [M54.2]    POST-OP DIAGNOSIS: Same.    PROCEDURE PERFORMED: Cervical Facet Nerve Block Multiple Levels: Left C3/4, C4/5    EBL: minimal    Physician confirmed and marked the surgical site.    CONSENT: Patient has undergone the educational process with this procedure, is aware and fully understands the risks involved: potential damage to any and all body organs including possible bleeding, infection, nerve injury, paralysis, allergic reaction and headache. Patient also understands that the procedure will be undertaken in a safe, controlled and monitored setting. patient recognizes that the benefits may include relief from pain and reduction in the oral use of medications. Patient agreed to proceed.    Risks, benefits, and alternatives including postponing the procedure were discussed. The patient does wish to proceed with the procedure at this time.      PREP: The patient's neck was prepped with chloroprep and draped appropriately. 0.5% lidocaine was used to used anesthetize the skin and subcutaneous tissue.     PROCEDURE NOTE: 25 gauge spinal needles were advanced under fluoroscopic guidance to the medial branch nerves corresponding to the indicated facet joints. Aspiration was negative. 1 ml of 0.25% Marcaine was then injected to block the facet joints at  Left C3/4, C4/5.    The needle was withdrawn by the physician and the nurse applied a sterile dressing. The patient tolerated the procedure well. No complications occurred. Patient transferred to the recovery room in satisfactory condition. Appropriate written discharge instructions given to the patient.    Regina Moya MD

## 2025-01-30 NOTE — H&P
Formaldehyde     Gold Sodium Thiomalate     Pollen Extract      Seasonal allergies         Current Outpatient Medications:     fludrocortisone (FLORINEF) 0.1 MG tablet, Take 1 tablet by mouth 2 times daily, Disp: , Rfl:     Cholecalciferol (VITAMIN D) 125 MCG (5000 UT) CAPS, Take 1.25 mg by mouth, Disp: , Rfl:     betamethasone valerate (VALISONE) 0.1 % cream, Apply 1 Application topically 2 times daily, Disp: , Rfl:     ALPRAZolam (XANAX) 1 MG tablet, TAKE 1 TABLET BY MOUTH TWICE DAILY FOR 30 DAYS, Disp: , Rfl:     magnesium gluconate (MAGONATE) 500 MG tablet, Take 1 tablet by mouth 2 times daily, Disp: , Rfl:     Misc Natural Products (APPLE CIDER VINEGAR DIET PO), Take by mouth, Disp: , Rfl:     fluvoxaMINE (LUVOX) 50 MG tablet, Take 1 tablet by mouth nightly, Disp: 90 tablet, Rfl: 1    mupirocin (BACTROBAN) 2 % ointment, Apply topically 3 times daily as needed, Disp: 22 g, Rfl: 0    amitriptyline (ELAVIL) 150 MG tablet, Take 1 tablet by mouth nightly, Disp: , Rfl:     doxycycline hyclate (VIBRAMYCIN) 100 MG capsule, Take 1 capsule by mouth 2 times daily, Disp: , Rfl:     gabapentin (NEURONTIN) 100 MG capsule, TAKE 1 CAPSULE BY MOUTH AT BEDTIME DURING SEVERE HEADACHE, Disp: , Rfl:     CLINDAMYCIN PHOSPHATE,TOPICAL, 1 % SWAB, , Disp: , Rfl:     fluticasone (FLONASE) 50 MCG/ACT nasal spray, 1 spray by Nasal route every morning (before breakfast), Disp: 1 each, Rfl: 0    ciclopirox (LOPROX) 0.77 % cream, as needed, Disp: , Rfl:     ketoconazole (NIZORAL) 2 % shampoo, , Disp: , Rfl:     tretinoin (RETIN-A) 0.025 % cream, , Disp: , Rfl:     clobetasol prop emollient base 0.05 % CREA, Apply topically daily, Disp: , Rfl:     guaiFENesin (MUCINEX) 600 MG extended release tablet, Take 1 tablet by mouth every 12 hours as needed, Disp: , Rfl:     levonorgestrel (MIRENA) IUD 52 mg, 1 each by IntraUTERine route, Disp: , Rfl:     topiramate (TOPAMAX) 50 MG tablet, , Disp: , Rfl: 3    SUMAtriptan (IMITREX) 50 MG tablet, Take 1

## 2025-01-31 ENCOUNTER — TELEPHONE (OUTPATIENT)
Dept: PAIN MANAGEMENT | Age: 45
End: 2025-01-31

## 2025-01-31 NOTE — TELEPHONE ENCOUNTER
S/P:VANESSA #2- Cervical Lef 3/4, 4/5      DOS: 01/30/2025    Pain  before procedure with activity: 6    Pain after procedure with activity: 0    Activities following procedure include: shopping    % of pain relief: 100    Procedure successful: Yes    OV or OR scheduled:

## 2025-02-03 DIAGNOSIS — F32.A DEPRESSION, UNSPECIFIED DEPRESSION TYPE: ICD-10-CM

## 2025-02-03 DIAGNOSIS — F41.9 ANXIETY: ICD-10-CM

## 2025-02-05 RX ORDER — FLUVOXAMINE MALEATE 50 MG
50 TABLET ORAL NIGHTLY
Qty: 90 TABLET | Refills: 0 | Status: SHIPPED | OUTPATIENT
Start: 2025-02-05 | End: 2026-02-05

## 2025-02-05 NOTE — TELEPHONE ENCOUNTER
LOV 6/13/24  LRF 6/13/24  RTO My chart message sent    Health Maintenance   Topic Date Due    Hepatitis B vaccine (2 of 3 - 19+ 3-dose series) 09/09/2002    Breast cancer screen  06/10/2023    DTaP/Tdap/Td vaccine (2 - Td or Tdap) 10/16/2024    Depression Monitoring  01/23/2026    Cervical cancer screen  06/24/2026    Diabetes screen  12/14/2026    Lipids  02/24/2027    HPV vaccine  Completed    Flu vaccine  Completed    COVID-19 Vaccine  Completed    Hepatitis C screen  Completed    HIV screen  Completed    Hepatitis A vaccine  Aged Out    Hib vaccine  Aged Out    Polio vaccine  Aged Out    Meningococcal (ACWY) vaccine  Aged Out    Pneumococcal 0-64 years Vaccine  Aged Out    Varicella vaccine  Discontinued             (applicable per patient's age: Cancer Screenings, Depression Screening, Fall Risk Screening, Immunizations)    AST (U/L)   Date Value   12/14/2023 19     ALT (U/L)   Date Value   12/14/2023 19     BUN (mg/dL)   Date Value   12/14/2023 17      (goal A1C is < 7)   (goal LDL is <100) need 30-50% reduction from baseline     BP Readings from Last 3 Encounters:   01/30/25 132/89   12/05/24 110/67   11/14/24 118/61    (goal /80)      All Future Testing planned in [facility]:  Lab Frequency Next Occurrence   CRISTIN AVINASH DIGITAL SCREEN BILATERAL Once 06/13/2024       Next Visit Date:  Future Appointments   Date Time Provider Department Center   2/13/2025 10:00 AM Jessica Lomeli [facility]   2/27/2025 11:00 AM Jessica Lomeli [facility]   2/27/2025 12:00 PM Regina Moya MD [facility] [place]   3/11/2025  8:45 AM Regina Moya MD [facility]            Patient Active Problem List:     Allergic rhinitis     Migraine without aura     Constipation     Generalized anxiety disorder     Hyperlipidemia     High risk HPV infection     Vasovagal syncope     Lichen sclerosus     Dysplasia of cervix, high grade CAMERON 2     Vulvar intraepithelial neoplasia (NELIDA) grade 1     S/P LEEP

## 2025-02-13 ENCOUNTER — TELEMEDICINE (OUTPATIENT)
Dept: BEHAVIORAL/MENTAL HEALTH CLINIC | Age: 45
End: 2025-02-13
Payer: COMMERCIAL

## 2025-02-13 DIAGNOSIS — F41.1 GENERALIZED ANXIETY DISORDER: Primary | ICD-10-CM

## 2025-02-13 PROCEDURE — 90834 PSYTX W PT 45 MINUTES: CPT | Performed by: COUNSELOR

## 2025-02-13 ASSESSMENT — ANXIETY QUESTIONNAIRES
5. BEING SO RESTLESS THAT IT IS HARD TO SIT STILL: SEVERAL DAYS
3. WORRYING TOO MUCH ABOUT DIFFERENT THINGS: MORE THAN HALF THE DAYS
3. WORRYING TOO MUCH ABOUT DIFFERENT THINGS: MORE THAN HALF THE DAYS
GAD7 TOTAL SCORE: 9
4. TROUBLE RELAXING: SEVERAL DAYS
IF YOU CHECKED OFF ANY PROBLEMS ON THIS QUESTIONNAIRE, HOW DIFFICULT HAVE THESE PROBLEMS MADE IT FOR YOU TO DO YOUR WORK, TAKE CARE OF THINGS AT HOME, OR GET ALONG WITH OTHER PEOPLE: SOMEWHAT DIFFICULT
7. FEELING AFRAID AS IF SOMETHING AWFUL MIGHT HAPPEN: SEVERAL DAYS
7. FEELING AFRAID AS IF SOMETHING AWFUL MIGHT HAPPEN: SEVERAL DAYS
1. FEELING NERVOUS, ANXIOUS, OR ON EDGE: SEVERAL DAYS
2. NOT BEING ABLE TO STOP OR CONTROL WORRYING: SEVERAL DAYS
6. BECOMING EASILY ANNOYED OR IRRITABLE: MORE THAN HALF THE DAYS
IF YOU CHECKED OFF ANY PROBLEMS ON THIS QUESTIONNAIRE, HOW DIFFICULT HAVE THESE PROBLEMS MADE IT FOR YOU TO DO YOUR WORK, TAKE CARE OF THINGS AT HOME, OR GET ALONG WITH OTHER PEOPLE: SOMEWHAT DIFFICULT
2. NOT BEING ABLE TO STOP OR CONTROL WORRYING: SEVERAL DAYS
4. TROUBLE RELAXING: SEVERAL DAYS
5. BEING SO RESTLESS THAT IT IS HARD TO SIT STILL: SEVERAL DAYS
6. BECOMING EASILY ANNOYED OR IRRITABLE: MORE THAN HALF THE DAYS
1. FEELING NERVOUS, ANXIOUS, OR ON EDGE: SEVERAL DAYS

## 2025-02-13 ASSESSMENT — PATIENT HEALTH QUESTIONNAIRE - PHQ9
8. MOVING OR SPEAKING SO SLOWLY THAT OTHER PEOPLE COULD HAVE NOTICED. OR THE OPPOSITE - BEING SO FIDGETY OR RESTLESS THAT YOU HAVE BEEN MOVING AROUND A LOT MORE THAN USUAL: SEVERAL DAYS
4. FEELING TIRED OR HAVING LITTLE ENERGY: MORE THAN HALF THE DAYS
SUM OF ALL RESPONSES TO PHQ QUESTIONS 1-9: 9
6. FEELING BAD ABOUT YOURSELF - OR THAT YOU ARE A FAILURE OR HAVE LET YOURSELF OR YOUR FAMILY DOWN: SEVERAL DAYS
SUM OF ALL RESPONSES TO PHQ9 QUESTIONS 1 & 2: 2
9. THOUGHTS THAT YOU WOULD BE BETTER OFF DEAD, OR OF HURTING YOURSELF: NOT AT ALL
5. POOR APPETITE OR OVEREATING: SEVERAL DAYS
10. IF YOU CHECKED OFF ANY PROBLEMS, HOW DIFFICULT HAVE THESE PROBLEMS MADE IT FOR YOU TO DO YOUR WORK, TAKE CARE OF THINGS AT HOME, OR GET ALONG WITH OTHER PEOPLE: SOMEWHAT DIFFICULT
1. LITTLE INTEREST OR PLEASURE IN DOING THINGS: SEVERAL DAYS
10. IF YOU CHECKED OFF ANY PROBLEMS, HOW DIFFICULT HAVE THESE PROBLEMS MADE IT FOR YOU TO DO YOUR WORK, TAKE CARE OF THINGS AT HOME, OR GET ALONG WITH OTHER PEOPLE: SOMEWHAT DIFFICULT
9. THOUGHTS THAT YOU WOULD BE BETTER OFF DEAD, OR OF HURTING YOURSELF: NOT AT ALL
3. TROUBLE FALLING OR STAYING ASLEEP: SEVERAL DAYS
SUM OF ALL RESPONSES TO PHQ QUESTIONS 1-9: 9
SUM OF ALL RESPONSES TO PHQ QUESTIONS 1-9: 9
8. MOVING OR SPEAKING SO SLOWLY THAT OTHER PEOPLE COULD HAVE NOTICED. OR THE OPPOSITE, BEING SO FIGETY OR RESTLESS THAT YOU HAVE BEEN MOVING AROUND A LOT MORE THAN USUAL: SEVERAL DAYS
SUM OF ALL RESPONSES TO PHQ QUESTIONS 1-9: 9
7. TROUBLE CONCENTRATING ON THINGS, SUCH AS READING THE NEWSPAPER OR WATCHING TELEVISION: SEVERAL DAYS
2. FEELING DOWN, DEPRESSED OR HOPELESS: SEVERAL DAYS
5. POOR APPETITE OR OVEREATING: SEVERAL DAYS
SUM OF ALL RESPONSES TO PHQ QUESTIONS 1-9: 9
7. TROUBLE CONCENTRATING ON THINGS, SUCH AS READING THE NEWSPAPER OR WATCHING TELEVISION: SEVERAL DAYS
1. LITTLE INTEREST OR PLEASURE IN DOING THINGS: SEVERAL DAYS
2. FEELING DOWN, DEPRESSED OR HOPELESS: SEVERAL DAYS
3. TROUBLE FALLING OR STAYING ASLEEP: SEVERAL DAYS
6. FEELING BAD ABOUT YOURSELF - OR THAT YOU ARE A FAILURE OR HAVE LET YOURSELF OR YOUR FAMILY DOWN: SEVERAL DAYS
4. FEELING TIRED OR HAVING LITTLE ENERGY: MORE THAN HALF THE DAYS

## 2025-02-14 RX ORDER — PROPOFOL 10 MG/ML
INJECTION, EMULSION INTRAVENOUS
Status: DISPENSED
Start: 2025-02-14 | End: 2025-02-14

## 2025-02-27 ENCOUNTER — HOSPITAL ENCOUNTER (OUTPATIENT)
Dept: PAIN MANAGEMENT | Facility: CLINIC | Age: 45
Discharge: HOME | End: 2025-02-27
Payer: COMMERCIAL

## 2025-02-27 VITALS
BODY MASS INDEX: 30.66 KG/M2 | OXYGEN SATURATION: 100 % | HEART RATE: 80 BPM | SYSTOLIC BLOOD PRESSURE: 125 MMHG | TEMPERATURE: 97.8 F | DIASTOLIC BLOOD PRESSURE: 66 MMHG | WEIGHT: 207 LBS | RESPIRATION RATE: 12 BRPM | HEIGHT: 69 IN

## 2025-02-27 DIAGNOSIS — R52 PAIN MANAGEMENT: ICD-10-CM

## 2025-02-27 LAB — HCG, PREGNANCY URINE (POC): NEGATIVE

## 2025-02-27 PROCEDURE — 6360000002 HC RX W HCPCS: Performed by: PAIN MEDICINE

## 2025-02-27 PROCEDURE — 64634 DESTROY C/TH FACET JNT ADDL: CPT

## 2025-02-27 PROCEDURE — 64633 DESTROY CERV/THOR FACET JNT: CPT

## 2025-02-27 PROCEDURE — 64633 DESTROY CERV/THOR FACET JNT: CPT | Performed by: PAIN MEDICINE

## 2025-02-27 PROCEDURE — 64634 DESTROY C/TH FACET JNT ADDL: CPT | Performed by: PAIN MEDICINE

## 2025-02-27 PROCEDURE — 81025 URINE PREGNANCY TEST: CPT

## 2025-02-27 RX ORDER — LIDOCAINE HYDROCHLORIDE 5 MG/ML
INJECTION, SOLUTION INFILTRATION; INTRAVENOUS
Status: COMPLETED | OUTPATIENT
Start: 2025-02-27 | End: 2025-02-27

## 2025-02-27 RX ORDER — MIDAZOLAM HYDROCHLORIDE 2 MG/2ML
INJECTION, SOLUTION INTRAMUSCULAR; INTRAVENOUS
Status: COMPLETED | OUTPATIENT
Start: 2025-02-27 | End: 2025-02-27

## 2025-02-27 RX ORDER — BUPIVACAINE HYDROCHLORIDE 2.5 MG/ML
INJECTION, SOLUTION EPIDURAL; INFILTRATION; INTRACAUDAL
Status: COMPLETED | OUTPATIENT
Start: 2025-02-27 | End: 2025-02-27

## 2025-02-27 RX ORDER — DIPHENHYDRAMINE HYDROCHLORIDE 50 MG/ML
25 INJECTION INTRAMUSCULAR; INTRAVENOUS ONCE
Status: COMPLETED | OUTPATIENT
Start: 2025-02-27 | End: 2025-02-27

## 2025-02-27 RX ADMIN — LIDOCAINE HYDROCHLORIDE 6 ML: 5 INJECTION, SOLUTION INFILTRATION; INTRAVENOUS at 12:05

## 2025-02-27 RX ADMIN — MIDAZOLAM HYDROCHLORIDE 1 MG: 1 INJECTION, SOLUTION INTRAMUSCULAR; INTRAVENOUS at 12:00

## 2025-02-27 RX ADMIN — BUPIVACAINE HYDROCHLORIDE 2 ML: 2.5 INJECTION, SOLUTION EPIDURAL; INFILTRATION; INTRACAUDAL; PERINEURAL at 12:12

## 2025-02-27 RX ADMIN — DIPHENHYDRAMINE HYDROCHLORIDE 25 MG: 50 INJECTION, SOLUTION INTRAMUSCULAR; INTRAVENOUS at 12:37

## 2025-02-27 RX ADMIN — BUPIVACAINE HYDROCHLORIDE 1 ML: 2.5 INJECTION, SOLUTION EPIDURAL; INFILTRATION; INTRACAUDAL; PERINEURAL at 12:19

## 2025-02-27 ASSESSMENT — PAIN - FUNCTIONAL ASSESSMENT
PAIN_FUNCTIONAL_ASSESSMENT: 0-10
PAIN_FUNCTIONAL_ASSESSMENT: PREVENTS OR INTERFERES WITH MANY ACTIVE NOT PASSIVE ACTIVITIES

## 2025-02-27 ASSESSMENT — PAIN SCALES - GENERAL: PAINLEVEL_OUTOF10: 0

## 2025-02-27 ASSESSMENT — PAIN DESCRIPTION - DESCRIPTORS: DESCRIPTORS: ACHING;SHOOTING

## 2025-02-27 NOTE — OP NOTE
Cervical Radiofrequency Ablation:  SURGEON: Regina Moya MD    PRE-OP DIAGNOSIS: M47.812 (cervical spondylosis), M54.2 (neck pain)    POST-OP DIAGNOSIS: Same.    PROCEDURE PERFORMED: Radiofrequency Ablation Medial Branch Nerve Left at C3/4, C4/5 facet joint(s).    HISTORY AND INDICATIONS: Satisfactory response with previous RFA/ medial branch blocks at the indicated levels.    Recurrence of painful symptoms attributed to the above diagnosis.    The planned treatment is medically necessary to relieve pain, restore function and or reduce reliance on pain medication.    CONSENT: Patient has undergone the educational process with this procedure, is aware and fully understands the risks involved: potential damage to any and all body organs including possible bleeding, infection, nerve injury, paralysis, allergic reaction and headache. Patient also understands that the procedure will be undertaken in a safe, controlled and monitored setting. Patient recognizes that the benefits may include relief from pain and reduction in the oral use of medications. Patient agreed to proceed.     Risks, benefits, and alternatives including postponing the procedure were discussed. The patient does wish to proceed with the procedure at this time.    EBL: minimal      PROCEDURE NOTE: The patient was taken to the procedure room and placed prone with the appropriate padding and positioning to assure patient comfort and physician access to the procedure site. Fluoroscopic evaluation was utilized to target the appropriate treatment areas.The skin was prepped with antiseptic solution and draped sterilely. 0.5% lidocaine was used to used anesthetize skin and subcutaneous tissue. Under fluoroscopic guidance 22 gauge x 5mm active tip needles were advanced to the medial branch nerve at the indicated levels below to innervate the following facet joints Left C3/4, C4/5. Position confirmed with fluoroscopy.  Aspiration was negative. Motor

## 2025-02-27 NOTE — H&P
1 tablet by mouth once as needed for Migraine, Disp: , Rfl:     fexofenadine (ALLEGRA) 180 MG tablet, Take 1 tablet by mouth daily, Disp: , Rfl:     EPINEPHrine (EPIPEN) 0.3 MG/0.3ML SOAJ injection, Inject 0.3 mLs into the muscle as needed (exercise induce anaphlaxis) (Patient not taking: Reported on 12/5/2024), Disp: 1 each, Rfl: 1    Social History     Tobacco Use    Smoking status: Never    Smokeless tobacco: Never   Substance Use Topics    Alcohol use: Yes     Alcohol/week: 0.0 standard drinks of alcohol     Comment: occasional       Review of Systems:   Focused review of systems was performed, and negative as pertinent to diagnosis, except as stated in HPI.      Physical Exam  Constitutional:       Appearance: Normal appearance.   Pulmonary:      Effort: Pulmonary effort is normal.   Neurological:      Mental Status: alert.   Psychiatric:         Attention and Perception: Attention and perception normal.         Mood and Affect: Mood and affect normal.   Cardiovascular:      Rate: Normal rate.         ASA: 3          Mallampati: 2       Patient's current physical status, medications, medical history, and HPI have been reviewed and updated as appropriate on this date: 02/27/25    Risk/Benefit(s): The risks, benefits, alternatives, and potential complications have been discussed with the patient/family and informed consent has been obtained for the procedure/sedation.    Diagnosis:   Spondylosis      Plan: Radiofrequency ablation        Regina Moya MD

## 2025-02-27 NOTE — DISCHARGE INSTRUCTIONS
You have received a sedative/anesthetic therefore you should not consume any alcoholic beverages for 24 hours.  Do not drive or operate machinery for 24 hours.   Do not take a tub bath for 72 hours after procedure (this includes hot tubs).  You may shower, but avoid hot water to injection site.   Avoid strenuous activity TODAY especially if you experience dizziness.   Remove band-aid the next day.    Wash off any residual iodine 24 hours from today.   Do not use heat, heating pad, or any other heating device over the injection site for 3 days after the procedure.    If you experience pain after your procedure, you may continue with your current pain medication as prescribed.  (DO NOT INCREASE YOUR PAIN MEDICATION WITHOUT TALKING TO DOCTOR)  Soreness and pain at injection site is common, may use ice to reduce soreness.    Call [facility] at [phone] if you experience:   Fever, chills or temperature over 100    Vomiting, headache, persistent stiff neck, nausea or blurred vision   Difficulty urinating or unable to urinate within 8 hours   Increase in weakness, numbness or loss of function of limbs  Increased redness, swelling or drainage at the injection site

## 2025-03-06 ENCOUNTER — OFFICE VISIT (OUTPATIENT)
Dept: BEHAVIORAL/MENTAL HEALTH CLINIC | Age: 45
End: 2025-03-06
Payer: COMMERCIAL

## 2025-03-06 DIAGNOSIS — F41.1 GENERALIZED ANXIETY DISORDER: Primary | ICD-10-CM

## 2025-03-06 PROCEDURE — 90834 PSYTX W PT 45 MINUTES: CPT | Performed by: COUNSELOR

## 2025-03-06 ASSESSMENT — PATIENT HEALTH QUESTIONNAIRE - PHQ9
7. TROUBLE CONCENTRATING ON THINGS, SUCH AS READING THE NEWSPAPER OR WATCHING TELEVISION: SEVERAL DAYS
5. POOR APPETITE OR OVEREATING: MORE THAN HALF THE DAYS
8. MOVING OR SPEAKING SO SLOWLY THAT OTHER PEOPLE COULD HAVE NOTICED. OR THE OPPOSITE, BEING SO FIGETY OR RESTLESS THAT YOU HAVE BEEN MOVING AROUND A LOT MORE THAN USUAL: SEVERAL DAYS
4. FEELING TIRED OR HAVING LITTLE ENERGY: MORE THAN HALF THE DAYS
SUM OF ALL RESPONSES TO PHQ QUESTIONS 1-9: 12
1. LITTLE INTEREST OR PLEASURE IN DOING THINGS: SEVERAL DAYS
SUM OF ALL RESPONSES TO PHQ QUESTIONS 1-9: 12
10. IF YOU CHECKED OFF ANY PROBLEMS, HOW DIFFICULT HAVE THESE PROBLEMS MADE IT FOR YOU TO DO YOUR WORK, TAKE CARE OF THINGS AT HOME, OR GET ALONG WITH OTHER PEOPLE: SOMEWHAT DIFFICULT
SUM OF ALL RESPONSES TO PHQ QUESTIONS 1-9: 12
3. TROUBLE FALLING OR STAYING ASLEEP: MORE THAN HALF THE DAYS
9. THOUGHTS THAT YOU WOULD BE BETTER OFF DEAD, OR OF HURTING YOURSELF: NOT AT ALL
SUM OF ALL RESPONSES TO PHQ QUESTIONS 1-9: 12
2. FEELING DOWN, DEPRESSED OR HOPELESS: SEVERAL DAYS
6. FEELING BAD ABOUT YOURSELF - OR THAT YOU ARE A FAILURE OR HAVE LET YOURSELF OR YOUR FAMILY DOWN: MORE THAN HALF THE DAYS

## 2025-03-06 ASSESSMENT — ANXIETY QUESTIONNAIRES
2. NOT BEING ABLE TO STOP OR CONTROL WORRYING: SEVERAL DAYS
6. BECOMING EASILY ANNOYED OR IRRITABLE: SEVERAL DAYS
7. FEELING AFRAID AS IF SOMETHING AWFUL MIGHT HAPPEN: MORE THAN HALF THE DAYS
IF YOU CHECKED OFF ANY PROBLEMS ON THIS QUESTIONNAIRE, HOW DIFFICULT HAVE THESE PROBLEMS MADE IT FOR YOU TO DO YOUR WORK, TAKE CARE OF THINGS AT HOME, OR GET ALONG WITH OTHER PEOPLE: SOMEWHAT DIFFICULT
3. WORRYING TOO MUCH ABOUT DIFFERENT THINGS: MORE THAN HALF THE DAYS
5. BEING SO RESTLESS THAT IT IS HARD TO SIT STILL: MORE THAN HALF THE DAYS
1. FEELING NERVOUS, ANXIOUS, OR ON EDGE: SEVERAL DAYS
4. TROUBLE RELAXING: MORE THAN HALF THE DAYS
GAD7 TOTAL SCORE: 11

## 2025-03-06 NOTE — PROGRESS NOTES
follow-up.    Diagnoses and all orders for this visit:    Generalized anxiety disorder        INTERVENTION  Practiced assertive communication, Trained in strategies for increasing balanced thinking, Discussed and set plan for behavioral activation, Trained in relaxation strategies, Trained in improving communication skills, Discussed potential treatments for  anxiety and stress, Provided education, Discussed self-care (sleep, nutrition, rewarding activities, social support, exercise), Motivational Interviewing to determine importance and readiness for change, Discussed potential barriers to change, Supportive techniques, Identified maladaptive thoughts, and Motivational Interviewing to target pt's barriers to goals.     PLAN  Encouraged to work on tracking and bring journal to next session. Encouraged to continue to work on coping/self-care skills, continue to confront maladaptive thinking and utilize support sx     INTERACTIVE COMPLEXITY  Is interactive complexity present?  No  Reason:    Additional Supporting Information:  N/A     Electronically signed by Jessica Lomeli on 3/6/2025 at 11:12 AM

## 2025-03-11 ENCOUNTER — OFFICE VISIT (OUTPATIENT)
Dept: PAIN MANAGEMENT | Age: 45
End: 2025-03-11
Payer: COMMERCIAL

## 2025-03-11 VITALS — HEIGHT: 69 IN | BODY MASS INDEX: 30.66 KG/M2 | WEIGHT: 207 LBS

## 2025-03-11 DIAGNOSIS — M47.812 CERVICAL SPONDYLOSIS: Primary | ICD-10-CM

## 2025-03-11 PROCEDURE — 99212 OFFICE O/P EST SF 10 MIN: CPT | Performed by: PAIN MEDICINE

## 2025-03-11 NOTE — PROGRESS NOTES
HPI:     Neck Pain   This is a chronic problem. The current episode started more than 1 year ago. The problem occurs constantly. The problem has been unchanged. The pain is associated with nothing. The pain is present in the left side. The quality of the pain is described as aching, burning, cramping, shooting and stabbing. The pain is severe. The symptoms are aggravated by coughing, twisting, bending and position. The pain is Worse during the day. Stiffness is present All day. She has tried heat, ice, NSAIDs and bed rest for the symptoms.     Recent cervical RFA, still feeling soreness from the procedure.  No drainage, no evidence of infection.  Does follow with neurology for headaches as well.    Pain ranges from a 3/10 to a 6/10 depending on activity.    Patient denies any new neurological symptoms. No bowel or bladder incontinence, no weakness, and no falling.    Review of [place] does not show any aberrant prescription behavior.     Past Medical History:   Diagnosis Date    Acne     Allergic rhinitis     Chickenpox     Depression     Esophageal reflux     Functional dyspepsia     GERD (gastroesophageal reflux disease) 01/03/2012    Headache(784.0)     HPV (human papilloma virus) anogenital infection     Hyperlipidemia     Lichen     PONV (postoperative nausea and vomiting)     Syncope     Unspecified constipation     Unspecified sleep apnea        Past Surgical History:   Procedure Laterality Date    BREAST SURGERY Left     cyst removed    CAPSULE ENDOSCOPY N/A 8/30/2023    EGD, BRAVO PLACEMENT performed by Chapincito Davis DO at [facility] OR    COLONOSCOPY N/A 05/14/2014    RECTAL BIOPSY, normal mucosa    COLPOSCOPY  2017    ENDOSCOPY, COLON, DIAGNOSTIC      INTRAUTERINE DEVICE INSERTION  05/13/2015    Mirena    ID COLPOSCOPY CERVIX VAG LOOP ELTRD BX CERVIX N/A 07/05/2018    LEEP performed by Yonathan Alvarado DO at [facility] OR    SINUS SURGERY      UPPER GASTROINTESTINAL ENDOSCOPY  12/10/2014    Small hiatal hernia. esophageal

## 2025-03-20 ENCOUNTER — OFFICE VISIT (OUTPATIENT)
Dept: BEHAVIORAL/MENTAL HEALTH CLINIC | Age: 45
End: 2025-03-20
Payer: COMMERCIAL

## 2025-03-20 DIAGNOSIS — F41.1 GENERALIZED ANXIETY DISORDER: Primary | ICD-10-CM

## 2025-03-20 PROCEDURE — 90834 PSYTX W PT 45 MINUTES: CPT | Performed by: COUNSELOR

## 2025-03-20 ASSESSMENT — ANXIETY QUESTIONNAIRES
5. BEING SO RESTLESS THAT IT IS HARD TO SIT STILL: MORE THAN HALF THE DAYS
GAD7 TOTAL SCORE: 12
6. BECOMING EASILY ANNOYED OR IRRITABLE: MORE THAN HALF THE DAYS
1. FEELING NERVOUS, ANXIOUS, OR ON EDGE: MORE THAN HALF THE DAYS
IF YOU CHECKED OFF ANY PROBLEMS ON THIS QUESTIONNAIRE, HOW DIFFICULT HAVE THESE PROBLEMS MADE IT FOR YOU TO DO YOUR WORK, TAKE CARE OF THINGS AT HOME, OR GET ALONG WITH OTHER PEOPLE: SOMEWHAT DIFFICULT
2. NOT BEING ABLE TO STOP OR CONTROL WORRYING: MORE THAN HALF THE DAYS
3. WORRYING TOO MUCH ABOUT DIFFERENT THINGS: MORE THAN HALF THE DAYS
4. TROUBLE RELAXING: SEVERAL DAYS
7. FEELING AFRAID AS IF SOMETHING AWFUL MIGHT HAPPEN: SEVERAL DAYS

## 2025-03-20 ASSESSMENT — PATIENT HEALTH QUESTIONNAIRE - PHQ9
1. LITTLE INTEREST OR PLEASURE IN DOING THINGS: MORE THAN HALF THE DAYS
SUM OF ALL RESPONSES TO PHQ QUESTIONS 1-9: 14
SUM OF ALL RESPONSES TO PHQ QUESTIONS 1-9: 13
7. TROUBLE CONCENTRATING ON THINGS, SUCH AS READING THE NEWSPAPER OR WATCHING TELEVISION: SEVERAL DAYS
SUM OF ALL RESPONSES TO PHQ QUESTIONS 1-9: 14
SUM OF ALL RESPONSES TO PHQ QUESTIONS 1-9: 14
2. FEELING DOWN, DEPRESSED OR HOPELESS: MORE THAN HALF THE DAYS
4. FEELING TIRED OR HAVING LITTLE ENERGY: MORE THAN HALF THE DAYS
5. POOR APPETITE OR OVEREATING: MORE THAN HALF THE DAYS
3. TROUBLE FALLING OR STAYING ASLEEP: SEVERAL DAYS
9. THOUGHTS THAT YOU WOULD BE BETTER OFF DEAD, OR OF HURTING YOURSELF: SEVERAL DAYS
8. MOVING OR SPEAKING SO SLOWLY THAT OTHER PEOPLE COULD HAVE NOTICED. OR THE OPPOSITE, BEING SO FIGETY OR RESTLESS THAT YOU HAVE BEEN MOVING AROUND A LOT MORE THAN USUAL: MORE THAN HALF THE DAYS
6. FEELING BAD ABOUT YOURSELF - OR THAT YOU ARE A FAILURE OR HAVE LET YOURSELF OR YOUR FAMILY DOWN: SEVERAL DAYS
10. IF YOU CHECKED OFF ANY PROBLEMS, HOW DIFFICULT HAVE THESE PROBLEMS MADE IT FOR YOU TO DO YOUR WORK, TAKE CARE OF THINGS AT HOME, OR GET ALONG WITH OTHER PEOPLE: SOMEWHAT DIFFICULT

## 2025-03-20 ASSESSMENT — COLUMBIA-SUICIDE SEVERITY RATING SCALE - C-SSRS
6. HAVE YOU EVER DONE ANYTHING, STARTED TO DO ANYTHING, OR PREPARED TO DO ANYTHING TO END YOUR LIFE?: NO
2. HAVE YOU ACTUALLY HAD ANY THOUGHTS OF KILLING YOURSELF?: NO
1. WITHIN THE PAST MONTH, HAVE YOU WISHED YOU WERE DEAD OR WISHED YOU COULD GO TO SLEEP AND NOT WAKE UP?: NO

## 2025-03-20 NOTE — PROGRESS NOTES
ADULT BEHAVIORAL HEALTH FOLLOW UP  Jessica Lomeli       Visit Date: 3/20/2025   Time of appointment:  11:06a   Time spent with Patient: 40 minutes.   This is patient's  26  appointment.    Reason for Consult:  Follow-up     Referring Provider/PCP:    No ref. provider found  Oxana Nance, APRN - CNP      Pt provided informed consent for the behavioral health program. Discussed with patient model of service to include the limits of confidentiality (i.e. abuse reporting, suicide intervention, etc.) and short-term intervention focused approach.  Pt indicated understanding.    BUSTER Hackett is a 44 y.o. female who presents for follow up of anxiety. She reported her depression has increased. Therapist utilized MI to assess insight into triggers and stressors. She reported she is still not feeling physically well and this is impacting her mood. Therapist provided active listening and validated feelings. Therapist utilized MI to assess insight into barriers to behavioral changes. She acknowledged she has not fully accepted her POTS dx and how this has changed her lifestyle. Therapist assisted her in confronting maladaptive thinking and provided psychoeducation on how to confront maladaptive thinking and decrease negative self-talk. Therapist assisted her in identifying ways to increase positive self-talk and encouraged her to put mantras on post-its in her room. She was able to identify mantras she could put and was open to trying this skill. Therapist provided psychoeducation on how to work on acceptance behaviors.     Previous Recommendations: Encouraged to work on tracking and bring journal to next session. Encouraged to continue to work on coping/self-care skills, continue to confront maladaptive thinking and utilize support sx             MENTAL STATUS EXAM  Mood was within normal limits with calm affect.   Suicidal ideation was denied.   Homicidal ideation was denied.   Hygiene was fair .  Dress was

## 2025-04-03 ENCOUNTER — OFFICE VISIT (OUTPATIENT)
Dept: BEHAVIORAL/MENTAL HEALTH CLINIC | Age: 45
End: 2025-04-03
Payer: COMMERCIAL

## 2025-04-03 DIAGNOSIS — F41.1 GENERALIZED ANXIETY DISORDER: Primary | ICD-10-CM

## 2025-04-03 PROCEDURE — 90834 PSYTX W PT 45 MINUTES: CPT | Performed by: COUNSELOR

## 2025-04-03 ASSESSMENT — PATIENT HEALTH QUESTIONNAIRE - PHQ9
9. THOUGHTS THAT YOU WOULD BE BETTER OFF DEAD, OR OF HURTING YOURSELF: SEVERAL DAYS
6. FEELING BAD ABOUT YOURSELF - OR THAT YOU ARE A FAILURE OR HAVE LET YOURSELF OR YOUR FAMILY DOWN: SEVERAL DAYS
1. LITTLE INTEREST OR PLEASURE IN DOING THINGS: SEVERAL DAYS
SUM OF ALL RESPONSES TO PHQ QUESTIONS 1-9: 11
10. IF YOU CHECKED OFF ANY PROBLEMS, HOW DIFFICULT HAVE THESE PROBLEMS MADE IT FOR YOU TO DO YOUR WORK, TAKE CARE OF THINGS AT HOME, OR GET ALONG WITH OTHER PEOPLE: SOMEWHAT DIFFICULT
7. TROUBLE CONCENTRATING ON THINGS, SUCH AS READING THE NEWSPAPER OR WATCHING TELEVISION: SEVERAL DAYS
3. TROUBLE FALLING OR STAYING ASLEEP: NOT AT ALL
8. MOVING OR SPEAKING SO SLOWLY THAT OTHER PEOPLE COULD HAVE NOTICED. OR THE OPPOSITE, BEING SO FIGETY OR RESTLESS THAT YOU HAVE BEEN MOVING AROUND A LOT MORE THAN USUAL: MORE THAN HALF THE DAYS
SUM OF ALL RESPONSES TO PHQ QUESTIONS 1-9: 11
2. FEELING DOWN, DEPRESSED OR HOPELESS: SEVERAL DAYS
4. FEELING TIRED OR HAVING LITTLE ENERGY: MORE THAN HALF THE DAYS
5. POOR APPETITE OR OVEREATING: MORE THAN HALF THE DAYS
SUM OF ALL RESPONSES TO PHQ QUESTIONS 1-9: 10
SUM OF ALL RESPONSES TO PHQ QUESTIONS 1-9: 11

## 2025-04-03 ASSESSMENT — ANXIETY QUESTIONNAIRES
4. TROUBLE RELAXING: MORE THAN HALF THE DAYS
GAD7 TOTAL SCORE: 11
7. FEELING AFRAID AS IF SOMETHING AWFUL MIGHT HAPPEN: SEVERAL DAYS
5. BEING SO RESTLESS THAT IT IS HARD TO SIT STILL: MORE THAN HALF THE DAYS
3. WORRYING TOO MUCH ABOUT DIFFERENT THINGS: SEVERAL DAYS
IF YOU CHECKED OFF ANY PROBLEMS ON THIS QUESTIONNAIRE, HOW DIFFICULT HAVE THESE PROBLEMS MADE IT FOR YOU TO DO YOUR WORK, TAKE CARE OF THINGS AT HOME, OR GET ALONG WITH OTHER PEOPLE: SOMEWHAT DIFFICULT
1. FEELING NERVOUS, ANXIOUS, OR ON EDGE: SEVERAL DAYS
2. NOT BEING ABLE TO STOP OR CONTROL WORRYING: MORE THAN HALF THE DAYS
6. BECOMING EASILY ANNOYED OR IRRITABLE: MORE THAN HALF THE DAYS

## 2025-04-03 ASSESSMENT — COLUMBIA-SUICIDE SEVERITY RATING SCALE - C-SSRS
2. HAVE YOU ACTUALLY HAD ANY THOUGHTS OF KILLING YOURSELF?: NO
6. HAVE YOU EVER DONE ANYTHING, STARTED TO DO ANYTHING, OR PREPARED TO DO ANYTHING TO END YOUR LIFE?: NO
1. WITHIN THE PAST MONTH, HAVE YOU WISHED YOU WERE DEAD OR WISHED YOU COULD GO TO SLEEP AND NOT WAKE UP?: NO

## 2025-04-03 NOTE — PROGRESS NOTES
ADULT BEHAVIORAL HEALTH FOLLOW UP  Jessica Lomeli       Visit Date: 4/3/2025   Time of appointment:  11:06a   Time spent with Patient: 42 minutes.   This is patient's  27  appointment.    Reason for Consult:  Follow-up     Referring Provider/PCP:    No ref. provider found  Lena Nova DO      Pt provided informed consent for the behavioral health program. Discussed with patient model of service to include the limits of confidentiality (i.e. abuse reporting, suicide intervention, etc.) and short-term intervention focused approach.  Pt indicated understanding.    BUSTER Hackett is a 44 y.o. female who presents for follow up of anxiety. She continued processing how she is coming to terms with her POTS diagnosis. Therapist provided active listening and validated feelings. She reported she is going to start a new med for the POTS and processed feelings around this. She reported this past week she has been feeling slightly better but does have some health concerns that she is going to address with her PCP after therapy today. She reported she is trying to be more mindful on time management and staying focused at work. She continued processing work stressors and how she is managing this. Therapist utilized MI to assess insight into barriers to behavioral changes. She presented with blocked thinking when processing socialization barriers. She acknowledged she is very \"wishy washy\" with her thinking. Therapist provided psychoeducation on how to incorporate grounding tools to reduce work stressors.     Previous Recommendations: Encouraged to continue to work on confronting maladaptive thinking, work on incorporating positive self-talk and mantras into her routine, encouraged to engage in grounding tools and coping skills to reduce anxious and depressive sx.             MENTAL STATUS EXAM  Mood was within normal limits with calm affect.   Suicidal ideation was denied.   Homicidal ideation was denied.   Hygiene was

## 2025-04-09 ENCOUNTER — OFFICE VISIT (OUTPATIENT)
Dept: PAIN MANAGEMENT | Age: 45
End: 2025-04-09
Payer: COMMERCIAL

## 2025-04-09 VITALS — HEIGHT: 69 IN | WEIGHT: 207 LBS | BODY MASS INDEX: 30.66 KG/M2

## 2025-04-09 DIAGNOSIS — M47.812 CERVICAL SPONDYLOSIS: Primary | ICD-10-CM

## 2025-04-09 PROCEDURE — 99213 OFFICE O/P EST LOW 20 MIN: CPT | Performed by: PAIN MEDICINE

## 2025-04-09 RX ORDER — MIDODRINE HYDROCHLORIDE 2.5 MG/1
2.5 TABLET ORAL 3 TIMES DAILY
COMMUNITY

## 2025-04-09 NOTE — PROGRESS NOTES
(MUCINEX) 600 MG extended release tablet, Take 1 tablet by mouth every 12 hours as needed, Disp: , Rfl:     levonorgestrel (MIRENA) IUD 52 mg, 1 each by IntraUTERine route, Disp: , Rfl:     topiramate (TOPAMAX) 50 MG tablet, , Disp: , Rfl: 3    SUMAtriptan (IMITREX) 50 MG tablet, Take 1 tablet by mouth once as needed for Migraine, Disp: , Rfl:     fexofenadine (ALLEGRA) 180 MG tablet, Take 1 tablet by mouth daily, Disp: , Rfl:     midodrine (PROAMATINE) 2.5 MG tablet, Take 1 tablet by mouth 3 times daily, Disp: , Rfl:     fluticasone (FLONASE) 50 MCG/ACT nasal spray, 1 spray by Nasal route every morning (before breakfast), Disp: 1 each, Rfl: 0    EPINEPHrine (EPIPEN) 0.3 MG/0.3ML SOAJ injection, Inject 0.3 mLs into the muscle as needed (exercise induce anaphlaxis) (Patient not taking: Reported on 12/5/2024), Disp: 1 each, Rfl: 1    Family History   Problem Relation Age of Onset    Diabetes Mother     Cancer Mother         Breast    Hypertension Mother     Other Mother         pacemaker    Heart Attack Father     Arthritis Father     Heart Disease Maternal Grandmother     Diabetes Maternal Grandmother     Cancer Maternal Grandfather     Diabetes Paternal Grandmother     Heart Disease Paternal Grandmother     Diabetes Paternal Grandfather     Heart Disease Paternal Grandfather     Parkinsonism Other        Social History     Socioeconomic History    Marital status: Single     Spouse name: Not on file    Number of children: Not on file    Years of education: Not on file    Highest education level: Not on file   Occupational History    Not on file   Tobacco Use    Smoking status: Never    Smokeless tobacco: Never   Vaping Use    Vaping status: Never Used   Substance and Sexual Activity    Alcohol use: Yes     Alcohol/week: 0.0 standard drinks of alcohol     Comment: occasional    Drug use: No    Sexual activity: Yes     Partners: Male     Birth control/protection: I.U.D.   Other Topics Concern    Not on file   Social

## 2025-04-24 ENCOUNTER — OFFICE VISIT (OUTPATIENT)
Dept: BEHAVIORAL/MENTAL HEALTH CLINIC | Age: 45
End: 2025-04-24
Payer: COMMERCIAL

## 2025-04-24 DIAGNOSIS — F41.1 GENERALIZED ANXIETY DISORDER: Primary | ICD-10-CM

## 2025-04-24 PROCEDURE — 90834 PSYTX W PT 45 MINUTES: CPT | Performed by: COUNSELOR

## 2025-04-24 ASSESSMENT — ANXIETY QUESTIONNAIRES
4. TROUBLE RELAXING: MORE THAN HALF THE DAYS
5. BEING SO RESTLESS THAT IT IS HARD TO SIT STILL: NEARLY EVERY DAY
1. FEELING NERVOUS, ANXIOUS, OR ON EDGE: SEVERAL DAYS
2. NOT BEING ABLE TO STOP OR CONTROL WORRYING: SEVERAL DAYS
7. FEELING AFRAID AS IF SOMETHING AWFUL MIGHT HAPPEN: SEVERAL DAYS
GAD7 TOTAL SCORE: 10
3. WORRYING TOO MUCH ABOUT DIFFERENT THINGS: SEVERAL DAYS
6. BECOMING EASILY ANNOYED OR IRRITABLE: SEVERAL DAYS
IF YOU CHECKED OFF ANY PROBLEMS ON THIS QUESTIONNAIRE, HOW DIFFICULT HAVE THESE PROBLEMS MADE IT FOR YOU TO DO YOUR WORK, TAKE CARE OF THINGS AT HOME, OR GET ALONG WITH OTHER PEOPLE: SOMEWHAT DIFFICULT

## 2025-04-24 ASSESSMENT — PATIENT HEALTH QUESTIONNAIRE - PHQ9
SUM OF ALL RESPONSES TO PHQ QUESTIONS 1-9: 10
1. LITTLE INTEREST OR PLEASURE IN DOING THINGS: SEVERAL DAYS
10. IF YOU CHECKED OFF ANY PROBLEMS, HOW DIFFICULT HAVE THESE PROBLEMS MADE IT FOR YOU TO DO YOUR WORK, TAKE CARE OF THINGS AT HOME, OR GET ALONG WITH OTHER PEOPLE: SOMEWHAT DIFFICULT
SUM OF ALL RESPONSES TO PHQ QUESTIONS 1-9: 10
7. TROUBLE CONCENTRATING ON THINGS, SUCH AS READING THE NEWSPAPER OR WATCHING TELEVISION: SEVERAL DAYS
3. TROUBLE FALLING OR STAYING ASLEEP: MORE THAN HALF THE DAYS
8. MOVING OR SPEAKING SO SLOWLY THAT OTHER PEOPLE COULD HAVE NOTICED. OR THE OPPOSITE, BEING SO FIGETY OR RESTLESS THAT YOU HAVE BEEN MOVING AROUND A LOT MORE THAN USUAL: SEVERAL DAYS
SUM OF ALL RESPONSES TO PHQ QUESTIONS 1-9: 10
SUM OF ALL RESPONSES TO PHQ QUESTIONS 1-9: 10
2. FEELING DOWN, DEPRESSED OR HOPELESS: SEVERAL DAYS
5. POOR APPETITE OR OVEREATING: SEVERAL DAYS
4. FEELING TIRED OR HAVING LITTLE ENERGY: MORE THAN HALF THE DAYS
9. THOUGHTS THAT YOU WOULD BE BETTER OFF DEAD, OR OF HURTING YOURSELF: NOT AT ALL
6. FEELING BAD ABOUT YOURSELF - OR THAT YOU ARE A FAILURE OR HAVE LET YOURSELF OR YOUR FAMILY DOWN: SEVERAL DAYS

## 2025-04-24 NOTE — PROGRESS NOTES
ADULT BEHAVIORAL HEALTH FOLLOW UP  Jessica Lomeli       Visit Date: 4/24/2025   Time of appointment:  11:05a   Time spent with Patient: 50 minutes.   This is patient's  28  appointment.    Reason for Consult:  Follow-up     Referring Provider/PCP:    No ref. provider found  Lena Nova DO      Pt provided informed consent for the behavioral health program. Discussed with patient model of service to include the limits of confidentiality (i.e. abuse reporting, suicide intervention, etc.) and short-term intervention focused approach.  Pt indicated understanding.    BUSTER Hackett is a 44 y.o. female who presents for follow up of anxiety. She processed how the past couple weeks have been. She continued processing work stressors and how she is incorporating coping skills. Therapist provided active listening and validated feelings. She continued processing how she is working on motivation at work. Self-care skills were reviewed. Therapist provided psychoeducation on how to use grounding tools and mantras to increase work-life boundaries. She identified she could work on this. Support sx was reviewed. She identified she is doing better with advocating for herself at work. Therapist validated progress. Therapist assisted her in identifying ways to overcome barriers to goals and increasing hobbies. Interpersonal relationships were reviewed and she identified how she is working on putting her needs first. Therapist validated insight and progress and encouraged her to continue to work on boundaries.     Previous Recommendations: Encouraged to continue to work on confronting maladaptive thinking, using support sx, use coping/self-care skills and incorporate grounding tools into work routine to reduce work stressors.             MENTAL STATUS EXAM  Mood was within normal limits with calm affect.   Suicidal ideation was denied.   Homicidal ideation was denied.   Hygiene was fair .  Dress was appropriate.   Behavior was

## 2025-05-08 ENCOUNTER — OFFICE VISIT (OUTPATIENT)
Dept: BEHAVIORAL/MENTAL HEALTH CLINIC | Age: 45
End: 2025-05-08
Payer: COMMERCIAL

## 2025-05-08 DIAGNOSIS — F41.1 GENERALIZED ANXIETY DISORDER: Primary | ICD-10-CM

## 2025-05-08 PROCEDURE — 90834 PSYTX W PT 45 MINUTES: CPT | Performed by: COUNSELOR

## 2025-05-08 ASSESSMENT — ANXIETY QUESTIONNAIRES
6. BECOMING EASILY ANNOYED OR IRRITABLE: SEVERAL DAYS
GAD7 TOTAL SCORE: 8
3. WORRYING TOO MUCH ABOUT DIFFERENT THINGS: SEVERAL DAYS
7. FEELING AFRAID AS IF SOMETHING AWFUL MIGHT HAPPEN: NOT AT ALL
2. NOT BEING ABLE TO STOP OR CONTROL WORRYING: SEVERAL DAYS
1. FEELING NERVOUS, ANXIOUS, OR ON EDGE: SEVERAL DAYS
5. BEING SO RESTLESS THAT IT IS HARD TO SIT STILL: MORE THAN HALF THE DAYS
IF YOU CHECKED OFF ANY PROBLEMS ON THIS QUESTIONNAIRE, HOW DIFFICULT HAVE THESE PROBLEMS MADE IT FOR YOU TO DO YOUR WORK, TAKE CARE OF THINGS AT HOME, OR GET ALONG WITH OTHER PEOPLE: SOMEWHAT DIFFICULT
4. TROUBLE RELAXING: MORE THAN HALF THE DAYS

## 2025-05-08 ASSESSMENT — PATIENT HEALTH QUESTIONNAIRE - PHQ9
6. FEELING BAD ABOUT YOURSELF - OR THAT YOU ARE A FAILURE OR HAVE LET YOURSELF OR YOUR FAMILY DOWN: NOT AT ALL
2. FEELING DOWN, DEPRESSED OR HOPELESS: NOT AT ALL
8. MOVING OR SPEAKING SO SLOWLY THAT OTHER PEOPLE COULD HAVE NOTICED. OR THE OPPOSITE, BEING SO FIGETY OR RESTLESS THAT YOU HAVE BEEN MOVING AROUND A LOT MORE THAN USUAL: SEVERAL DAYS
9. THOUGHTS THAT YOU WOULD BE BETTER OFF DEAD, OR OF HURTING YOURSELF: NOT AT ALL
3. TROUBLE FALLING OR STAYING ASLEEP: MORE THAN HALF THE DAYS
4. FEELING TIRED OR HAVING LITTLE ENERGY: SEVERAL DAYS
5. POOR APPETITE OR OVEREATING: SEVERAL DAYS
1. LITTLE INTEREST OR PLEASURE IN DOING THINGS: SEVERAL DAYS
SUM OF ALL RESPONSES TO PHQ QUESTIONS 1-9: 7
10. IF YOU CHECKED OFF ANY PROBLEMS, HOW DIFFICULT HAVE THESE PROBLEMS MADE IT FOR YOU TO DO YOUR WORK, TAKE CARE OF THINGS AT HOME, OR GET ALONG WITH OTHER PEOPLE: SOMEWHAT DIFFICULT
7. TROUBLE CONCENTRATING ON THINGS, SUCH AS READING THE NEWSPAPER OR WATCHING TELEVISION: SEVERAL DAYS
SUM OF ALL RESPONSES TO PHQ QUESTIONS 1-9: 7

## 2025-05-08 NOTE — PROGRESS NOTES
ADULT BEHAVIORAL HEALTH FOLLOW UP  Jessica Lomeli       Visit Date: 5/8/2025   Time of appointment:  11:04a   Time spent with Patient: 44 minutes.   This is patient's  29  appointment.    Reason for Consult:  Follow-up     Referring Provider/PCP:    No ref. provider found  Lena Nova DO      Pt provided informed consent for the behavioral health program. Discussed with patient model of service to include the limits of confidentiality (i.e. abuse reporting, suicide intervention, etc.) and short-term intervention focused approach.  Pt indicated understanding.    BUSTER Hackett is a 44 y.o. female who presents for follow up of anxiety. She continued processing stressors. Therapist provided active listening and validated feelings. She reported she has had car stressors and this impacted her mood. She reported she is going to the [employer] to see about getting a car loan and is anxious around this. Therapist assisted her in identifying ways to reduce anxious sx. She continued processing interpersonal relationship stressors. She continued processing work stressors and acknowledged she is thinking about finding another job. Therapist validated feelings and encouraged her to work on goals.     Previous Recommendations: Encouraged to work on confronting maladaptive thinking, using boundaries identified, use grounding tools to reduce anxious and depressive sx, continue to use communication tools to increase advocacy abilities.             MENTAL STATUS EXAM  Mood was within normal limits with calm affect.   Suicidal ideation was denied.   Homicidal ideation was denied.   Hygiene was fair .  Dress was appropriate.   Behavior was Within Normal Limits with No observation or self-report of difficulties ambulating.   Attitude was Cooperative.  Eye-contact was fair.  Speech: rate - WNL, rhythm - WNL, volume - WNL.  Verbalizations were goal directed.  Thought processes were intact and goal-oriented without evidence of

## 2025-05-09 ENCOUNTER — OFFICE VISIT (OUTPATIENT)
Dept: PAIN MANAGEMENT | Age: 45
End: 2025-05-09
Payer: COMMERCIAL

## 2025-05-09 VITALS — BODY MASS INDEX: 31.37 KG/M2 | WEIGHT: 207 LBS | HEIGHT: 68 IN

## 2025-05-09 DIAGNOSIS — M47.812 CERVICAL SPONDYLOSIS: Primary | ICD-10-CM

## 2025-05-09 DIAGNOSIS — M50.30 DEGENERATIVE DISC DISEASE, CERVICAL: ICD-10-CM

## 2025-05-09 DIAGNOSIS — M54.81 BILATERAL OCCIPITAL NEURALGIA: ICD-10-CM

## 2025-05-09 PROCEDURE — 99215 OFFICE O/P EST HI 40 MIN: CPT | Performed by: STUDENT IN AN ORGANIZED HEALTH CARE EDUCATION/TRAINING PROGRAM

## 2025-05-09 NOTE — PROGRESS NOTES
Chronic Pain Clinic Note     Encounter Date: 5/9/2025     SUBJECTIVE:  Chief Complaint   Patient presents with    Neck Pain       History of Present Illness:   Roxann Diggs is a 44 y.o. female who presents with neck pain    Medication Refill: n/a    Current Complaints of Pain:   Location: neck    Radiation: no  Severity: Moderate  Pain Numerical Score - 5 today    Average: 5/6     Highest: 9  Lowest: 0  Character/Quality: Complains of pain that is aching  Timing: Constant  Associated symptoms: none  Numbness: no  Weakness: no  Exacerbating factors: pain is just there  Alleviating factors: nothing   Length of time pain has been present: Started about 2013/2014  Inciting event/injury: no  Bowel/Bladder incontinence: no  Falls: none in the past month   Physical Therapy: yes    History of Interventions:   Surgery: No previous lumbar/cervical surgeries  Injections: RFA 02/27/2025    Imaging:    MRI Cervical 08/14/2025    Past Medical History:   Diagnosis Date    Acne     Allergic rhinitis     Chickenpox     Depression     Esophageal reflux     Functional dyspepsia     GERD (gastroesophageal reflux disease) 01/03/2012    Headache(784.0)     HPV (human papilloma virus) anogenital infection     Hyperlipidemia     Lichen     PONV (postoperative nausea and vomiting)     Syncope     Unspecified constipation     Unspecified sleep apnea        Past Surgical History:   Procedure Laterality Date    BREAST SURGERY Left     cyst removed    CAPSULE ENDOSCOPY N/A 8/30/2023    EGD, BRAVO PLACEMENT performed by Chapincito Davis DO at [facility] OR    COLONOSCOPY N/A 05/14/2014    RECTAL BIOPSY, normal mucosa    COLPOSCOPY  2017    ENDOSCOPY, COLON, DIAGNOSTIC      INTRAUTERINE DEVICE INSERTION  05/13/2015    Mirena    VT COLPOSCOPY CERVIX VAG LOOP ELTRD BX CERVIX N/A 07/05/2018    LEEP performed by Yonathan Alvarado DO at [facility] OR    SINUS SURGERY      UPPER GASTROINTESTINAL ENDOSCOPY  12/10/2014    Small hiatal hernia. esophageal ring

## 2025-05-14 DIAGNOSIS — F32.A DEPRESSION, UNSPECIFIED DEPRESSION TYPE: ICD-10-CM

## 2025-05-14 DIAGNOSIS — F41.9 ANXIETY: ICD-10-CM

## 2025-05-14 RX ORDER — FLUVOXAMINE MALEATE 50 MG
50 TABLET ORAL NIGHTLY
Qty: 90 TABLET | Refills: 0 | OUTPATIENT
Start: 2025-05-14 | End: 2026-05-14

## 2025-06-05 ENCOUNTER — OFFICE VISIT (OUTPATIENT)
Dept: BEHAVIORAL/MENTAL HEALTH CLINIC | Age: 45
End: 2025-06-05
Payer: COMMERCIAL

## 2025-06-05 DIAGNOSIS — F41.1 GENERALIZED ANXIETY DISORDER: Primary | ICD-10-CM

## 2025-06-05 PROCEDURE — 90834 PSYTX W PT 45 MINUTES: CPT | Performed by: COUNSELOR

## 2025-06-05 ASSESSMENT — PATIENT HEALTH QUESTIONNAIRE - PHQ9
SUM OF ALL RESPONSES TO PHQ QUESTIONS 1-9: 10
5. POOR APPETITE OR OVEREATING: MORE THAN HALF THE DAYS
4. FEELING TIRED OR HAVING LITTLE ENERGY: NEARLY EVERY DAY
SUM OF ALL RESPONSES TO PHQ QUESTIONS 1-9: 10
6. FEELING BAD ABOUT YOURSELF - OR THAT YOU ARE A FAILURE OR HAVE LET YOURSELF OR YOUR FAMILY DOWN: NOT AT ALL
10. IF YOU CHECKED OFF ANY PROBLEMS, HOW DIFFICULT HAVE THESE PROBLEMS MADE IT FOR YOU TO DO YOUR WORK, TAKE CARE OF THINGS AT HOME, OR GET ALONG WITH OTHER PEOPLE: NOT DIFFICULT AT ALL
1. LITTLE INTEREST OR PLEASURE IN DOING THINGS: NOT AT ALL
7. TROUBLE CONCENTRATING ON THINGS, SUCH AS READING THE NEWSPAPER OR WATCHING TELEVISION: SEVERAL DAYS
2. FEELING DOWN, DEPRESSED OR HOPELESS: SEVERAL DAYS
SUM OF ALL RESPONSES TO PHQ QUESTIONS 1-9: 10
SUM OF ALL RESPONSES TO PHQ QUESTIONS 1-9: 10
8. MOVING OR SPEAKING SO SLOWLY THAT OTHER PEOPLE COULD HAVE NOTICED. OR THE OPPOSITE, BEING SO FIGETY OR RESTLESS THAT YOU HAVE BEEN MOVING AROUND A LOT MORE THAN USUAL: SEVERAL DAYS
3. TROUBLE FALLING OR STAYING ASLEEP: MORE THAN HALF THE DAYS
9. THOUGHTS THAT YOU WOULD BE BETTER OFF DEAD, OR OF HURTING YOURSELF: NOT AT ALL

## 2025-06-05 NOTE — PROGRESS NOTES
ADULT BEHAVIORAL HEALTH FOLLOW UP  Jessica Lomeli       Visit Date: 6/5/2025   Time of appointment:  11:05a   Time spent with Patient: 50 minutes.   This is patient's  30  appointment.    Reason for Consult:  Follow-up     Referring Provider/PCP:    No ref. provider found  Lena Nova DO      Pt provided informed consent for the behavioral health program. Discussed with patient model of service to include the limits of confidentiality (i.e. abuse reporting, suicide intervention, etc.) and short-term intervention focused approach.  Pt indicated understanding.    BUSTER Hackett is a 44 y.o. female who presents for follow up of anxiety. She reported she finally got a new car and processed how she handled stressors around this. Therapist provided active listening and validated feelings. She continued processing interpersonal relationship stressors. She continued processing work stressors and how she is managing workload. She reported she is thinking about finding another job now that she has a car payment. She reported she has been hanging out with new people and processed uncertainty around this. Therapist utilized MI to assess insight into barriers to behavioral changes. She was encouraged to work on smaller goals identified. Therapist provided psychoeducation on how to increase confidence and confront maladaptive thinking.     Previous Recommendations: Encouraged to continue to use coping skills to reduce anxiety, use self-care, confront maladaptive thinking and work on goals.             MENTAL STATUS EXAM  Mood was within normal limits with calm affect.   Suicidal ideation was denied.   Homicidal ideation was denied.   Hygiene was fair .  Dress was appropriate.   Behavior was Within Normal Limits with No observation or self-report of difficulties ambulating.   Attitude was Cooperative.  Eye-contact was fair.  Speech: rate - WNL, rhythm - WNL, volume - WNL.  Verbalizations were goal directed.  Thought

## 2025-06-10 ENCOUNTER — HOSPITAL ENCOUNTER (OUTPATIENT)
Dept: PAIN MANAGEMENT | Facility: CLINIC | Age: 45
Discharge: HOME | End: 2025-06-10
Payer: COMMERCIAL

## 2025-06-10 VITALS
BODY MASS INDEX: 31.67 KG/M2 | DIASTOLIC BLOOD PRESSURE: 68 MMHG | HEIGHT: 68 IN | SYSTOLIC BLOOD PRESSURE: 110 MMHG | WEIGHT: 209 LBS | TEMPERATURE: 98.3 F | OXYGEN SATURATION: 100 % | HEART RATE: 95 BPM | RESPIRATION RATE: 12 BRPM

## 2025-06-10 DIAGNOSIS — R52 PAIN MANAGEMENT: ICD-10-CM

## 2025-06-10 LAB — HCG, PREGNANCY URINE (POC): NEGATIVE

## 2025-06-10 PROCEDURE — 64491 INJ PARAVERT F JNT C/T 2 LEV: CPT

## 2025-06-10 PROCEDURE — 64491 INJ PARAVERT F JNT C/T 2 LEV: CPT | Performed by: STUDENT IN AN ORGANIZED HEALTH CARE EDUCATION/TRAINING PROGRAM

## 2025-06-10 PROCEDURE — 64490 INJ PARAVERT F JNT C/T 1 LEV: CPT

## 2025-06-10 PROCEDURE — 99152 MOD SED SAME PHYS/QHP 5/>YRS: CPT | Performed by: STUDENT IN AN ORGANIZED HEALTH CARE EDUCATION/TRAINING PROGRAM

## 2025-06-10 PROCEDURE — 6360000002 HC RX W HCPCS: Performed by: STUDENT IN AN ORGANIZED HEALTH CARE EDUCATION/TRAINING PROGRAM

## 2025-06-10 PROCEDURE — 64490 INJ PARAVERT F JNT C/T 1 LEV: CPT | Performed by: STUDENT IN AN ORGANIZED HEALTH CARE EDUCATION/TRAINING PROGRAM

## 2025-06-10 PROCEDURE — 81025 URINE PREGNANCY TEST: CPT

## 2025-06-10 RX ORDER — LIDOCAINE HYDROCHLORIDE 20 MG/ML
INJECTION, SOLUTION EPIDURAL; INFILTRATION; INTRACAUDAL; PERINEURAL
Status: COMPLETED | OUTPATIENT
Start: 2025-06-10 | End: 2025-06-10

## 2025-06-10 RX ORDER — MIDAZOLAM HYDROCHLORIDE 2 MG/2ML
INJECTION, SOLUTION INTRAMUSCULAR; INTRAVENOUS
Status: COMPLETED | OUTPATIENT
Start: 2025-06-10 | End: 2025-06-10

## 2025-06-10 RX ADMIN — MIDAZOLAM HYDROCHLORIDE 2 MG: 1 INJECTION, SOLUTION INTRAMUSCULAR; INTRAVENOUS at 09:28

## 2025-06-10 RX ADMIN — LIDOCAINE HYDROCHLORIDE 5 ML: 20 INJECTION, SOLUTION EPIDURAL; INFILTRATION; INTRACAUDAL; PERINEURAL at 09:31

## 2025-06-10 ASSESSMENT — PAIN DESCRIPTION - DESCRIPTORS: DESCRIPTORS: DISCOMFORT

## 2025-06-10 ASSESSMENT — PAIN SCALES - GENERAL: PAINLEVEL_OUTOF10: 2

## 2025-06-10 NOTE — OP NOTE
PROCEDURE PERFORMED: Bilateral Cervical Medial Branch Block using Fluoroscopy    PREOPERATIVE DIAGNOSIS: Bilateral neck pain/cervical spondylosis    INDICATIONS: Chronic neck pain    The patient's history and physical exam were reviewed.  The risk, benefits, and alternatives of the procedure were discussed and all questions were answered to the patient's satisfaction.  The patient agreed to proceed and written informed consent was obtained.    POSTOPERATIVE DIAGNOSIS: Same    PHYSICIAN:  Dr. Delon Obregon DO    ANESTHESIA:  LOCAL    ASSISTANT:  NONE    PATHOLOGY:  NONE    ESTIMATED BLOOD LOSS:  N/A    IMPLANTS:  NONE    PROCEDURE DESCRIPTION: Diagnostic bilateral cervical medial branch block using fluoroscopy    The patient was placed on the operative bed in prone position.  The area was prepped with  Betadine.  The area was then draped in a sterile fashion.    Targeted levels: Bilateral cervical C2, C3, C4 medial branch block    An AP  fluoroscopic film was obtained to identify the levels.  At each cervical medial branch, a 25-gauge 3-1/2inch needle was inserted under AP fluoroscopic projections until bone was contacted.  Then the needle tip was advanced to contact the centroid of the lateral articular pillar at each respective level.  Aspiration of each needle was negative for blood, CSF and paresthesia prior to injection.  The nerve block was then performed by injecting 0.5 mL lidocaine 2% through each needle.  The needles were then removed and the needle sites were dressed appropriately. The same procedure was performed on the opposite side. The patient did not experience any hemodynamic or neurologic sequelae.    The patient was transferred to the postoperative care unit in stable condition.  Written discharge instructions were given to the patient.  A pain diary was given to the patient upon discharge.    COMPLICATIONS:  There were no apparent complications.  The patient tolerated the procedure well.

## 2025-06-10 NOTE — H&P
Pain Pre-Op H&P Note    SUBJECTIVE:  No chief complaint on file.      History of Present Illness:   Roxann Diggs is a 44 y.o. female who presents with neck pain.    Past Medical History:   Diagnosis Date    Acne     Allergic rhinitis     Chickenpox     Depression     Esophageal reflux     Functional dyspepsia     GERD (gastroesophageal reflux disease) 01/03/2012    Headache(784.0)     HPV (human papilloma virus) anogenital infection     Hyperlipidemia     Lichen     PONV (postoperative nausea and vomiting)     Syncope     Unspecified constipation     Unspecified sleep apnea        Past Surgical History:   Procedure Laterality Date    BREAST SURGERY Left     cyst removed    CAPSULE ENDOSCOPY N/A 8/30/2023    EGD, BRAVO PLACEMENT performed by Chapincito Davis DO at [facility] OR    COLONOSCOPY N/A 05/14/2014    RECTAL BIOPSY, normal mucosa    COLPOSCOPY  2017    ENDOSCOPY, COLON, DIAGNOSTIC      INTRAUTERINE DEVICE INSERTION  05/13/2015    Mirena    LA COLPOSCOPY CERVIX VAG LOOP ELTRD BX CERVIX N/A 07/05/2018    LEEP performed by Yonathan Alvarado DO at [facility] OR    SINUS SURGERY      UPPER GASTROINTESTINAL ENDOSCOPY  12/10/2014    Small hiatal hernia. esophageal ring     WISDOM TOOTH EXTRACTION         Family History   Problem Relation Age of Onset    Diabetes Mother     Cancer Mother         Breast    Hypertension Mother     Other Mother         pacemaker    Heart Attack Father     Arthritis Father     Heart Disease Maternal Grandmother     Diabetes Maternal Grandmother     Cancer Maternal Grandfather     Diabetes Paternal Grandmother     Heart Disease Paternal Grandmother     Diabetes Paternal Grandfather     Heart Disease Paternal Grandfather     Parkinsonism Other        Social History     Socioeconomic History    Marital status: Single     Spouse name: Not on file    Number of children: Not on file    Years of education: Not on file    Highest education level: Not on file   Occupational History    Not on file

## 2025-06-10 NOTE — DISCHARGE INSTRUCTIONS
You have received a sedative/anesthetic therefore you should not consume any alcoholic beverages for 24 hours.  Do not drive or operate machinery for 24 hours.    Do not take a tub bath for 72 hours after procedure (this includes hot tubs).  You may shower, but avoid hot water to injection site.   Avoid strenuous activity TODAY especially if you experience dizziness.   Remove band-aid the next day.    Wash off any residual iodine 24 hours from today.   Do not use heat, heating pad, or any other heating device over the injection site for 3 days after the procedure.    If you experience pain after your procedure, you may continue with your current pain medication as prescribed.  (DO NOT INCREASE YOUR PAIN MEDICATION WITHOUT TALKING TO DOCTOR)  Soreness and pain at injection site is common, may use ice to reduce soreness.    Please complete pain diary as instructed. Office staff will contact you to review results.    Call [facility] at [phone] if you experience:   Fever, chills or temperature over 100    Vomiting, headache, persistent stiff neck, nausea or blurred vision   Difficulty urinating or unable to urinate within 8 hours   Increase in weakness, numbness or loss of function of limbs  Increased redness, swelling or drainage at the injection site

## 2025-06-11 ENCOUNTER — TELEPHONE (OUTPATIENT)
Dept: PAIN MANAGEMENT | Age: 45
End: 2025-06-11

## 2025-06-11 NOTE — TELEPHONE ENCOUNTER
S/P: Bilateral Cervical Medial Branch Block using Fluoroscopy         DOS: 06/10/25    Pain  before procedure with activity : 7    Pain after procedure with activity: 3    Activities following procedure include: Folded 5 loads of laundry, house work     % of pain relief: 70%    Procedure successful: No    OV scheduled: 06/19/25

## 2025-06-12 ENCOUNTER — TELEPHONE (OUTPATIENT)
Dept: PAIN MANAGEMENT | Age: 45
End: 2025-06-12

## 2025-06-19 ENCOUNTER — TELEMEDICINE (OUTPATIENT)
Dept: PAIN MANAGEMENT | Age: 45
End: 2025-06-19
Payer: COMMERCIAL

## 2025-06-19 DIAGNOSIS — M47.812 CERVICAL SPONDYLOSIS: Primary | ICD-10-CM

## 2025-06-19 DIAGNOSIS — R51.9 OCCIPITAL HEADACHE: ICD-10-CM

## 2025-06-19 DIAGNOSIS — M50.30 DEGENERATIVE DISC DISEASE, CERVICAL: ICD-10-CM

## 2025-06-19 PROCEDURE — 99214 OFFICE O/P EST MOD 30 MIN: CPT | Performed by: NURSE PRACTITIONER

## 2025-06-19 ASSESSMENT — ENCOUNTER SYMPTOMS
SHORTNESS OF BREATH: 0
COUGH: 0
CONSTIPATION: 0

## 2025-06-19 NOTE — PROGRESS NOTES
MD Chapincito, Neurosurgery, [place]          Treatment Plan:  Pt states Bilateral cervical MBB C2, C3, C4 made her pain worse  Discussed different treatment options including continued conservative care such as physical therapy, chiropractic care, acupuncture.  Discussed surgical evaluation. She would like a referral  Follow up appointment made for 6 weeks    I have reviewed the chief complaint and history of present illness (including ROS and PFSH) and vital documentation by my staff and I agree with their documentation and have added where applicable.

## 2025-06-26 ENCOUNTER — OFFICE VISIT (OUTPATIENT)
Dept: BEHAVIORAL/MENTAL HEALTH CLINIC | Age: 45
End: 2025-06-26
Payer: COMMERCIAL

## 2025-06-26 DIAGNOSIS — F41.1 GENERALIZED ANXIETY DISORDER: Primary | ICD-10-CM

## 2025-06-26 PROCEDURE — 90834 PSYTX W PT 45 MINUTES: CPT | Performed by: COUNSELOR

## 2025-06-26 ASSESSMENT — PATIENT HEALTH QUESTIONNAIRE - PHQ9
SUM OF ALL RESPONSES TO PHQ QUESTIONS 1-9: 5
8. MOVING OR SPEAKING SO SLOWLY THAT OTHER PEOPLE COULD HAVE NOTICED. OR THE OPPOSITE, BEING SO FIGETY OR RESTLESS THAT YOU HAVE BEEN MOVING AROUND A LOT MORE THAN USUAL: SEVERAL DAYS
3. TROUBLE FALLING OR STAYING ASLEEP: SEVERAL DAYS
SUM OF ALL RESPONSES TO PHQ QUESTIONS 1-9: 5
9. THOUGHTS THAT YOU WOULD BE BETTER OFF DEAD, OR OF HURTING YOURSELF: NOT AT ALL
SUM OF ALL RESPONSES TO PHQ QUESTIONS 1-9: 5
SUM OF ALL RESPONSES TO PHQ QUESTIONS 1-9: 5
4. FEELING TIRED OR HAVING LITTLE ENERGY: SEVERAL DAYS
7. TROUBLE CONCENTRATING ON THINGS, SUCH AS READING THE NEWSPAPER OR WATCHING TELEVISION: SEVERAL DAYS
5. POOR APPETITE OR OVEREATING: SEVERAL DAYS
2. FEELING DOWN, DEPRESSED OR HOPELESS: NOT AT ALL
1. LITTLE INTEREST OR PLEASURE IN DOING THINGS: NOT AT ALL
10. IF YOU CHECKED OFF ANY PROBLEMS, HOW DIFFICULT HAVE THESE PROBLEMS MADE IT FOR YOU TO DO YOUR WORK, TAKE CARE OF THINGS AT HOME, OR GET ALONG WITH OTHER PEOPLE: SOMEWHAT DIFFICULT
6. FEELING BAD ABOUT YOURSELF - OR THAT YOU ARE A FAILURE OR HAVE LET YOURSELF OR YOUR FAMILY DOWN: NOT AT ALL

## 2025-06-26 NOTE — PROGRESS NOTES
ADULT BEHAVIORAL HEALTH FOLLOW UP  Jessica Lomeli       Visit Date: 6/26/2025   Time of appointment:  8:04a   Time spent with Patient: 50 minutes.   This is patient's 31 appointment.    Reason for Consult:  Follow-up     Referring Provider/PCP:    No ref. provider found  Lena Nova DO      Pt provided informed consent for the behavioral health program. Discussed with patient model of service to include the limits of confidentiality (i.e. abuse reporting, suicide intervention, etc.) and short-term intervention focused approach.  Pt indicated understanding.    BUSTER Hackett is a 44 y.o. female who presents for follow up of anxiety. She processed how she is handling the heat wave. She continued processing interpersonal relationship stressors. Therapist provided active listening and validated feelings. She continued processing financial stressors. She continued processing work stressors and how she is continuing to set more realistic expectations for herself. Therapist utilized MI to assess insight into barriers to behavioral changes. She continued processing how she is trying to stay firm in her boundaries. Therapist provided psychoeducation on how to process emotions and engage in healthier thinking patterns. Therapist assisted her in practicing healthier coping skills in session and allowing herself to process emotions in a healthier manner. She acknowledged it is hard for her to process feelings but was open to trying tools. Therapist assisted her in setting more realistic goals and encouraged to work on skill.     Previous Recommendations: Encouraged to work on self-esteem goals, confront maladaptive thinking, work on self-care/coping skills, work on work goals and increase socialization skills.             MENTAL STATUS EXAM  Mood was within normal limits with calm affect.   Suicidal ideation was denied.   Homicidal ideation was denied.   Hygiene was fair .  Dress was appropriate.   Behavior was Within

## 2025-07-10 ENCOUNTER — OFFICE VISIT (OUTPATIENT)
Dept: BEHAVIORAL/MENTAL HEALTH CLINIC | Age: 45
End: 2025-07-10
Payer: COMMERCIAL

## 2025-07-10 DIAGNOSIS — F41.1 GENERALIZED ANXIETY DISORDER: Primary | ICD-10-CM

## 2025-07-10 PROCEDURE — 90834 PSYTX W PT 45 MINUTES: CPT | Performed by: COUNSELOR

## 2025-07-10 ASSESSMENT — PATIENT HEALTH QUESTIONNAIRE - PHQ9
3. TROUBLE FALLING OR STAYING ASLEEP: MORE THAN HALF THE DAYS
6. FEELING BAD ABOUT YOURSELF - OR THAT YOU ARE A FAILURE OR HAVE LET YOURSELF OR YOUR FAMILY DOWN: SEVERAL DAYS
SUM OF ALL RESPONSES TO PHQ QUESTIONS 1-9: 8
5. POOR APPETITE OR OVEREATING: SEVERAL DAYS
10. IF YOU CHECKED OFF ANY PROBLEMS, HOW DIFFICULT HAVE THESE PROBLEMS MADE IT FOR YOU TO DO YOUR WORK, TAKE CARE OF THINGS AT HOME, OR GET ALONG WITH OTHER PEOPLE: SOMEWHAT DIFFICULT
8. MOVING OR SPEAKING SO SLOWLY THAT OTHER PEOPLE COULD HAVE NOTICED. OR THE OPPOSITE, BEING SO FIGETY OR RESTLESS THAT YOU HAVE BEEN MOVING AROUND A LOT MORE THAN USUAL: SEVERAL DAYS
1. LITTLE INTEREST OR PLEASURE IN DOING THINGS: NOT AT ALL
9. THOUGHTS THAT YOU WOULD BE BETTER OFF DEAD, OR OF HURTING YOURSELF: NOT AT ALL
4. FEELING TIRED OR HAVING LITTLE ENERGY: MORE THAN HALF THE DAYS
SUM OF ALL RESPONSES TO PHQ QUESTIONS 1-9: 8
SUM OF ALL RESPONSES TO PHQ QUESTIONS 1-9: 8
7. TROUBLE CONCENTRATING ON THINGS, SUCH AS READING THE NEWSPAPER OR WATCHING TELEVISION: SEVERAL DAYS
2. FEELING DOWN, DEPRESSED OR HOPELESS: NOT AT ALL
SUM OF ALL RESPONSES TO PHQ QUESTIONS 1-9: 8

## 2025-07-10 NOTE — PROGRESS NOTES
rhythm - WNL, volume - WNL.  Verbalizations were goal directed.  Thought processes were intact and goal-oriented without evidence of delusions, hallucinations, obsessions, or jose; without no cognitive distortions.   Associations were characterized by intact cognitive processes.  Pt was oriented to person, place, time, and general circumstances;  recent:  fair.  Insight and judgment were estimated to be fair, AEB, a fair  understanding of cyclical maladaptive patterns, and the ability to use insight to inform behavior change.   Attention span and concentration appear within functional limits  Language use and knowledge base appear within functional limits        ASSESSMENT  Roxann presented to the appointment today for evaluation and treatment of symptoms of anxiety.  She is currently deemed no risk to herself or others and meets criteria for HONEY.  Roxann  was in agreement with recommendations.        7/10/2025    11:07 AM 6/26/2025     8:05 AM 6/5/2025    11:06 AM 5/8/2025    11:06 AM 4/24/2025    11:07 AM 4/3/2025    11:07 AM 3/20/2025    11:08 AM   PHQ Scores   PHQ2 Score 0 0 1 1 2 2 4   PHQ9 Score 8 5 10 7 10 11 14     Interpretation of Total Score Depression Severity: 1-4 = Minimal depression, 5-9 = Mild depression, 10-14 = Moderate depression, 15-19 = Moderately severe depression, 20-27 = Severe depression    How often pt has had thoughts of death or hurting self (if PHQ positive for depression):  Not at all        7/10/2025    11:07 AM 6/26/2025     8:05 AM 6/5/2025    11:07 AM 5/8/2025    11:06 AM 4/24/2025    11:07 AM 4/3/2025    11:08 AM 3/20/2025    11:08 AM   HONEY 7 SCORE   HONEY-7 Total Score 9 5 9 8 10 11 12     Interpretation of HONEY-7 score: 5-9 = mild anxiety, 10-14 = moderate anxiety, 15+ = severe anxiety. Recommend referral to behavioral health for scores 10 or greater.      DIAGNOSIS  Roxann was seen today for follow-up.    Diagnoses and all orders for this visit:    Generalized anxiety

## 2025-07-24 ENCOUNTER — OFFICE VISIT (OUTPATIENT)
Dept: BEHAVIORAL/MENTAL HEALTH CLINIC | Age: 45
End: 2025-07-24
Payer: COMMERCIAL

## 2025-07-24 DIAGNOSIS — F41.1 GENERALIZED ANXIETY DISORDER: Primary | ICD-10-CM

## 2025-07-24 PROCEDURE — 90834 PSYTX W PT 45 MINUTES: CPT | Performed by: COUNSELOR

## 2025-07-24 ASSESSMENT — PATIENT HEALTH QUESTIONNAIRE - PHQ9
4. FEELING TIRED OR HAVING LITTLE ENERGY: SEVERAL DAYS
10. IF YOU CHECKED OFF ANY PROBLEMS, HOW DIFFICULT HAVE THESE PROBLEMS MADE IT FOR YOU TO DO YOUR WORK, TAKE CARE OF THINGS AT HOME, OR GET ALONG WITH OTHER PEOPLE: SOMEWHAT DIFFICULT
SUM OF ALL RESPONSES TO PHQ QUESTIONS 1-9: 5
SUM OF ALL RESPONSES TO PHQ QUESTIONS 1-9: 5
9. THOUGHTS THAT YOU WOULD BE BETTER OFF DEAD, OR OF HURTING YOURSELF: NOT AT ALL
8. MOVING OR SPEAKING SO SLOWLY THAT OTHER PEOPLE COULD HAVE NOTICED. OR THE OPPOSITE, BEING SO FIGETY OR RESTLESS THAT YOU HAVE BEEN MOVING AROUND A LOT MORE THAN USUAL: SEVERAL DAYS
3. TROUBLE FALLING OR STAYING ASLEEP: SEVERAL DAYS
SUM OF ALL RESPONSES TO PHQ QUESTIONS 1-9: 5
7. TROUBLE CONCENTRATING ON THINGS, SUCH AS READING THE NEWSPAPER OR WATCHING TELEVISION: SEVERAL DAYS
2. FEELING DOWN, DEPRESSED OR HOPELESS: NOT AT ALL
1. LITTLE INTEREST OR PLEASURE IN DOING THINGS: NOT AT ALL
6. FEELING BAD ABOUT YOURSELF - OR THAT YOU ARE A FAILURE OR HAVE LET YOURSELF OR YOUR FAMILY DOWN: NOT AT ALL
5. POOR APPETITE OR OVEREATING: SEVERAL DAYS
SUM OF ALL RESPONSES TO PHQ QUESTIONS 1-9: 5

## 2025-07-24 NOTE — PROGRESS NOTES
ADULT BEHAVIORAL HEALTH FOLLOW UP  Jessica Lomeli       Visit Date: 7/24/2025   Time of appointment:  10:09a   Time spent with Patient: 46 minutes.   This is patient's 33 appointment.    Reason for Consult:  Follow-up     Referring Provider/PCP:    No ref. provider found  Lena Nova DO      Pt provided informed consent for the behavioral health program. Discussed with patient model of service to include the limits of confidentiality (i.e. abuse reporting, suicide intervention, etc.) and short-term intervention focused approach.  Pt indicated understanding.    BUSTER Hackett is a 44 y.o. female who presents for follow up of anxiety. She processed past couple weeks. Therapist provided active listening and validated feelings. She continued processing grief/loss and how she handled anniversary dates around dad. Therapist utilized MI to assess insight into barriers to behavioral changes. Therapist provided psychoeducation on how to process emotions and reframe maladaptive thinking. She reported she is trying to use her coping skills and be more mindful of irritability. Therapist validated insight and progress. Interpersonal relationships were processed. Therapist assisted her in confronting maladaptive thinking and identifying barriers to advocating for herself. Therapist provided psychoeducation on how to boundary set and use healthier communication tools. She reported she is thinking more about finding a new job and processed financial stressors.     Previous Recommendations: Encouraged to work on processing emotions, working through grief/loss, utilizing support sx, continue to use self-care and coping skills.             MENTAL STATUS EXAM  Mood was within normal limits with calm affect.   Suicidal ideation was denied.   Homicidal ideation was denied.   Hygiene was fair .  Dress was appropriate.   Behavior was Within Normal Limits with No observation or self-report of difficulties ambulating.   Attitude

## 2025-08-07 ENCOUNTER — OFFICE VISIT (OUTPATIENT)
Dept: BEHAVIORAL/MENTAL HEALTH CLINIC | Age: 45
End: 2025-08-07
Payer: COMMERCIAL

## 2025-08-07 DIAGNOSIS — F41.1 GENERALIZED ANXIETY DISORDER: Primary | ICD-10-CM

## 2025-08-07 PROCEDURE — 90834 PSYTX W PT 45 MINUTES: CPT | Performed by: COUNSELOR

## 2025-08-07 ASSESSMENT — PATIENT HEALTH QUESTIONNAIRE - PHQ9
5. POOR APPETITE OR OVEREATING: SEVERAL DAYS
3. TROUBLE FALLING OR STAYING ASLEEP: MORE THAN HALF THE DAYS
1. LITTLE INTEREST OR PLEASURE IN DOING THINGS: NOT AT ALL
8. MOVING OR SPEAKING SO SLOWLY THAT OTHER PEOPLE COULD HAVE NOTICED. OR THE OPPOSITE, BEING SO FIGETY OR RESTLESS THAT YOU HAVE BEEN MOVING AROUND A LOT MORE THAN USUAL: SEVERAL DAYS
SUM OF ALL RESPONSES TO PHQ QUESTIONS 1-9: 7
6. FEELING BAD ABOUT YOURSELF - OR THAT YOU ARE A FAILURE OR HAVE LET YOURSELF OR YOUR FAMILY DOWN: NOT AT ALL
10. IF YOU CHECKED OFF ANY PROBLEMS, HOW DIFFICULT HAVE THESE PROBLEMS MADE IT FOR YOU TO DO YOUR WORK, TAKE CARE OF THINGS AT HOME, OR GET ALONG WITH OTHER PEOPLE: SOMEWHAT DIFFICULT
SUM OF ALL RESPONSES TO PHQ QUESTIONS 1-9: 7
4. FEELING TIRED OR HAVING LITTLE ENERGY: MORE THAN HALF THE DAYS
SUM OF ALL RESPONSES TO PHQ QUESTIONS 1-9: 7
SUM OF ALL RESPONSES TO PHQ QUESTIONS 1-9: 7
9. THOUGHTS THAT YOU WOULD BE BETTER OFF DEAD, OR OF HURTING YOURSELF: NOT AT ALL
7. TROUBLE CONCENTRATING ON THINGS, SUCH AS READING THE NEWSPAPER OR WATCHING TELEVISION: SEVERAL DAYS
2. FEELING DOWN, DEPRESSED OR HOPELESS: NOT AT ALL

## 2025-08-28 ENCOUNTER — OFFICE VISIT (OUTPATIENT)
Dept: BEHAVIORAL/MENTAL HEALTH CLINIC | Age: 45
End: 2025-08-28
Payer: COMMERCIAL

## 2025-08-28 ENCOUNTER — OFFICE VISIT (OUTPATIENT)
Age: 45
End: 2025-08-28
Payer: COMMERCIAL

## 2025-08-28 VITALS
HEIGHT: 68 IN | SYSTOLIC BLOOD PRESSURE: 122 MMHG | HEART RATE: 98 BPM | WEIGHT: 209 LBS | DIASTOLIC BLOOD PRESSURE: 70 MMHG | BODY MASS INDEX: 31.67 KG/M2

## 2025-08-28 DIAGNOSIS — M50.30 DEGENERATIVE DISC DISEASE, CERVICAL: ICD-10-CM

## 2025-08-28 DIAGNOSIS — M54.2 CHRONIC NECK PAIN: Primary | ICD-10-CM

## 2025-08-28 DIAGNOSIS — F41.1 GENERALIZED ANXIETY DISORDER: Primary | ICD-10-CM

## 2025-08-28 DIAGNOSIS — G89.29 CHRONIC NECK PAIN: Primary | ICD-10-CM

## 2025-08-28 DIAGNOSIS — M47.812 CERVICAL SPONDYLOSIS: ICD-10-CM

## 2025-08-28 PROCEDURE — 90834 PSYTX W PT 45 MINUTES: CPT | Performed by: COUNSELOR

## 2025-08-28 PROCEDURE — 99204 OFFICE O/P NEW MOD 45 MIN: CPT | Performed by: NEUROLOGICAL SURGERY

## 2025-08-28 RX ORDER — SARECYCLINE HYDROCHLORIDE 150 MG/1
TABLET, COATED ORAL
COMMUNITY

## 2025-08-28 RX ORDER — GABAPENTIN 300 MG/1
300 CAPSULE ORAL NIGHTLY
COMMUNITY
Start: 2025-08-20

## 2025-08-28 ASSESSMENT — PATIENT HEALTH QUESTIONNAIRE - PHQ9
7. TROUBLE CONCENTRATING ON THINGS, SUCH AS READING THE NEWSPAPER OR WATCHING TELEVISION: SEVERAL DAYS
8. MOVING OR SPEAKING SO SLOWLY THAT OTHER PEOPLE COULD HAVE NOTICED. OR THE OPPOSITE, BEING SO FIGETY OR RESTLESS THAT YOU HAVE BEEN MOVING AROUND A LOT MORE THAN USUAL: NOT AT ALL
2. FEELING DOWN, DEPRESSED OR HOPELESS: SEVERAL DAYS
1. LITTLE INTEREST OR PLEASURE IN DOING THINGS: NOT AT ALL
10. IF YOU CHECKED OFF ANY PROBLEMS, HOW DIFFICULT HAVE THESE PROBLEMS MADE IT FOR YOU TO DO YOUR WORK, TAKE CARE OF THINGS AT HOME, OR GET ALONG WITH OTHER PEOPLE: SOMEWHAT DIFFICULT
SUM OF ALL RESPONSES TO PHQ QUESTIONS 1-9: 6
5. POOR APPETITE OR OVEREATING: SEVERAL DAYS
SUM OF ALL RESPONSES TO PHQ QUESTIONS 1-9: 6
4. FEELING TIRED OR HAVING LITTLE ENERGY: MORE THAN HALF THE DAYS
6. FEELING BAD ABOUT YOURSELF - OR THAT YOU ARE A FAILURE OR HAVE LET YOURSELF OR YOUR FAMILY DOWN: NOT AT ALL
9. THOUGHTS THAT YOU WOULD BE BETTER OFF DEAD, OR OF HURTING YOURSELF: NOT AT ALL
3. TROUBLE FALLING OR STAYING ASLEEP: SEVERAL DAYS
SUM OF ALL RESPONSES TO PHQ QUESTIONS 1-9: 6
SUM OF ALL RESPONSES TO PHQ QUESTIONS 1-9: 6

## 2025-08-28 ASSESSMENT — ENCOUNTER SYMPTOMS
NAUSEA: 1
CHEST TIGHTNESS: 1

## (undated) DEVICE — GLOVE SURG BEAD CUF 7 STD PF WHT STRL TRIUMPH LT LTX

## (undated) DEVICE — SUTURE PERMAHAND SZ 0 L30IN NONABSORBABLE BLK FSL L30MM 3/8 680H

## (undated) DEVICE — ST CHARLES PERI-GYN PACK: Brand: MEDLINE INDUSTRIES, INC.

## (undated) DEVICE — ELECTRODE ARTHSCP 15X11MM SHFT 11CM MPLR LOOP GRN DISP W/

## (undated) DEVICE — BRAVO CF CAPSULE  DELIVERY DEV, 5-PK: Brand: BRAVO

## (undated) DEVICE — PENCIL ES L3M BTTN SWCH HOLSTER W/ BLDE ELECTRD EDGE

## (undated) DEVICE — SOLUTION IV IRRIG WATER 1000ML POUR BRL 2F7114

## (undated) DEVICE — 1810 FOAM BLOCK NEEDLE COUNTER: Brand: DEVON

## (undated) DEVICE — NEEDLE SPINAL 22GA L3.5IN SPINOCAN

## (undated) DEVICE — GLOVE SURG SZ 75 L12IN FNGR THK79MIL GRN LTX FREE

## (undated) DEVICE — BITEBLOCK ENDOSCP 60FR MAXI WHT POLYETH STURDY W/ VELC WVN

## (undated) DEVICE — ELECTRODE ES L11CM DIA5MM BALL SHFT RED DISP UTAHLOOP

## (undated) DEVICE — SYRINGE MED 10ML SLIP TIP BLNT FILL AND LUERLOCK DISP

## (undated) DEVICE — GOWN POLY REINF SONT XLG: Brand: MEDLINE INDUSTRIES, INC.

## (undated) DEVICE — YANKAUER,BULB TIP,W/O VENT,RIGID,STERILE: Brand: MEDLINE

## (undated) DEVICE — PROCTO SWABS: Brand: DEROYAL

## (undated) DEVICE — GOWN,AURORA,NONREINFORCED,LARGE: Brand: MEDLINE